# Patient Record
Sex: FEMALE | Race: WHITE | NOT HISPANIC OR LATINO | Employment: FULL TIME | URBAN - NONMETROPOLITAN AREA
[De-identification: names, ages, dates, MRNs, and addresses within clinical notes are randomized per-mention and may not be internally consistent; named-entity substitution may affect disease eponyms.]

---

## 2022-03-15 ENCOUNTER — HOSPITAL ENCOUNTER (EMERGENCY)
Facility: HOSPITAL | Age: 42
Discharge: HOME | End: 2022-03-15
Admitting: EMERGENCY MEDICINE

## 2022-03-15 ENCOUNTER — APPOINTMENT (OUTPATIENT)
Dept: CT IMAGING | Facility: HOSPITAL | Age: 42
End: 2022-03-15

## 2022-03-15 VITALS
HEART RATE: 62 BPM | HEIGHT: 70 IN | SYSTOLIC BLOOD PRESSURE: 142 MMHG | WEIGHT: 184 LBS | RESPIRATION RATE: 16 BRPM | BODY MASS INDEX: 26.34 KG/M2 | OXYGEN SATURATION: 98 % | TEMPERATURE: 98.4 F | DIASTOLIC BLOOD PRESSURE: 71 MMHG

## 2022-03-15 DIAGNOSIS — N39.0 URINARY TRACT INFECTION WITHOUT HEMATURIA, SITE UNSPECIFIED: ICD-10-CM

## 2022-03-15 DIAGNOSIS — N20.0 RENAL CALCULUS, RIGHT: Primary | ICD-10-CM

## 2022-03-15 DIAGNOSIS — K42.9 UMBILICAL HERNIA WITHOUT OBSTRUCTION AND WITHOUT GANGRENE: ICD-10-CM

## 2022-03-15 LAB
ALBUMIN SERPL-MCNC: 4.5 G/DL (ref 3.5–5.2)
ALBUMIN/GLOB SERPL: 1.9 G/DL
ALP SERPL-CCNC: 50 U/L (ref 39–117)
ALT SERPL W P-5'-P-CCNC: 8 U/L (ref 1–33)
ANION GAP SERPL CALCULATED.3IONS-SCNC: 8 MMOL/L (ref 5–15)
AST SERPL-CCNC: 11 U/L (ref 1–32)
BACTERIA UR QL AUTO: ABNORMAL /HPF
BASOPHILS # BLD AUTO: 0.04 10*3/MM3 (ref 0–0.2)
BASOPHILS NFR BLD AUTO: 0.4 % (ref 0–1.5)
BILIRUB SERPL-MCNC: 0.2 MG/DL (ref 0–1.2)
BILIRUB UR QL STRIP: NEGATIVE
BUN SERPL-MCNC: 16 MG/DL (ref 6–20)
BUN/CREAT SERPL: 25.4 (ref 7–25)
CALCIUM SPEC-SCNC: 9.8 MG/DL (ref 8.6–10.5)
CHLORIDE SERPL-SCNC: 105 MMOL/L (ref 98–107)
CLARITY UR: ABNORMAL
CO2 SERPL-SCNC: 27 MMOL/L (ref 22–29)
COLOR UR: YELLOW
CREAT SERPL-MCNC: 0.63 MG/DL (ref 0.57–1)
D-LACTATE SERPL-SCNC: 0.7 MMOL/L (ref 0.5–2)
DEPRECATED RDW RBC AUTO: 45 FL (ref 37–54)
EGFRCR SERPLBLD CKD-EPI 2021: 115.9 ML/MIN/1.73
EOSINOPHIL # BLD AUTO: 0.12 10*3/MM3 (ref 0–0.4)
EOSINOPHIL NFR BLD AUTO: 1.2 % (ref 0.3–6.2)
ERYTHROCYTE [DISTWIDTH] IN BLOOD BY AUTOMATED COUNT: 13.3 % (ref 12.3–15.4)
GLOBULIN UR ELPH-MCNC: 2.4 GM/DL
GLUCOSE SERPL-MCNC: 97 MG/DL (ref 65–99)
GLUCOSE UR STRIP-MCNC: NEGATIVE MG/DL
HCT VFR BLD AUTO: 39.8 % (ref 34–46.6)
HGB BLD-MCNC: 13.1 G/DL (ref 12–15.9)
HGB UR QL STRIP.AUTO: ABNORMAL
HYALINE CASTS UR QL AUTO: ABNORMAL /LPF
IMM GRANULOCYTES # BLD AUTO: 0.04 10*3/MM3 (ref 0–0.05)
IMM GRANULOCYTES NFR BLD AUTO: 0.4 % (ref 0–0.5)
KETONES UR QL STRIP: NEGATIVE
LEUKOCYTE ESTERASE UR QL STRIP.AUTO: ABNORMAL
LIPASE SERPL-CCNC: 37 U/L (ref 13–60)
LYMPHOCYTES # BLD AUTO: 2.45 10*3/MM3 (ref 0.7–3.1)
LYMPHOCYTES NFR BLD AUTO: 24 % (ref 19.6–45.3)
MCH RBC QN AUTO: 30.2 PG (ref 26.6–33)
MCHC RBC AUTO-ENTMCNC: 32.9 G/DL (ref 31.5–35.7)
MCV RBC AUTO: 91.7 FL (ref 79–97)
MONOCYTES # BLD AUTO: 0.55 10*3/MM3 (ref 0.1–0.9)
MONOCYTES NFR BLD AUTO: 5.4 % (ref 5–12)
NEUTROPHILS NFR BLD AUTO: 68.6 % (ref 42.7–76)
NEUTROPHILS NFR BLD AUTO: 7 10*3/MM3 (ref 1.7–7)
NITRITE UR QL STRIP: POSITIVE
NRBC BLD AUTO-RTO: 0 /100 WBC (ref 0–0.2)
PH UR STRIP.AUTO: 6 [PH] (ref 5–8)
PLATELET # BLD AUTO: 251 10*3/MM3 (ref 140–450)
PMV BLD AUTO: 9.8 FL (ref 6–12)
POTASSIUM SERPL-SCNC: 3.8 MMOL/L (ref 3.5–5.2)
PROCALCITONIN SERPL-MCNC: 0.03 NG/ML (ref 0–0.25)
PROT SERPL-MCNC: 6.9 G/DL (ref 6–8.5)
PROT UR QL STRIP: ABNORMAL
RBC # BLD AUTO: 4.34 10*6/MM3 (ref 3.77–5.28)
RBC # UR STRIP: ABNORMAL /HPF
REF LAB TEST METHOD: ABNORMAL
SODIUM SERPL-SCNC: 140 MMOL/L (ref 136–145)
SP GR UR STRIP: 1.01 (ref 1–1.03)
SQUAMOUS #/AREA URNS HPF: ABNORMAL /HPF
UROBILINOGEN UR QL STRIP: ABNORMAL
WBC # UR STRIP: ABNORMAL /HPF
WBC NRBC COR # BLD: 10.2 10*3/MM3 (ref 3.4–10.8)
YEAST URNS QL MICRO: ABNORMAL /HPF

## 2022-03-15 PROCEDURE — 74177 CT ABD & PELVIS W/CONTRAST: CPT

## 2022-03-15 PROCEDURE — 25010000002 CEFTRIAXONE PER 250 MG: Performed by: PHYSICIAN ASSISTANT

## 2022-03-15 PROCEDURE — 25010000002 IOPAMIDOL 61 % SOLUTION: Performed by: PHYSICIAN ASSISTANT

## 2022-03-15 PROCEDURE — 25010000002 ONDANSETRON PER 1 MG: Performed by: PHYSICIAN ASSISTANT

## 2022-03-15 PROCEDURE — 25010000002 MORPHINE PER 10 MG: Performed by: FAMILY MEDICINE

## 2022-03-15 PROCEDURE — 83605 ASSAY OF LACTIC ACID: CPT | Performed by: PHYSICIAN ASSISTANT

## 2022-03-15 PROCEDURE — 87077 CULTURE AEROBIC IDENTIFY: CPT | Performed by: PHYSICIAN ASSISTANT

## 2022-03-15 PROCEDURE — 87186 SC STD MICRODIL/AGAR DIL: CPT | Performed by: PHYSICIAN ASSISTANT

## 2022-03-15 PROCEDURE — 80053 COMPREHEN METABOLIC PANEL: CPT | Performed by: PHYSICIAN ASSISTANT

## 2022-03-15 PROCEDURE — 81001 URINALYSIS AUTO W/SCOPE: CPT | Performed by: PHYSICIAN ASSISTANT

## 2022-03-15 PROCEDURE — 84145 PROCALCITONIN (PCT): CPT | Performed by: PHYSICIAN ASSISTANT

## 2022-03-15 PROCEDURE — 99284 EMERGENCY DEPT VISIT MOD MDM: CPT

## 2022-03-15 PROCEDURE — 85025 COMPLETE CBC W/AUTO DIFF WBC: CPT | Performed by: PHYSICIAN ASSISTANT

## 2022-03-15 PROCEDURE — 83690 ASSAY OF LIPASE: CPT | Performed by: PHYSICIAN ASSISTANT

## 2022-03-15 PROCEDURE — 99283 EMERGENCY DEPT VISIT LOW MDM: CPT

## 2022-03-15 PROCEDURE — 96374 THER/PROPH/DIAG INJ IV PUSH: CPT

## 2022-03-15 PROCEDURE — 96375 TX/PRO/DX INJ NEW DRUG ADDON: CPT

## 2022-03-15 PROCEDURE — 87086 URINE CULTURE/COLONY COUNT: CPT | Performed by: PHYSICIAN ASSISTANT

## 2022-03-15 RX ORDER — HYDROCODONE BITARTRATE AND ACETAMINOPHEN 7.5; 325 MG/1; MG/1
1 TABLET ORAL ONCE
Status: COMPLETED | OUTPATIENT
Start: 2022-03-15 | End: 2022-03-15

## 2022-03-15 RX ORDER — KETOROLAC TROMETHAMINE 10 MG/1
10 TABLET, FILM COATED ORAL EVERY 6 HOURS PRN
Qty: 12 TABLET | Refills: 0 | Status: SHIPPED | OUTPATIENT
Start: 2022-03-15 | End: 2022-06-24

## 2022-03-15 RX ORDER — ONDANSETRON 4 MG/1
4 TABLET, ORALLY DISINTEGRATING ORAL EVERY 6 HOURS PRN
Qty: 12 TABLET | Refills: 0 | Status: SHIPPED | OUTPATIENT
Start: 2022-03-15 | End: 2022-03-17 | Stop reason: SDUPTHER

## 2022-03-15 RX ORDER — CEFDINIR 300 MG/1
300 CAPSULE ORAL 2 TIMES DAILY
Qty: 14 CAPSULE | Refills: 0 | Status: SHIPPED | OUTPATIENT
Start: 2022-03-15 | End: 2022-03-22

## 2022-03-15 RX ORDER — OXYCODONE AND ACETAMINOPHEN 7.5; 325 MG/1; MG/1
1 TABLET ORAL EVERY 6 HOURS PRN
Qty: 6 TABLET | Refills: 0 | Status: SHIPPED | OUTPATIENT
Start: 2022-03-15 | End: 2022-03-17 | Stop reason: SDUPTHER

## 2022-03-15 RX ORDER — ONDANSETRON 2 MG/ML
4 INJECTION INTRAMUSCULAR; INTRAVENOUS ONCE
Status: COMPLETED | OUTPATIENT
Start: 2022-03-15 | End: 2022-03-15

## 2022-03-15 RX ORDER — SODIUM CHLORIDE 0.9 % (FLUSH) 0.9 %
10 SYRINGE (ML) INJECTION AS NEEDED
Status: DISCONTINUED | OUTPATIENT
Start: 2022-03-15 | End: 2022-03-15 | Stop reason: HOSPADM

## 2022-03-15 RX ADMIN — SODIUM CHLORIDE, POTASSIUM CHLORIDE, SODIUM LACTATE AND CALCIUM CHLORIDE 1000 ML: 600; 310; 30; 20 INJECTION, SOLUTION INTRAVENOUS at 17:57

## 2022-03-15 RX ADMIN — WATER 1 G: 1000 INJECTION, SOLUTION INTRAVENOUS at 20:13

## 2022-03-15 RX ADMIN — IOPAMIDOL 100 ML: 612 INJECTION, SOLUTION INTRAVENOUS at 19:21

## 2022-03-15 RX ADMIN — ONDANSETRON HYDROCHLORIDE 4 MG: 2 SOLUTION INTRAMUSCULAR; INTRAVENOUS at 17:57

## 2022-03-15 RX ADMIN — MORPHINE SULFATE 4 MG: 4 INJECTION, SOLUTION INTRAMUSCULAR; INTRAVENOUS at 17:57

## 2022-03-15 RX ADMIN — HYDROCODONE BITARTRATE AND ACETAMINOPHEN 1 TABLET: 7.5; 325 TABLET ORAL at 21:06

## 2022-03-15 NOTE — ED PROVIDER NOTES
"Subjective   History of Present Illness    Patient is a 41-year-old female presenting to ED with abdominal pain.  PMH significant for Graves' disease, status post tubal ligation, status post appendectomy.  Patient states 3 days ago she began developing what she thought was intense menstrual cramps in her right lower quadrant as well as pain in her right flank.  Patient states she developed some mild nausea with these episodes and had a longer than normal menstrual period.  Patient reports that when her symptoms persisted she presented to her primary care provider yesterday where she was advised she had a urinary tract infection.  Patient states she was contacted yesterday in the evening with concerns for \"kidney problems or kidney stone\" at which time they advise she come to the ER.  Patient reports she tried to deal with her pain overnight and presents at this time for further evaluation.  Patient did note that she had a negative pelvic ultrasound done yesterday reporting \"there are no female issues.\"  Patient denies fevers, chills, diaphoresis, any further vaginal bleeding, hematuria, vaginal discharge, any concern for known exposure to STDs.  Patient denies any other known sick contact.  Patient reports that a prescription was called in for Macrobid however she is only had 1 dose.    Records reviewed show patient checked into urgent care and was advised to come to the ER without being seen.    Review of Systems   Constitutional: Negative.  Negative for chills, diaphoresis and fever.   HENT: Negative.    Eyes: Negative.    Respiratory: Negative.    Cardiovascular: Negative.    Gastrointestinal: Positive for abdominal pain (RLQ) and nausea. Negative for constipation, diarrhea and vomiting.   Genitourinary: Positive for flank pain (right). Negative for dysuria and hematuria.   Skin: Negative.    Neurological: Negative.    Psychiatric/Behavioral: Negative.    All other systems reviewed and are negative.      Past " Medical History:   Diagnosis Date   • Graves disease    • SVT (supraventricular tachycardia) (HCC)        No Known Allergies    Past Surgical History:   Procedure Laterality Date   • APPENDECTOMY     • TUBAL ABDOMINAL LIGATION         History reviewed. No pertinent family history.    Social History     Socioeconomic History   • Marital status:    Tobacco Use   • Smoking status: Current Every Day Smoker     Packs/day: 1.00     Types: Cigarettes   • Smokeless tobacco: Never Used   Substance and Sexual Activity   • Alcohol use: Never   • Drug use: Never           Objective   Physical Exam  Vitals and nursing note reviewed.   Constitutional:       General: She is in acute distress (appears uncomfortable due to pain).      Appearance: Normal appearance. She is well-developed, well-groomed and overweight. She is not toxic-appearing or diaphoretic.   HENT:      Head: Normocephalic.      Mouth/Throat:      Mouth: Mucous membranes are moist.      Pharynx: Oropharynx is clear.   Eyes:      Conjunctiva/sclera: Conjunctivae normal.      Pupils: Pupils are equal, round, and reactive to light.   Cardiovascular:      Rate and Rhythm: Normal rate and regular rhythm.   Pulmonary:      Effort: Pulmonary effort is normal.      Breath sounds: Normal breath sounds.   Abdominal:      General: Bowel sounds are normal.      Palpations: Abdomen is soft.      Tenderness: There is abdominal tenderness in the right lower quadrant. There is no right CVA tenderness or left CVA tenderness.   Musculoskeletal:         General: Normal range of motion.      Cervical back: Normal range of motion and neck supple.   Skin:     General: Skin is warm and dry.      Coloration: Skin is not jaundiced or pale.   Neurological:      Mental Status: She is alert and oriented to person, place, and time.      Gait: Gait normal.   Psychiatric:         Attention and Perception: Attention normal.         Mood and Affect: Mood and affect normal.         Speech:  Speech normal.         Behavior: Behavior normal. Behavior is cooperative.         Procedures           ED Course  ED Course as of 03/16/22 0910   Tue Mar 15, 2022   2023 [facility] report #[id number].  No previous prescriptions filled in the past year.   No suspicious activity.  [JS]      ED Course User Index  [JS] Jhony Plaza PA-C                                                 MDM  Number of Diagnoses or Management Options     Amount and/or Complexity of Data Reviewed  Clinical lab tests: reviewed and ordered  Tests in the radiology section of CPT®: reviewed and ordered  Tests in the medicine section of CPT®: ordered and reviewed  Decide to obtain previous medical records or to obtain history from someone other than the patient: yes  Review and summarize past medical records: yes  Discuss the patient with other providers: yes (Dr. Joaquim Blair (attending))      Patient is a 41-year-old female presenting to ED with abdominal pain.  PMH significant for Graves' disease, status post tubal ligation, status post appendectomy.  Lab work unremarkable including normal blood cell count 10.2 and no further findings on CBC.  CMP unremarkable including no electrolyte disturbances or renal dysfunctions.  Lipase WNL.  Lactic acid and procalcitonin WNL.  Urinalysis with evidence of infection with large leukocytes, nitrite positive, TNTC WBC, 2+ bacteria, 0-2 squamous epithelium, 0-2 RBC. CT the abdomen and pelvis showed: 13 mm calculus within right renal pelvis with associated urethral thickening within the right renal pelvis and proximal right ureter, no significant distention upper tracts of right kidney, punctate nonobstructing calculus noted involving the lower pole calyx of left kidney, no additional ureteral stones.  Normal bowel gas pattern.  Fat-containing periumbilical hernia.  Uterus and adnexa unremarkable. Case discussed with Dr. Chavez, urology, request patient go to the clinic at 8 am this Thursday 3/17/22.   Request that patient be started on cefdinir at home empirically until her culture results.  Requests adequate pain control for at home with no further recommendations at this time.  Case was discussed with Dr. Joaquim Blair who is in agreement and will provide a short course of pain medications.  Discussed with patient need for continued outpatient follow-up with urology at 8 AM on Thursday.  Discussed importance of staying well-hydrated, compliance with antibiotics, appropriate use of Toradol versus pain medications and Zofran for nausea control.  Discussed return precautions need for me to return to ED should she develop any new or worsening symptoms.  Patient very patient with no further questions concerns, needs at this time and is stable for discharge.      Final diagnoses:   Umbilical hernia without obstruction and without gangrene   Urinary tract infection without hematuria, site unspecified   Renal calculus, right       ED Disposition  ED Disposition     ED Disposition   Discharge    Condition   Stable    Comment   --             Michele Chavez MD  [address] [id number]  [phone]      Present to the office at 8 AM this upcoming Thursday, 3/17/2022     Emergency Department  [address] [id number] [phone]    As needed    Curtis Rowland MD  [address]  [address]  [facility] [address] [phone]    Schedule an appointment as soon as possible for a visit in 2 days           Medication List      New Prescriptions    cefdinir 300 MG capsule  Commonly known as: OMNICEF  Take 1 capsule by mouth 2 (Two) Times a Day for 7 days.     ketorolac 10 MG tablet  Commonly known as: TORADOL  Take 1 tablet by mouth Every 6 (Six) Hours As Needed for Mild Pain .     ondansetron ODT 4 MG disintegrating tablet  Commonly known as: ZOFRAN-ODT  Place 1 tablet on the tongue Every 6 (Six) Hours As Needed for Nausea.      oxyCODONE-acetaminophen 7.5-325 MG per tablet  Commonly known as: PERCOCET  Take 1 tablet by mouth Every 6 (Six) Hours As Needed for Moderate Pain .           Where to Get Your Medications      These medications were sent to [facility] - [place], [place] - [address] - [phone]  - [phone] FX  [address] [address]    Phone: [phone]   · cefdinir 300 MG capsule  · ketorolac 10 MG tablet  · ondansetron ODT 4 MG disintegrating tablet  · oxyCODONE-acetaminophen 7.5-325 MG per tablet          Jhony Plaza PA-C  03/16/22 0900

## 2022-03-16 NOTE — DISCHARGE INSTRUCTIONS
Please follow-up at the urology office at 8 AM this upcoming Thursday 3/17.  Please make sure that you are staying well-hydrated by increasing water.  Please use the Zofran for nausea control.  Exam please use the Toradol as a first-line for pain control and should you continue to have pain use the pain medication.  Please complete all of your antibiotics in their entirety by using the cefdinir.

## 2022-03-17 ENCOUNTER — OFFICE VISIT (OUTPATIENT)
Dept: UROLOGY | Facility: CLINIC | Age: 42
End: 2022-03-17

## 2022-03-17 VITALS
WEIGHT: 184 LBS | HEIGHT: 70 IN | HEART RATE: 60 BPM | TEMPERATURE: 97.7 F | DIASTOLIC BLOOD PRESSURE: 80 MMHG | SYSTOLIC BLOOD PRESSURE: 115 MMHG | BODY MASS INDEX: 26.34 KG/M2

## 2022-03-17 DIAGNOSIS — N20.0 RENAL CALCULUS, RIGHT: ICD-10-CM

## 2022-03-17 DIAGNOSIS — R10.9 RIGHT FLANK PAIN: ICD-10-CM

## 2022-03-17 DIAGNOSIS — N20.1 RIGHT URETERAL STONE: Primary | ICD-10-CM

## 2022-03-17 LAB
BACTERIA SPEC AEROBE CULT: ABNORMAL
BILIRUB BLD-MCNC: ABNORMAL MG/DL
CLARITY, POC: ABNORMAL
COLOR UR: ABNORMAL
GLUCOSE UR STRIP-MCNC: NEGATIVE MG/DL
KETONES UR QL: NEGATIVE
LEUKOCYTE EST, POC: ABNORMAL
NITRITE UR-MCNC: NEGATIVE MG/ML
PH UR: 5 [PH] (ref 5–8)
PROT UR STRIP-MCNC: ABNORMAL MG/DL
RBC # UR STRIP: ABNORMAL /UL
SP GR UR: 1.03 (ref 1–1.03)
UROBILINOGEN UR QL: NORMAL

## 2022-03-17 PROCEDURE — 99203 OFFICE O/P NEW LOW 30 MIN: CPT | Performed by: PHYSICIAN ASSISTANT

## 2022-03-17 RX ORDER — ONDANSETRON 4 MG/1
4 TABLET, ORALLY DISINTEGRATING ORAL EVERY 6 HOURS PRN
Qty: 12 TABLET | Refills: 0 | Status: SHIPPED | OUTPATIENT
Start: 2022-03-17 | End: 2022-06-24

## 2022-03-17 RX ORDER — OXYCODONE AND ACETAMINOPHEN 7.5; 325 MG/1; MG/1
1 TABLET ORAL EVERY 6 HOURS PRN
Qty: 18 TABLET | Refills: 0 | Status: SHIPPED | OUTPATIENT
Start: 2022-03-17 | End: 2022-03-28

## 2022-03-17 NOTE — H&P (VIEW-ONLY)
Subjective    Ms. Arce is 41 y.o. female    Chief Complaint: Here for Kidney Stones. Imaging completed [facility]     History of Present Illness  Urolithiasis  Patient complains of right flank pain with radiation to the abdomen. Onset of symptoms was abrupt starting 2 days ago with unchanged course since that time. Patient describes the pain as colicky and cramping, continuous and rated as moderate. The patient has had nausea and no nausea. There has been no fever or chills. The patient is not complaining of dysuria, frequency, or urgency.  Previous management of stones includes none.  The CT scan done emergency department yesterday revealed a proximal 11 to 12 mm stone in the right renal pelvis with some associated obstructive changes.  I tiny stone in the left kidney no other obvious stones are seen.  Labs were normal in the ER except for a positive urine culture she is taking as cefdinir.    The following portions of the patient's history were reviewed and updated as appropriate: allergies, current medications, past family history, past medical history, past social history, past surgical history and problem list.    Review of Systems   Constitutional: Negative for chills and fever.   Gastrointestinal: Negative for abdominal pain, anal bleeding and blood in stool.   Genitourinary: Positive for flank pain. Negative for decreased urine volume, difficulty urinating, dyspareunia, dysuria, enuresis, frequency, genital sores, hematuria, menstrual problem, pelvic pain, urgency, vaginal bleeding, vaginal discharge and vaginal pain.         Current Outpatient Medications:   •  cefdinir (OMNICEF) 300 MG capsule, Take 1 capsule by mouth 2 (Two) Times a Day for 7 days., Disp: 14 capsule, Rfl: 0  •  ketorolac (TORADOL) 10 MG tablet, Take 1 tablet by mouth Every 6 (Six) Hours As Needed for Mild Pain ., Disp: 12 tablet, Rfl: 0  •  ondansetron ODT (ZOFRAN-ODT) 4 MG disintegrating tablet, Place 1 tablet on the tongue Every 6  "(Six) Hours As Needed for Nausea., Disp: 12 tablet, Rfl: 0  •  oxyCODONE-acetaminophen (PERCOCET) 7.5-325 MG per tablet, Take 1 tablet by mouth Every 6 (Six) Hours As Needed for Moderate Pain ., Disp: 6 tablet, Rfl: 0    Past Medical History:   Diagnosis Date   • Graves disease    • SVT (supraventricular tachycardia) (HCC)        Past Surgical History:   Procedure Laterality Date   • APPENDECTOMY     • TUBAL ABDOMINAL LIGATION         Social History     Socioeconomic History   • Marital status:    Tobacco Use   • Smoking status: Current Every Day Smoker     Packs/day: 1.00     Types: Cigarettes   • Smokeless tobacco: Never Used   Substance and Sexual Activity   • Alcohol use: Never   • Drug use: Never       No family history on file.    Objective    /80   Pulse 60   Temp 97.7 °F (36.5 °C)   Ht 177.8 cm (70\")   Wt 83.5 kg (184 lb)   LMP  (LMP Unknown)   BMI 26.40 kg/m²     Physical Exam  Vitals reviewed.   Constitutional:       Appearance: Normal appearance.   HENT:      Head: Normocephalic and atraumatic.      Nose: No congestion.   Pulmonary:      Effort: Pulmonary effort is normal.   Abdominal:      General: Abdomen is flat.      Palpations: Abdomen is soft.      Tenderness: There is abdominal tenderness. There is no right CVA tenderness or left CVA tenderness.   Skin:     General: Skin is warm and dry.   Neurological:      General: No focal deficit present.      Mental Status: She is alert and oriented to person, place, and time.   Psychiatric:         Mood and Affect: Mood normal.         Behavior: Behavior normal.         Independent review of a CT scan of abdomen and pelvis without contrast  The CT scan of the abdomen/pelvis done without contrast is available for me to review.  Treatment recommendations require an independent review.  First I scanned the liver, spleen, and bowel pattern.  The retroperitoneum including the major vessels and lymphatic packages are briefly reviewed.  This film " as been reviewed by the radiologist to determine any non urologic abnormalities that are present.  The kidneys are closely inspected for size, symmetry, contour, parenchymal thickness, perinephric reaction, presence of calcifications, and intrarenal dilation of the collecting system.  The ureters are inspected for their course, caliber, and any calcifications.  The bladder is inspected for its thickness, size, and presence of any calcifications.  This scan shows approximate 11 to 12 mm oval-shaped stone in the right renal pelvis.  No other obvious ureteral stones are seen there is a punctate stone in the left kidney.          Assessment and Plan    Diagnoses and all orders for this visit:    1. Right ureteral stone (Primary)  -     POC Urinalysis Dipstick, Multipro  -     Case Request; Standing  -     COVID PRE-OP / PRE-PROCEDURE SCREENING ORDER (NO ISOLATION) - Swab, Nasopharynx; Future  -     Protime-INR; Future  -     Case Request    2. Right flank pain  -     Case Request; Standing  -     COVID PRE-OP / PRE-PROCEDURE SCREENING ORDER (NO ISOLATION) - Swab, Nasopharynx; Future  -     Protime-INR; Future  -     Case Request    Other orders  -     Follow Anesthesia Guidelines / Standing Orders; Future  -     Provide NPO Instructions to Patient; Future    Patient went to the emergency department 03/15/2022 with gradually worsening severe pain in her right back right lower quadrant.  He was also having some nausea.  Has not had any stone episodes previously was not aware of the stone that is passing.  Labs were stable in the emergency department her vitals are stable today she still having fairly significant pain.  CT scan done showed a approximate 11 to 12 mm stone in the right renal pelvis.  No other obvious ureteral stones are seen.  I discussed the case with Dr. Chavez and patient will be scheduled next Wednesday for  ESWL with cystoscopy stent placement due to the large size of the stone and the risk of developing  obstruction from stone fragments.  I did tell her that having blood in the urine both with ESWL and cystoscopy stent is likely or possible also pain from the stent is possible.  After discussion patient wishes to proceed with procedure as above which will be next Wednesday with Dr. Chavez.  Her preliminary urine culture is positive for bacteria she has been started on cefdinir.

## 2022-03-17 NOTE — PROGRESS NOTES
Subjective    Ms. Arce is 41 y.o. female    Chief Complaint: Here for Kidney Stones. Imaging completed [facility]     History of Present Illness  Urolithiasis  Patient complains of right flank pain with radiation to the abdomen. Onset of symptoms was abrupt starting 2 days ago with unchanged course since that time. Patient describes the pain as colicky and cramping, continuous and rated as moderate. The patient has had nausea and no nausea. There has been no fever or chills. The patient is not complaining of dysuria, frequency, or urgency.  Previous management of stones includes none.  The CT scan done emergency department yesterday revealed a proximal 11 to 12 mm stone in the right renal pelvis with some associated obstructive changes.  I tiny stone in the left kidney no other obvious stones are seen.  Labs were normal in the ER except for a positive urine culture she is taking as cefdinir.    The following portions of the patient's history were reviewed and updated as appropriate: allergies, current medications, past family history, past medical history, past social history, past surgical history and problem list.    Review of Systems   Constitutional: Negative for chills and fever.   Gastrointestinal: Negative for abdominal pain, anal bleeding and blood in stool.   Genitourinary: Positive for flank pain. Negative for decreased urine volume, difficulty urinating, dyspareunia, dysuria, enuresis, frequency, genital sores, hematuria, menstrual problem, pelvic pain, urgency, vaginal bleeding, vaginal discharge and vaginal pain.         Current Outpatient Medications:   •  cefdinir (OMNICEF) 300 MG capsule, Take 1 capsule by mouth 2 (Two) Times a Day for 7 days., Disp: 14 capsule, Rfl: 0  •  ketorolac (TORADOL) 10 MG tablet, Take 1 tablet by mouth Every 6 (Six) Hours As Needed for Mild Pain ., Disp: 12 tablet, Rfl: 0  •  ondansetron ODT (ZOFRAN-ODT) 4 MG disintegrating tablet, Place 1 tablet on the tongue Every 6  "(Six) Hours As Needed for Nausea., Disp: 12 tablet, Rfl: 0  •  oxyCODONE-acetaminophen (PERCOCET) 7.5-325 MG per tablet, Take 1 tablet by mouth Every 6 (Six) Hours As Needed for Moderate Pain ., Disp: 6 tablet, Rfl: 0    Past Medical History:   Diagnosis Date   • Graves disease    • SVT (supraventricular tachycardia) (HCC)        Past Surgical History:   Procedure Laterality Date   • APPENDECTOMY     • TUBAL ABDOMINAL LIGATION         Social History     Socioeconomic History   • Marital status:    Tobacco Use   • Smoking status: Current Every Day Smoker     Packs/day: 1.00     Types: Cigarettes   • Smokeless tobacco: Never Used   Substance and Sexual Activity   • Alcohol use: Never   • Drug use: Never       No family history on file.    Objective    /80   Pulse 60   Temp 97.7 °F (36.5 °C)   Ht 177.8 cm (70\")   Wt 83.5 kg (184 lb)   LMP  (LMP Unknown)   BMI 26.40 kg/m²     Physical Exam  Vitals reviewed.   Constitutional:       Appearance: Normal appearance.   HENT:      Head: Normocephalic and atraumatic.      Nose: No congestion.   Pulmonary:      Effort: Pulmonary effort is normal.   Abdominal:      General: Abdomen is flat.      Palpations: Abdomen is soft.      Tenderness: There is abdominal tenderness. There is no right CVA tenderness or left CVA tenderness.   Skin:     General: Skin is warm and dry.   Neurological:      General: No focal deficit present.      Mental Status: She is alert and oriented to person, place, and time.   Psychiatric:         Mood and Affect: Mood normal.         Behavior: Behavior normal.         Independent review of a CT scan of abdomen and pelvis without contrast  The CT scan of the abdomen/pelvis done without contrast is available for me to review.  Treatment recommendations require an independent review.  First I scanned the liver, spleen, and bowel pattern.  The retroperitoneum including the major vessels and lymphatic packages are briefly reviewed.  This film " as been reviewed by the radiologist to determine any non urologic abnormalities that are present.  The kidneys are closely inspected for size, symmetry, contour, parenchymal thickness, perinephric reaction, presence of calcifications, and intrarenal dilation of the collecting system.  The ureters are inspected for their course, caliber, and any calcifications.  The bladder is inspected for its thickness, size, and presence of any calcifications.  This scan shows approximate 11 to 12 mm oval-shaped stone in the right renal pelvis.  No other obvious ureteral stones are seen there is a punctate stone in the left kidney.          Assessment and Plan    Diagnoses and all orders for this visit:    1. Right ureteral stone (Primary)  -     POC Urinalysis Dipstick, Multipro  -     Case Request; Standing  -     COVID PRE-OP / PRE-PROCEDURE SCREENING ORDER (NO ISOLATION) - Swab, Nasopharynx; Future  -     Protime-INR; Future  -     Case Request    2. Right flank pain  -     Case Request; Standing  -     COVID PRE-OP / PRE-PROCEDURE SCREENING ORDER (NO ISOLATION) - Swab, Nasopharynx; Future  -     Protime-INR; Future  -     Case Request    Other orders  -     Follow Anesthesia Guidelines / Standing Orders; Future  -     Provide NPO Instructions to Patient; Future    Patient went to the emergency department 03/15/2022 with gradually worsening severe pain in her right back right lower quadrant.  He was also having some nausea.  Has not had any stone episodes previously was not aware of the stone that is passing.  Labs were stable in the emergency department her vitals are stable today she still having fairly significant pain.  CT scan done showed a approximate 11 to 12 mm stone in the right renal pelvis.  No other obvious ureteral stones are seen.  I discussed the case with Dr. Chavez and patient will be scheduled next Wednesday for  ESWL with cystoscopy stent placement due to the large size of the stone and the risk of developing  obstruction from stone fragments.  I did tell her that having blood in the urine both with ESWL and cystoscopy stent is likely or possible also pain from the stent is possible.  After discussion patient wishes to proceed with procedure as above which will be next Wednesday with Dr. Chavez.  Her preliminary urine culture is positive for bacteria she has been started on cefdinir.

## 2022-03-22 ENCOUNTER — PRE-ADMISSION TESTING (OUTPATIENT)
Dept: PREADMISSION TESTING | Facility: HOSPITAL | Age: 42
End: 2022-03-22

## 2022-03-22 ENCOUNTER — LAB (OUTPATIENT)
Dept: LAB | Facility: HOSPITAL | Age: 42
End: 2022-03-22

## 2022-03-22 VITALS
OXYGEN SATURATION: 100 % | SYSTOLIC BLOOD PRESSURE: 130 MMHG | DIASTOLIC BLOOD PRESSURE: 73 MMHG | RESPIRATION RATE: 18 BRPM | HEART RATE: 64 BPM | BODY MASS INDEX: 27.4 KG/M2 | HEIGHT: 69 IN | WEIGHT: 184.97 LBS

## 2022-03-22 DIAGNOSIS — N20.1 RIGHT URETERAL STONE: ICD-10-CM

## 2022-03-22 DIAGNOSIS — R10.9 RIGHT FLANK PAIN: ICD-10-CM

## 2022-03-22 LAB — SARS-COV-2 RNA PNL SPEC NAA+PROBE: NOT DETECTED

## 2022-03-22 PROCEDURE — 87635 SARS-COV-2 COVID-19 AMP PRB: CPT

## 2022-03-22 PROCEDURE — 85610 PROTHROMBIN TIME: CPT | Performed by: PHYSICIAN ASSISTANT

## 2022-03-22 PROCEDURE — 93005 ELECTROCARDIOGRAM TRACING: CPT

## 2022-03-22 PROCEDURE — C9803 HOPD COVID-19 SPEC COLLECT: HCPCS

## 2022-03-22 PROCEDURE — 93010 ELECTROCARDIOGRAM REPORT: CPT | Performed by: INTERNAL MEDICINE

## 2022-03-22 RX ORDER — ATENOLOL 25 MG/1
12.5 TABLET ORAL DAILY
COMMUNITY
End: 2022-06-24

## 2022-03-22 NOTE — DISCHARGE INSTRUCTIONS
Before you come to the hospital      Do not eat, drink, smoke, or chew gum after midnight the night before surgery. This includes no mints.    Arrival time: AS DIRECTED BY OFFICE     Only one family member or friend will be allowed per patient      YOU MAY TAKE THE FOLLOWING MEDICATION(S) THE MORNING OF SURGERY WITH A SIP OF WATER: ***toradol, percocet           ALL OTHER HOME MEDICATION CHECK WITH YOUR PHYSICIAN                            (especially if you are taking diabetes medicines or blood thinners)     Do not take any Erectile Dysfunction medications (EX: CIALIS, VIAGRA) 24 hours prior to surgery      If you were given and instructed to use a germ- killing soap, use as directed the night before surgery and the morning of surgery before coming to the hospital.                 MANAGING PAIN AFTER SURGERY    We know you are probably wondering what your pain will be like after surgery.  Following surgery it is unrealistic to expect you will not have pain.   Pain is how our bodies let us know that something is wrong or cautions us to be careful.  That said, our goal is to make your pain tolerable.    Methods we may use to treat your pain include (oral or IV medications, PCAs, epidurals, nerve blocks, etc.)   While some procedures require IV pain medications for a short time after surgery, transitioning to pain medications by mouth allows for better management of pain.   Your nurse will encourage you to take oral pain medications whenever possible.  IV medications work almost immediately, but only last a short while.  Taking medications by mouth allows for a more constant level of medication in your blood stream for a longer period of time.      Once your pain is out of control it is harder to get back under control.  It is important you are aware when your next dose of pain medication is due.  If you are admitted, your nurse may write the time of your next dose on the white board in your room to help you  remember.      We are interested in your pain and encourage you to inform us about aggravating factors during your visit.   Many times a simple repositioning every few hours can make a big difference.    If your physician says it is okay, do not let your pain prevent you from getting out of bed. Be sure to call your nurse for assistance prior to getting up so you do not fall.      Before surgery, please decide your tolerable pain goal.  These faces help describe the pain ratings we use on a 0-10 scale.   Be prepared to tell us your goal and whether or not you take pain or anxiety medications at home.

## 2022-03-23 ENCOUNTER — HOSPITAL ENCOUNTER (OUTPATIENT)
Facility: HOSPITAL | Age: 42
Setting detail: HOSPITAL OUTPATIENT SURGERY
Discharge: HOME | End: 2022-03-23
Attending: UROLOGY | Admitting: UROLOGY

## 2022-03-23 ENCOUNTER — APPOINTMENT (OUTPATIENT)
Dept: GENERAL RADIOLOGY | Facility: HOSPITAL | Age: 42
End: 2022-03-23

## 2022-03-23 ENCOUNTER — ANESTHESIA EVENT (OUTPATIENT)
Dept: PERIOP | Facility: HOSPITAL | Age: 42
End: 2022-03-23

## 2022-03-23 ENCOUNTER — ANESTHESIA (OUTPATIENT)
Dept: PERIOP | Facility: HOSPITAL | Age: 42
End: 2022-03-23

## 2022-03-23 VITALS
TEMPERATURE: 97 F | RESPIRATION RATE: 16 BRPM | DIASTOLIC BLOOD PRESSURE: 60 MMHG | OXYGEN SATURATION: 97 % | HEART RATE: 58 BPM | SYSTOLIC BLOOD PRESSURE: 128 MMHG

## 2022-03-23 DIAGNOSIS — R10.9 RIGHT FLANK PAIN: Primary | ICD-10-CM

## 2022-03-23 DIAGNOSIS — N20.0 RIGHT KIDNEY STONE: ICD-10-CM

## 2022-03-23 LAB
B-HCG UR QL: NEGATIVE
QT INTERVAL: 428 MS
QTC INTERVAL: 409 MS

## 2022-03-23 PROCEDURE — 74018 RADEX ABDOMEN 1 VIEW: CPT

## 2022-03-23 PROCEDURE — 25010000002 ONDANSETRON PER 1 MG: Performed by: NURSE ANESTHETIST, CERTIFIED REGISTERED

## 2022-03-23 PROCEDURE — C2617 STENT, NON-COR, TEM W/O DEL: HCPCS | Performed by: UROLOGY

## 2022-03-23 PROCEDURE — C1769 GUIDE WIRE: HCPCS | Performed by: UROLOGY

## 2022-03-23 PROCEDURE — 25010000002 FENTANYL CITRATE (PF) 100 MCG/2ML SOLUTION: Performed by: NURSE ANESTHETIST, CERTIFIED REGISTERED

## 2022-03-23 PROCEDURE — 81025 URINE PREGNANCY TEST: CPT | Performed by: UROLOGY

## 2022-03-23 PROCEDURE — 25010000002 FENTANYL CITRATE (PF) 50 MCG/ML SOLUTION: Performed by: ANESTHESIOLOGY

## 2022-03-23 PROCEDURE — 52332 CYSTOSCOPY AND TREATMENT: CPT | Performed by: UROLOGY

## 2022-03-23 PROCEDURE — 25010000002 IOPAMIDOL 61 % SOLUTION: Performed by: UROLOGY

## 2022-03-23 PROCEDURE — 50590 FRAGMENTING OF KIDNEY STONE: CPT | Performed by: UROLOGY

## 2022-03-23 PROCEDURE — 25010000002 CEFAZOLIN PER 500 MG: Performed by: NURSE ANESTHETIST, CERTIFIED REGISTERED

## 2022-03-23 PROCEDURE — 25010000002 PROPOFOL 10 MG/ML EMULSION: Performed by: NURSE ANESTHETIST, CERTIFIED REGISTERED

## 2022-03-23 PROCEDURE — 25010000002 DROPERIDOL PER 5 MG: Performed by: ANESTHESIOLOGY

## 2022-03-23 DEVICE — URETERAL STENT
Type: IMPLANTABLE DEVICE | Site: URETER | Status: FUNCTIONAL
Brand: PERCUFLEX™ PLUS

## 2022-03-23 RX ORDER — OXYBUTYNIN CHLORIDE 5 MG/1
5 TABLET ORAL EVERY 8 HOURS PRN
Qty: 30 TABLET | Refills: 1 | Status: SHIPPED | OUTPATIENT
Start: 2022-03-23 | End: 2022-06-24

## 2022-03-23 RX ORDER — DROPERIDOL 2.5 MG/ML
0.62 INJECTION, SOLUTION INTRAMUSCULAR; INTRAVENOUS ONCE AS NEEDED
Status: COMPLETED | OUTPATIENT
Start: 2022-03-23 | End: 2022-03-23

## 2022-03-23 RX ORDER — LABETALOL HYDROCHLORIDE 5 MG/ML
5 INJECTION, SOLUTION INTRAVENOUS
Status: DISCONTINUED | OUTPATIENT
Start: 2022-03-23 | End: 2022-03-23 | Stop reason: HOSPADM

## 2022-03-23 RX ORDER — FENTANYL CITRATE 50 UG/ML
INJECTION, SOLUTION INTRAMUSCULAR; INTRAVENOUS AS NEEDED
Status: DISCONTINUED | OUTPATIENT
Start: 2022-03-23 | End: 2022-03-23 | Stop reason: SURG

## 2022-03-23 RX ORDER — ACETAMINOPHEN 500 MG
1000 TABLET ORAL ONCE
Status: COMPLETED | OUTPATIENT
Start: 2022-03-23 | End: 2022-03-23

## 2022-03-23 RX ORDER — ONDANSETRON 4 MG/1
4 TABLET, ORALLY DISINTEGRATING ORAL EVERY 6 HOURS PRN
Qty: 6 TABLET | Refills: 1 | Status: SHIPPED | OUTPATIENT
Start: 2022-03-23 | End: 2022-06-24

## 2022-03-23 RX ORDER — SODIUM CHLORIDE, SODIUM LACTATE, POTASSIUM CHLORIDE, CALCIUM CHLORIDE 600; 310; 30; 20 MG/100ML; MG/100ML; MG/100ML; MG/100ML
100 INJECTION, SOLUTION INTRAVENOUS CONTINUOUS
Status: DISCONTINUED | OUTPATIENT
Start: 2022-03-23 | End: 2022-03-23 | Stop reason: HOSPADM

## 2022-03-23 RX ORDER — DOCUSATE SODIUM 100 MG/1
100 CAPSULE, LIQUID FILLED ORAL 2 TIMES DAILY
Qty: 60 CAPSULE | Refills: 1 | Status: SHIPPED | OUTPATIENT
Start: 2022-03-23 | End: 2022-06-24

## 2022-03-23 RX ORDER — SODIUM CHLORIDE, SODIUM LACTATE, POTASSIUM CHLORIDE, CALCIUM CHLORIDE 600; 310; 30; 20 MG/100ML; MG/100ML; MG/100ML; MG/100ML
1000 INJECTION, SOLUTION INTRAVENOUS CONTINUOUS
Status: DISCONTINUED | OUTPATIENT
Start: 2022-03-23 | End: 2022-03-23 | Stop reason: HOSPADM

## 2022-03-23 RX ORDER — FENTANYL CITRATE 50 UG/ML
25 INJECTION, SOLUTION INTRAMUSCULAR; INTRAVENOUS
Status: DISCONTINUED | OUTPATIENT
Start: 2022-03-23 | End: 2022-03-23 | Stop reason: HOSPADM

## 2022-03-23 RX ORDER — NALOXONE HCL 0.4 MG/ML
0.4 VIAL (ML) INJECTION AS NEEDED
Status: DISCONTINUED | OUTPATIENT
Start: 2022-03-23 | End: 2022-03-23 | Stop reason: HOSPADM

## 2022-03-23 RX ORDER — LIDOCAINE HYDROCHLORIDE 20 MG/ML
INJECTION, SOLUTION EPIDURAL; INFILTRATION; INTRACAUDAL; PERINEURAL AS NEEDED
Status: DISCONTINUED | OUTPATIENT
Start: 2022-03-23 | End: 2022-03-23 | Stop reason: SURG

## 2022-03-23 RX ORDER — SODIUM CHLORIDE 0.9 % (FLUSH) 0.9 %
3 SYRINGE (ML) INJECTION EVERY 12 HOURS SCHEDULED
Status: DISCONTINUED | OUTPATIENT
Start: 2022-03-23 | End: 2022-03-23 | Stop reason: HOSPADM

## 2022-03-23 RX ORDER — OXYCODONE AND ACETAMINOPHEN 7.5; 325 MG/1; MG/1
2 TABLET ORAL EVERY 4 HOURS PRN
Status: DISCONTINUED | OUTPATIENT
Start: 2022-03-23 | End: 2022-03-23 | Stop reason: HOSPADM

## 2022-03-23 RX ORDER — LIDOCAINE HYDROCHLORIDE 10 MG/ML
0.5 INJECTION, SOLUTION EPIDURAL; INFILTRATION; INTRACAUDAL; PERINEURAL ONCE AS NEEDED
Status: DISCONTINUED | OUTPATIENT
Start: 2022-03-23 | End: 2022-03-23 | Stop reason: HOSPADM

## 2022-03-23 RX ORDER — CEFDINIR 300 MG/1
300 CAPSULE ORAL 2 TIMES DAILY
Qty: 14 CAPSULE | Refills: 0 | Status: SHIPPED | OUTPATIENT
Start: 2022-03-23 | End: 2022-03-28 | Stop reason: SDUPTHER

## 2022-03-23 RX ORDER — NEOSTIGMINE METHYLSULFATE 5 MG/5 ML
SYRINGE (ML) INTRAVENOUS AS NEEDED
Status: DISCONTINUED | OUTPATIENT
Start: 2022-03-23 | End: 2022-03-23 | Stop reason: SURG

## 2022-03-23 RX ORDER — ROCURONIUM BROMIDE 10 MG/ML
INJECTION, SOLUTION INTRAVENOUS AS NEEDED
Status: DISCONTINUED | OUTPATIENT
Start: 2022-03-23 | End: 2022-03-23 | Stop reason: SURG

## 2022-03-23 RX ORDER — SODIUM CHLORIDE 0.9 % (FLUSH) 0.9 %
3-10 SYRINGE (ML) INJECTION AS NEEDED
Status: DISCONTINUED | OUTPATIENT
Start: 2022-03-23 | End: 2022-03-23 | Stop reason: HOSPADM

## 2022-03-23 RX ORDER — MAGNESIUM HYDROXIDE 1200 MG/15ML
LIQUID ORAL AS NEEDED
Status: DISCONTINUED | OUTPATIENT
Start: 2022-03-23 | End: 2022-03-23 | Stop reason: HOSPADM

## 2022-03-23 RX ORDER — HYDROCODONE BITARTRATE AND ACETAMINOPHEN 7.5; 325 MG/1; MG/1
1 TABLET ORAL ONCE AS NEEDED
Status: DISCONTINUED | OUTPATIENT
Start: 2022-03-23 | End: 2022-03-23 | Stop reason: HOSPADM

## 2022-03-23 RX ORDER — SODIUM CHLORIDE 0.9 % (FLUSH) 0.9 %
3 SYRINGE (ML) INJECTION AS NEEDED
Status: DISCONTINUED | OUTPATIENT
Start: 2022-03-23 | End: 2022-03-23 | Stop reason: HOSPADM

## 2022-03-23 RX ORDER — LIDOCAINE HYDROCHLORIDE 40 MG/ML
SOLUTION TOPICAL AS NEEDED
Status: DISCONTINUED | OUTPATIENT
Start: 2022-03-23 | End: 2022-03-23 | Stop reason: SURG

## 2022-03-23 RX ORDER — IBUPROFEN 600 MG/1
600 TABLET ORAL ONCE AS NEEDED
Status: DISCONTINUED | OUTPATIENT
Start: 2022-03-23 | End: 2022-03-23 | Stop reason: HOSPADM

## 2022-03-23 RX ORDER — PHENAZOPYRIDINE HYDROCHLORIDE 100 MG/1
100 TABLET, FILM COATED ORAL 3 TIMES DAILY PRN
Qty: 20 TABLET | Refills: 1 | Status: SHIPPED | OUTPATIENT
Start: 2022-03-23 | End: 2022-06-24

## 2022-03-23 RX ORDER — KETOROLAC TROMETHAMINE 10 MG/1
10 TABLET, FILM COATED ORAL EVERY 6 HOURS PRN
Qty: 12 TABLET | Refills: 0 | Status: SHIPPED | OUTPATIENT
Start: 2022-03-23 | End: 2022-06-24

## 2022-03-23 RX ORDER — CEFAZOLIN SODIUM 1 G/3ML
INJECTION, POWDER, FOR SOLUTION INTRAMUSCULAR; INTRAVENOUS AS NEEDED
Status: DISCONTINUED | OUTPATIENT
Start: 2022-03-23 | End: 2022-03-23 | Stop reason: SURG

## 2022-03-23 RX ORDER — FLUMAZENIL 0.1 MG/ML
0.2 INJECTION INTRAVENOUS AS NEEDED
Status: DISCONTINUED | OUTPATIENT
Start: 2022-03-23 | End: 2022-03-23 | Stop reason: HOSPADM

## 2022-03-23 RX ORDER — PROPOFOL 10 MG/ML
VIAL (ML) INTRAVENOUS AS NEEDED
Status: DISCONTINUED | OUTPATIENT
Start: 2022-03-23 | End: 2022-03-23 | Stop reason: SURG

## 2022-03-23 RX ORDER — ONDANSETRON 2 MG/ML
4 INJECTION INTRAMUSCULAR; INTRAVENOUS ONCE AS NEEDED
Status: DISCONTINUED | OUTPATIENT
Start: 2022-03-23 | End: 2022-03-23 | Stop reason: HOSPADM

## 2022-03-23 RX ORDER — HYDROCODONE BITARTRATE AND ACETAMINOPHEN 7.5; 325 MG/1; MG/1
1 TABLET ORAL EVERY 6 HOURS PRN
Qty: 15 TABLET | Refills: 0 | Status: SHIPPED | OUTPATIENT
Start: 2022-03-23 | End: 2022-04-06 | Stop reason: SDUPTHER

## 2022-03-23 RX ORDER — EPHEDRINE SULFATE 50 MG/ML
INJECTION, SOLUTION INTRAVENOUS AS NEEDED
Status: DISCONTINUED | OUTPATIENT
Start: 2022-03-23 | End: 2022-03-23 | Stop reason: SURG

## 2022-03-23 RX ORDER — MIDAZOLAM HYDROCHLORIDE 1 MG/ML
1 INJECTION INTRAMUSCULAR; INTRAVENOUS
Status: DISCONTINUED | OUTPATIENT
Start: 2022-03-23 | End: 2022-03-23 | Stop reason: HOSPADM

## 2022-03-23 RX ORDER — ONDANSETRON 2 MG/ML
INJECTION INTRAMUSCULAR; INTRAVENOUS AS NEEDED
Status: DISCONTINUED | OUTPATIENT
Start: 2022-03-23 | End: 2022-03-23 | Stop reason: SURG

## 2022-03-23 RX ORDER — OXYCODONE AND ACETAMINOPHEN 10; 325 MG/1; MG/1
1 TABLET ORAL ONCE AS NEEDED
Status: DISCONTINUED | OUTPATIENT
Start: 2022-03-23 | End: 2022-03-23 | Stop reason: HOSPADM

## 2022-03-23 RX ADMIN — CEFAZOLIN 2 G: 330 INJECTION, POWDER, FOR SOLUTION INTRAMUSCULAR; INTRAVENOUS at 17:02

## 2022-03-23 RX ADMIN — FENTANYL CITRATE 100 MCG: 50 INJECTION, SOLUTION INTRAMUSCULAR; INTRAVENOUS at 16:48

## 2022-03-23 RX ADMIN — SODIUM CHLORIDE, POTASSIUM CHLORIDE, SODIUM LACTATE AND CALCIUM CHLORIDE 1000 ML: 600; 310; 30; 20 INJECTION, SOLUTION INTRAVENOUS at 12:51

## 2022-03-23 RX ADMIN — PROPOFOL 200 MG: 10 INJECTION, EMULSION INTRAVENOUS at 16:48

## 2022-03-23 RX ADMIN — ROCURONIUM BROMIDE 30 MG: 10 SOLUTION INTRAVENOUS at 16:48

## 2022-03-23 RX ADMIN — LIDOCAINE HYDROCHLORIDE 40 MG: 20 INJECTION, SOLUTION EPIDURAL; INFILTRATION; INTRACAUDAL; PERINEURAL at 16:48

## 2022-03-23 RX ADMIN — Medication 3 MG: at 17:45

## 2022-03-23 RX ADMIN — EPHEDRINE SULFATE 10 MG: 50 INJECTION INTRAVENOUS at 17:24

## 2022-03-23 RX ADMIN — LIDOCAINE HYDROCHLORIDE 1 EACH: 40 SOLUTION TOPICAL at 16:48

## 2022-03-23 RX ADMIN — ONDANSETRON 4 MG: 2 INJECTION INTRAMUSCULAR; INTRAVENOUS at 17:45

## 2022-03-23 RX ADMIN — DROPERIDOL 0.62 MG: 2.5 INJECTION, SOLUTION INTRAMUSCULAR; INTRAVENOUS at 18:05

## 2022-03-23 RX ADMIN — PROPOFOL 50 MG: 10 INJECTION, EMULSION INTRAVENOUS at 17:45

## 2022-03-23 RX ADMIN — ACETAMINOPHEN 1000 MG: 500 TABLET, FILM COATED ORAL at 15:53

## 2022-03-23 RX ADMIN — FENTANYL CITRATE 25 MCG: 50 INJECTION INTRAMUSCULAR; INTRAVENOUS at 18:10

## 2022-03-23 RX ADMIN — OXYCODONE HYDROCHLORIDE AND ACETAMINOPHEN 2 TABLET: 7.5; 325 TABLET ORAL at 18:13

## 2022-03-23 RX ADMIN — FENTANYL CITRATE 25 MCG: 50 INJECTION INTRAMUSCULAR; INTRAVENOUS at 18:05

## 2022-03-23 NOTE — OP NOTE
OP NOTE  Denise Ahn    Date of Procedure: 3/23/2022    Pre-op Diagnosis:   Right ureteral stone [N20.1]  Right flank pain [R10.9]    Post-op Diagnosis:     Post-Op Diagnosis Codes:     * Right ureteral stone [N20.1]     * Right flank pain [R10.9]    Procedure/CPT® Codes:      Procedure(s):   RIGHT EXTRACORPOREAL SHOCKWAVE LITHOTRIPSY, CYSTO RIGHT URETERAL STENT PLACEMENT    Surgeon(s):  Michele Chavez MD    Anesthesia: General    Staff:   Circulator: Nirmala Vuong RN  Scrub Person: Vidhi Schaefer  Vendor Representative: Kaz Simms    Indications:   41-year-old female with a 1.5 cm right renal pelvis stone.   After discussion of options, patient has given informed consent to undergo right ESWL.  All risks, benefits, and alternatives were discussed.  She understands the need for concomitant stent placement due to the very large stone size.    Operative Report: The patient is identified. The KUB is reviewed. After answering any questions, patient was brought to the operating room and placed on the patient table of the [facility].  General endotracheal anesthesia was administered.  Preoperative KUB was reviewed.   An [facility] c-arm fluoroscope was used to identify the stone.    The patient was positioned in the dorsal lithotomy position and her genitalia were prepped and draped in usual sterile fashion.  A final timeout was performed.  I placed a 23 Divehi rigid cystoscope into the bladder and identified the right ureteral orifice.  I placed a 0.035 sensor guidewire up to the right kidney under fluoroscopy.  I then placed a 6 Divehi by 26 cm double-J ureteral stent over the wire in the standard fashion.  After curl was obtained in the right kidney, the wire was completely removed and a curl was obtained the bladder.  The string was removed from the stent prior to placement.  Bladder was drained and attention was then turned to the lithotripsy.  She was returned to the supine position and positioned  under the lithotripter to target the stone using biplanar fluoroscopy.    The shock-head is brought into position.  This stone is brought into the F2 plane for focus.  Treatment was initiated.  We gradually increased the energy level from 1 to 7.  This stone was treated at a rate of 60 for the first 300 shocks followed by rate of 90.  We paused for 2 minutes after 300 shocks to reduce risk of renal damage.  The stone was periodically checked to make sure that we were on it and getting good breakup.  We checked at 700, 1000, 1500, 2000, and 2500 shocks.  The stone did have good breakup.  I felt it was unnecessary to place a ureteral stent.  At the conclusion of 3000 shocks, the patient was awakened from anesthesia and transferred to the recovery room in satisfactory condition.      Estimated Blood Loss: <30 mL    Specimens:                None      Drains: Right 6 Italian by 26 cm double-J ureteral stent without string  Ureteral Drain/Stent Right ureter  (Active)   Site Assessment SOCORRO 03/23/22 1815   Dressing Type Open to air 03/23/22 1815       Complications: none  Plan: Repeat KUB in a few weeks. Home today if no evidence of infection or significant bleeding.     Michele Chavez MD     Date: 3/23/2022  Time: 18:27 CDT

## 2022-03-23 NOTE — ANESTHESIA PREPROCEDURE EVALUATION
Anesthesia Evaluation     history of anesthetic complications (svt during general anesthesia years ago):  NPO Solid Status: > 8 hours  NPO Liquid Status: > 8 hours           Airway   Mallampati: I  TM distance: >3 FB  Neck ROM: full  No difficulty expected  Dental      Pulmonary    (+) a smoker Current,   Cardiovascular   Exercise tolerance: good (4-7 METS)    ECG reviewed    (+) dysrhythmias (SVT),       Neuro/Psych  (-) seizures, TIA, CVA  GI/Hepatic/Renal/Endo    (+)   renal disease stones, thyroid problem (graves disease)   (-) diabetes    Musculoskeletal     Abdominal    Substance History      OB/GYN          Other                        Anesthesia Plan    ASA 2     general     intravenous induction     Anesthetic plan, all risks, benefits, and alternatives have been provided, discussed and informed consent has been obtained with: patient.        CODE STATUS:

## 2022-03-23 NOTE — INTERVAL H&P NOTE
H&P reviewed. The patient was examined and there are no changes to the H&P.  I previously discussed options for stone management with the patient in the office including observation, ureteroscopy laser lithotripsy, and extracorporeal shockwave lithotripsy.  She has been treated with culture specific antibiotics for the last week.  KUB x-ray today shows a 1.5 cm right renal pelvis stone.  Due to the large nature of the stone, patient understands need for concomitant stent placement.  She would like to proceed today with right extracorporeal shock with lithotripsy.  We previously discussed risks of the procedure including but not limited to infection, bleeding, need for additional procedures, inability to break up/pass any/all stone, complications of anesthesia, need for ureteral stent.  She voices understanding and provide informed said to proceed today.

## 2022-03-23 NOTE — ANESTHESIA PROCEDURE NOTES
Airway  Urgency: elective    Date/Time: 3/23/2022 4:49 PM  Airway not difficult    General Information and Staff    Patient location during procedure: OR  CRNA: Valdemar Castellon CRNA    Indications and Patient Condition    Preoxygenated: yes  Mask difficulty assessment: 1 - vent by mask    Final Airway Details  Final airway type: endotracheal airway      Successful airway: ETT  Cuffed: yes   Successful intubation technique: video laryngoscopy  Facilitating devices/methods: intubating stylet  Endotracheal tube insertion site: oral  Blade: De Los Santos  Blade size: 3  ETT size (mm): 7.5  Cormack-Lehane Classification: grade I - full view of glottis  Placement verified by: capnometry and palpation of cuff   Measured from: lips  ETT/EBT  to lips (cm): 22  Number of attempts at approach: 1  Assessment: lips, teeth, and gum same as pre-op and atraumatic intubation

## 2022-03-24 ENCOUNTER — TELEPHONE (OUTPATIENT)
Dept: UROLOGY | Facility: CLINIC | Age: 42
End: 2022-03-24

## 2022-03-24 NOTE — TELEPHONE ENCOUNTER
----- Message from Shanti Doe MA sent at 3/24/2022  9:00 AM CDT -----  Regarding: FW: Needs cystoscopy stent pull appointment on 4/6/2022 with shan CASPER    ----- Message -----  From: Michele Chavez MD  Sent: 3/23/2022   9:14 PM CDT  To: Shanti Doe MA, #  Subject: Needs cystoscopy stent pull appointment on 4#    Please call Mrs. Ahn and schedule her with shan CASPER for cystoscopy stent pull appointment on 4/6 thank you

## 2022-03-24 NOTE — ANESTHESIA POSTPROCEDURE EVALUATION
Patient: Denise Ahn    Procedure Summary     Date: 03/23/22 Room / Location:  [facility] OR 09 /  [facility]    Anesthesia Start: 1646 Anesthesia Stop: 1756    Procedure: EXTRACORPOREAL SHOCKWAVE LITHOTRIPSY  RIGHT CYSTO STENT PLACEMENT (Right ) Diagnosis:       Right ureteral stone      Right flank pain      (Right ureteral stone [N20.1])      (Right flank pain [R10.9])    Surgeons: Michele Chavez MD Provider: Valdemar Castellon CRNA    Anesthesia Type: general ASA Status: 2          Anesthesia Type: general    Vitals  Vitals Value Taken Time   /60 03/23/22 1839   Temp 97 °F (36.1 °C) 03/23/22 1830   Pulse 59 03/23/22 1839   Resp 16 03/23/22 1839   SpO2 97 % 03/23/22 1839           Post Anesthesia Care and Evaluation    Patient location during evaluation: PACU  Patient participation: complete - patient participated  Level of consciousness: awake  Pain management: adequate  Airway patency: patent  Anesthetic complications: No anesthetic complications  PONV Status: none  Cardiovascular status: acceptable  Respiratory status: acceptable  Hydration status: acceptable  No anesthesia care post op

## 2022-03-24 NOTE — TELEPHONE ENCOUNTER
Called patient and left a voicemail with appointment information for her cysto stent removal on 4/6/22, at 10:20am with Dr. Chavez.

## 2022-03-28 ENCOUNTER — HOSPITAL ENCOUNTER (EMERGENCY)
Facility: HOSPITAL | Age: 42
Discharge: HOME | End: 2022-03-28
Admitting: FAMILY MEDICINE

## 2022-03-28 ENCOUNTER — APPOINTMENT (OUTPATIENT)
Dept: CT IMAGING | Facility: HOSPITAL | Age: 42
End: 2022-03-28

## 2022-03-28 ENCOUNTER — TELEPHONE (OUTPATIENT)
Dept: UROLOGY | Facility: CLINIC | Age: 42
End: 2022-03-28

## 2022-03-28 VITALS
HEIGHT: 70 IN | BODY MASS INDEX: 26.34 KG/M2 | DIASTOLIC BLOOD PRESSURE: 74 MMHG | SYSTOLIC BLOOD PRESSURE: 135 MMHG | TEMPERATURE: 97.8 F | RESPIRATION RATE: 18 BRPM | HEART RATE: 62 BPM | OXYGEN SATURATION: 98 % | WEIGHT: 184 LBS

## 2022-03-28 DIAGNOSIS — N20.0 RIGHT KIDNEY STONE: ICD-10-CM

## 2022-03-28 DIAGNOSIS — R10.9 RIGHT FLANK PAIN: ICD-10-CM

## 2022-03-28 DIAGNOSIS — N39.0 URINARY TRACT INFECTION WITH HEMATURIA, SITE UNSPECIFIED: Primary | ICD-10-CM

## 2022-03-28 DIAGNOSIS — R31.9 URINARY TRACT INFECTION WITH HEMATURIA, SITE UNSPECIFIED: Primary | ICD-10-CM

## 2022-03-28 DIAGNOSIS — N20.1 RIGHT URETERAL STONE: ICD-10-CM

## 2022-03-28 LAB
ALBUMIN SERPL-MCNC: 4.3 G/DL (ref 3.5–5.2)
ALBUMIN/GLOB SERPL: 1.7 G/DL
ALP SERPL-CCNC: 46 U/L (ref 39–117)
ALT SERPL W P-5'-P-CCNC: 9 U/L (ref 1–33)
ANION GAP SERPL CALCULATED.3IONS-SCNC: 9 MMOL/L (ref 5–15)
AST SERPL-CCNC: 14 U/L (ref 1–32)
B-HCG UR QL: NEGATIVE
BACTERIA UR QL AUTO: ABNORMAL /HPF
BASOPHILS # BLD AUTO: 0.05 10*3/MM3 (ref 0–0.2)
BASOPHILS NFR BLD AUTO: 0.4 % (ref 0–1.5)
BILIRUB SERPL-MCNC: 0.2 MG/DL (ref 0–1.2)
BILIRUB UR QL STRIP: NEGATIVE
BUN SERPL-MCNC: 16 MG/DL (ref 6–20)
BUN/CREAT SERPL: 18.2 (ref 7–25)
CALCIUM SPEC-SCNC: 9.2 MG/DL (ref 8.6–10.5)
CHLORIDE SERPL-SCNC: 104 MMOL/L (ref 98–107)
CLARITY UR: ABNORMAL
CO2 SERPL-SCNC: 25 MMOL/L (ref 22–29)
COLOR UR: ABNORMAL
CREAT SERPL-MCNC: 0.88 MG/DL (ref 0.57–1)
D-LACTATE SERPL-SCNC: 0.7 MMOL/L (ref 0.5–2)
DEPRECATED RDW RBC AUTO: 46.6 FL (ref 37–54)
EGFRCR SERPLBLD CKD-EPI 2021: 85.9 ML/MIN/1.73
EOSINOPHIL # BLD AUTO: 0.35 10*3/MM3 (ref 0–0.4)
EOSINOPHIL NFR BLD AUTO: 2.8 % (ref 0.3–6.2)
ERYTHROCYTE [DISTWIDTH] IN BLOOD BY AUTOMATED COUNT: 13.6 % (ref 12.3–15.4)
EXPIRATION DATE: NORMAL
GLOBULIN UR ELPH-MCNC: 2.6 GM/DL
GLUCOSE SERPL-MCNC: 102 MG/DL (ref 65–99)
GLUCOSE UR STRIP-MCNC: NEGATIVE MG/DL
HCT VFR BLD AUTO: 39.9 % (ref 34–46.6)
HGB BLD-MCNC: 12.8 G/DL (ref 12–15.9)
HGB UR QL STRIP.AUTO: ABNORMAL
HYALINE CASTS UR QL AUTO: ABNORMAL /LPF
IMM GRANULOCYTES # BLD AUTO: 0.19 10*3/MM3 (ref 0–0.05)
IMM GRANULOCYTES NFR BLD AUTO: 1.5 % (ref 0–0.5)
INR PPP: 0.97 (ref 0.91–1.09)
INTERNAL NEGATIVE CONTROL: NORMAL
INTERNAL POSITIVE CONTROL: NORMAL
KETONES UR QL STRIP: NEGATIVE
LEUKOCYTE ESTERASE UR QL STRIP.AUTO: ABNORMAL
LIPASE SERPL-CCNC: 15 U/L (ref 13–60)
LYMPHOCYTES # BLD AUTO: 2.13 10*3/MM3 (ref 0.7–3.1)
LYMPHOCYTES NFR BLD AUTO: 17.3 % (ref 19.6–45.3)
Lab: NORMAL
MCH RBC QN AUTO: 29.6 PG (ref 26.6–33)
MCHC RBC AUTO-ENTMCNC: 32.1 G/DL (ref 31.5–35.7)
MCV RBC AUTO: 92.1 FL (ref 79–97)
MONOCYTES # BLD AUTO: 0.96 10*3/MM3 (ref 0.1–0.9)
MONOCYTES NFR BLD AUTO: 7.8 % (ref 5–12)
NEUTROPHILS NFR BLD AUTO: 70.2 % (ref 42.7–76)
NEUTROPHILS NFR BLD AUTO: 8.61 10*3/MM3 (ref 1.7–7)
NITRITE UR QL STRIP: POSITIVE
NRBC BLD AUTO-RTO: 0 /100 WBC (ref 0–0.2)
PH UR STRIP.AUTO: 5.5 [PH] (ref 5–8)
PLATELET # BLD AUTO: 197 10*3/MM3 (ref 140–450)
PMV BLD AUTO: 10 FL (ref 6–12)
POTASSIUM SERPL-SCNC: 4.2 MMOL/L (ref 3.5–5.2)
PROCALCITONIN SERPL-MCNC: <0.2 NG/ML (ref 0–0.25)
PROT SERPL-MCNC: 6.9 G/DL (ref 6–8.5)
PROT UR QL STRIP: ABNORMAL
PROTHROMBIN TIME: 12.5 SECONDS (ref 11.9–14.6)
RBC # BLD AUTO: 4.33 10*6/MM3 (ref 3.77–5.28)
RBC # UR STRIP: ABNORMAL /HPF
REF LAB TEST METHOD: ABNORMAL
SODIUM SERPL-SCNC: 138 MMOL/L (ref 136–145)
SP GR UR STRIP: 1.02 (ref 1–1.03)
SQUAMOUS #/AREA URNS HPF: ABNORMAL /HPF
UROBILINOGEN UR QL STRIP: ABNORMAL
WBC # UR STRIP: ABNORMAL /HPF
WBC NRBC COR # BLD: 12.29 10*3/MM3 (ref 3.4–10.8)

## 2022-03-28 PROCEDURE — 85025 COMPLETE CBC W/AUTO DIFF WBC: CPT | Performed by: NURSE PRACTITIONER

## 2022-03-28 PROCEDURE — 80053 COMPREHEN METABOLIC PANEL: CPT | Performed by: NURSE PRACTITIONER

## 2022-03-28 PROCEDURE — 25010000002 KETOROLAC TROMETHAMINE PER 15 MG: Performed by: NURSE PRACTITIONER

## 2022-03-28 PROCEDURE — 81025 URINE PREGNANCY TEST: CPT | Performed by: NURSE PRACTITIONER

## 2022-03-28 PROCEDURE — 83690 ASSAY OF LIPASE: CPT | Performed by: NURSE PRACTITIONER

## 2022-03-28 PROCEDURE — 83605 ASSAY OF LACTIC ACID: CPT | Performed by: NURSE PRACTITIONER

## 2022-03-28 PROCEDURE — 0 HYDROMORPHONE 1 MG/ML SOLUTION: Performed by: NURSE PRACTITIONER

## 2022-03-28 PROCEDURE — 96374 THER/PROPH/DIAG INJ IV PUSH: CPT

## 2022-03-28 PROCEDURE — 25010000002 ONDANSETRON PER 1 MG: Performed by: NURSE PRACTITIONER

## 2022-03-28 PROCEDURE — 85610 PROTHROMBIN TIME: CPT | Performed by: NURSE PRACTITIONER

## 2022-03-28 PROCEDURE — 87040 BLOOD CULTURE FOR BACTERIA: CPT | Performed by: NURSE PRACTITIONER

## 2022-03-28 PROCEDURE — 25010000002 CEFTRIAXONE PER 250 MG: Performed by: NURSE PRACTITIONER

## 2022-03-28 PROCEDURE — 87086 URINE CULTURE/COLONY COUNT: CPT | Performed by: NURSE PRACTITIONER

## 2022-03-28 PROCEDURE — 99283 EMERGENCY DEPT VISIT LOW MDM: CPT

## 2022-03-28 PROCEDURE — 96376 TX/PRO/DX INJ SAME DRUG ADON: CPT

## 2022-03-28 PROCEDURE — 81001 URINALYSIS AUTO W/SCOPE: CPT | Performed by: NURSE PRACTITIONER

## 2022-03-28 PROCEDURE — 96375 TX/PRO/DX INJ NEW DRUG ADDON: CPT

## 2022-03-28 PROCEDURE — 84145 PROCALCITONIN (PCT): CPT | Performed by: NURSE PRACTITIONER

## 2022-03-28 PROCEDURE — 74176 CT ABD & PELVIS W/O CONTRAST: CPT

## 2022-03-28 RX ORDER — HYDROCODONE BITARTRATE AND ACETAMINOPHEN 7.5; 325 MG/1; MG/1
1 TABLET ORAL EVERY 6 HOURS PRN
Qty: 12 TABLET | Refills: 0 | Status: SHIPPED | OUTPATIENT
Start: 2022-03-28 | End: 2022-03-31

## 2022-03-28 RX ORDER — KETOROLAC TROMETHAMINE 10 MG/1
10 TABLET, FILM COATED ORAL EVERY 8 HOURS PRN
Qty: 6 TABLET | Refills: 0 | Status: SHIPPED | OUTPATIENT
Start: 2022-03-28 | End: 2022-03-31

## 2022-03-28 RX ORDER — TAMSULOSIN HYDROCHLORIDE 0.4 MG/1
1 CAPSULE ORAL DAILY
Qty: 30 CAPSULE | Refills: 0 | Status: SHIPPED | OUTPATIENT
Start: 2022-03-28 | End: 2022-06-24

## 2022-03-28 RX ORDER — SODIUM CHLORIDE 0.9 % (FLUSH) 0.9 %
10 SYRINGE (ML) INJECTION AS NEEDED
Status: DISCONTINUED | OUTPATIENT
Start: 2022-03-28 | End: 2022-03-28 | Stop reason: HOSPADM

## 2022-03-28 RX ORDER — ONDANSETRON 2 MG/ML
4 INJECTION INTRAMUSCULAR; INTRAVENOUS ONCE
Status: COMPLETED | OUTPATIENT
Start: 2022-03-28 | End: 2022-03-28

## 2022-03-28 RX ORDER — KETOROLAC TROMETHAMINE 30 MG/ML
30 INJECTION, SOLUTION INTRAMUSCULAR; INTRAVENOUS ONCE
Status: COMPLETED | OUTPATIENT
Start: 2022-03-28 | End: 2022-03-28

## 2022-03-28 RX ORDER — CEFDINIR 300 MG/1
300 CAPSULE ORAL 2 TIMES DAILY
Qty: 10 CAPSULE | Refills: 0 | Status: SHIPPED | OUTPATIENT
Start: 2022-03-28 | End: 2022-04-02

## 2022-03-28 RX ADMIN — HYDROMORPHONE HYDROCHLORIDE 1 MG: 1 INJECTION, SOLUTION INTRAMUSCULAR; INTRAVENOUS; SUBCUTANEOUS at 19:52

## 2022-03-28 RX ADMIN — SODIUM CHLORIDE, POTASSIUM CHLORIDE, SODIUM LACTATE AND CALCIUM CHLORIDE 1000 ML: 600; 310; 30; 20 INJECTION, SOLUTION INTRAVENOUS at 17:02

## 2022-03-28 RX ADMIN — KETOROLAC TROMETHAMINE 30 MG: 30 INJECTION, SOLUTION INTRAMUSCULAR; INTRAVENOUS at 15:47

## 2022-03-28 RX ADMIN — WATER 1 G: 100 INJECTION, SOLUTION INTRAVENOUS at 19:51

## 2022-03-28 RX ADMIN — ONDANSETRON HYDROCHLORIDE 4 MG: 2 SOLUTION INTRAMUSCULAR; INTRAVENOUS at 16:34

## 2022-03-28 RX ADMIN — HYDROMORPHONE HYDROCHLORIDE 1 MG: 1 INJECTION, SOLUTION INTRAMUSCULAR; INTRAVENOUS; SUBCUTANEOUS at 16:34

## 2022-03-28 RX ADMIN — ONDANSETRON 4 MG: 2 INJECTION INTRAMUSCULAR; INTRAVENOUS at 19:51

## 2022-03-28 NOTE — TELEPHONE ENCOUNTER
Provider: DR. CHAN  Caller: CHAPIN RICHARDS  Relationship to Patient: SELF    Phone Number: [phone]    Reason for Call: PATIENT HAD SURGERY 3/23/22 AND WENT BACK TO WORK TODAY. SHE STATES SHE IS IN PAIN, FEELS TERRIBLE AND SHAKING. HUB WARM TRANSFERRED TO CLINICAL LINE & WAS INSTRUCTED TO DIRECT PATIENT TO ER IF SHE IS IN THAT MUCH PAIN & THAT THERE IS A UROLOGIST ON CALL THERE.     PATIENT STATES SHE WILL TELL HER BOSS AND GO TO THE  ER. IT MAY BE A LITTLE WHILE SINCE SHE IS IN [place].

## 2022-03-29 LAB — BACTERIA SPEC AEROBE CULT: NO GROWTH

## 2022-03-30 LAB — BACTERIA SPEC AEROBE CULT: NO GROWTH

## 2022-04-02 LAB
BACTERIA SPEC AEROBE CULT: NORMAL
BACTERIA SPEC AEROBE CULT: NORMAL

## 2022-04-04 ENCOUNTER — TELEPHONE (OUTPATIENT)
Dept: UROLOGY | Facility: CLINIC | Age: 42
End: 2022-04-04

## 2022-04-04 DIAGNOSIS — N20.1 RIGHT URETERAL STONE: Primary | ICD-10-CM

## 2022-04-04 NOTE — TELEPHONE ENCOUNTER
----- Message from Michele Chavez MD sent at 4/4/2022  3:38 PM CDT -----  Regarding: I need this patient to get a KUB prior to her appt this W[facility]  I need to look at her KUB before she is set up for a cysto,,,    Thank you

## 2022-04-05 ENCOUNTER — TELEPHONE (OUTPATIENT)
Dept: UROLOGY | Facility: CLINIC | Age: 42
End: 2022-04-05

## 2022-04-06 ENCOUNTER — PROCEDURE VISIT (OUTPATIENT)
Dept: UROLOGY | Facility: CLINIC | Age: 42
End: 2022-04-06

## 2022-04-06 ENCOUNTER — HOSPITAL ENCOUNTER (OUTPATIENT)
Dept: GENERAL RADIOLOGY | Facility: HOSPITAL | Age: 42
Discharge: HOME | End: 2022-04-06
Admitting: UROLOGY

## 2022-04-06 DIAGNOSIS — N20.0 RIGHT KIDNEY STONE: ICD-10-CM

## 2022-04-06 DIAGNOSIS — Z48.89 POSTOPERATIVE VISIT: Primary | ICD-10-CM

## 2022-04-06 PROCEDURE — 52310 CYSTOSCOPY AND TREATMENT: CPT | Performed by: UROLOGY

## 2022-04-06 PROCEDURE — 74018 RADEX ABDOMEN 1 VIEW: CPT

## 2022-04-06 RX ORDER — HYDROCODONE BITARTRATE AND ACETAMINOPHEN 7.5; 325 MG/1; MG/1
1 TABLET ORAL EVERY 4 HOURS PRN
Qty: 6 TABLET | Refills: 0 | Status: SHIPPED | OUTPATIENT
Start: 2022-04-06 | End: 2022-06-24

## 2022-04-07 NOTE — PROGRESS NOTES
CC: I am here for the doctor to look at my bladder and get this stent out.     Cystoscopy with stent removal    Indications: Status post ESWL    Prep: Hibiclens solution    Instrumentation:Rigid cystoscope with 22 French sheath and 30° lens and Alligator grasping forceps    Procedure: After lidocaine jelly is instilled into the urethra for 10 minutes, the well-lubricated cystoscope was introduced through the urethra into the bladder.  The stent is seen protruding from the right side.  The alligator forceps or used to grasp the stent and pull it out the urethra.  The patient tolerated the procedure well.    Diagnoses and all orders for this visit:    1. Postoperative visit (Primary)  -     US Renal Bilateral; Future    2. Right kidney stone  -     HYDROcodone-acetaminophen (NORCO) 7.5-325 MG per tablet; Take 1 tablet by mouth Every 4 (Four) Hours As Needed for Severe Pain  (postop pain).  Dispense: 6 tablet; Refill: 0                Follow up:    Routine follow up-with me in July with preclinic renal ultrasound.  She was given a few more hydrocodone as needed for stent related discomfort/spasm after removal today.    Michele Chavez MD  4/7/2022  11:49 CDT

## 2022-06-24 ENCOUNTER — OFFICE VISIT (OUTPATIENT)
Dept: FAMILY MEDICINE CLINIC | Facility: CLINIC | Age: 42
End: 2022-06-24

## 2022-06-24 VITALS
TEMPERATURE: 98.2 F | HEIGHT: 70 IN | SYSTOLIC BLOOD PRESSURE: 127 MMHG | OXYGEN SATURATION: 98 % | HEART RATE: 80 BPM | RESPIRATION RATE: 16 BRPM | WEIGHT: 179.6 LBS | DIASTOLIC BLOOD PRESSURE: 72 MMHG | BODY MASS INDEX: 25.71 KG/M2

## 2022-06-24 DIAGNOSIS — I47.1 SVT (SUPRAVENTRICULAR TACHYCARDIA): Primary | ICD-10-CM

## 2022-06-24 DIAGNOSIS — E05.00 GRAVES DISEASE: ICD-10-CM

## 2022-06-24 PROCEDURE — 99203 OFFICE O/P NEW LOW 30 MIN: CPT | Performed by: NURSE PRACTITIONER

## 2022-06-24 RX ORDER — MULTIPLE VITAMINS W/ MINERALS TAB 9MG-400MCG
1 TAB ORAL DAILY
COMMUNITY

## 2022-06-24 RX ORDER — ATENOLOL 25 MG/1
12.5 TABLET ORAL DAILY
Qty: 45 TABLET | Refills: 1 | Status: SHIPPED | OUTPATIENT
Start: 2022-06-24 | End: 2022-08-15 | Stop reason: SDUPTHER

## 2022-06-24 RX ORDER — ACETAMINOPHEN AND CODEINE PHOSPHATE 120; 12 MG/5ML; MG/5ML
1 SOLUTION ORAL DAILY
COMMUNITY

## 2022-06-24 NOTE — PROGRESS NOTES
"Chief Complaint  Graves' Disease (Patient states that she is here today to establish care and to get refills on her atenolol 25 mg.)    Subjective        Denise Ahn presents to [facility]  Patient is here today to establish care in our office.  She has a history of SVT and Graves' disease.  She was following with Dr. Baldwin in [place].  However she has recently moved to [place] and does not want to drive back there      Objective   Vital Signs:  /72 (BP Location: Right arm, Patient Position: Sitting, Cuff Size: Adult)   Pulse 80   Temp 98.2 °F (36.8 °C) (Temporal)   Resp 16   Ht 177.8 cm (70\")   Wt 81.5 kg (179 lb 9.6 oz)   SpO2 98%   BMI 25.77 kg/m²   Estimated body mass index is 25.77 kg/m² as calculated from the following:    Height as of this encounter: 177.8 cm (70\").    Weight as of this encounter: 81.5 kg (179 lb 9.6 oz).        Physical Exam  Vitals and nursing note reviewed.   Constitutional:       Appearance: Normal appearance. She is well-developed.   HENT:      Head: Normocephalic and atraumatic.      Right Ear: Tympanic membrane, ear canal and external ear normal.      Left Ear: Tympanic membrane, ear canal and external ear normal.      Nose: Nose normal. No septal deviation, nasal tenderness or congestion.      Mouth/Throat:      Lips: Pink. No lesions.      Mouth: Mucous membranes are moist. No oral lesions.      Dentition: Normal dentition.      Pharynx: Oropharynx is clear. No pharyngeal swelling, oropharyngeal exudate or posterior oropharyngeal erythema.   Eyes:      General: Lids are normal. Vision grossly intact. No scleral icterus.        Right eye: No discharge.         Left eye: No discharge.      Extraocular Movements: Extraocular movements intact.      Conjunctiva/sclera: Conjunctivae normal.      Right eye: Right conjunctiva is not injected.      Left eye: Left conjunctiva is not injected.      Pupils: Pupils are equal, round, and " reactive to light.   Neck:      Thyroid: No thyroid mass.      Trachea: Trachea normal.   Cardiovascular:      Rate and Rhythm: Normal rate and regular rhythm.      Heart sounds: Normal heart sounds. No murmur heard.    No gallop.   Pulmonary:      Effort: Pulmonary effort is normal.      Breath sounds: Normal breath sounds and air entry. No wheezing, rhonchi or rales.   Musculoskeletal:         General: No tenderness or deformity. Normal range of motion.      Cervical back: Full passive range of motion without pain, normal range of motion and neck supple.      Thoracic back: Normal.      Right lower leg: No edema.      Left lower leg: No edema.   Skin:     General: Skin is warm and dry.      Coloration: Skin is not jaundiced.      Findings: No rash.   Neurological:      Mental Status: She is alert and oriented to person, place, and time.      Cranial Nerves: Cranial nerves are intact.      Sensory: Sensation is intact.      Motor: Motor function is intact.      Coordination: Coordination is intact.      Gait: Gait is intact.      Deep Tendon Reflexes: Reflexes are normal and symmetric.   Psychiatric:         Mood and Affect: Mood and affect normal.         Behavior: Behavior normal.         Judgment: Judgment normal.        Result Review :                Assessment and Plan   Diagnoses and all orders for this visit:    1. SVT (supraventricular tachycardia) (HCC) (Primary)  -     atenolol (Tenormin) 25 MG tablet; Take 0.5 tablets by mouth Daily for 90 days.  Dispense: 45 tablet; Refill: 1    2. Graves disease      Patient is here today to establish care in our office.  She has a history of SVT and Graves' disease.  She was following with Dr. Baldwin in [place].  However she has recently moved to [place] and does not want to drive back there.  She just had lab work within the last couple months to monitor her TSH.  She states that it was minimally elevated, but stable.  I have requested that she bring that lab  work in so we can review it.  The only thing that she needs today is a refill on her atenolol for her SVT.  We will have patient follow-up in 6 months and do a yearly exam at that time.  Patient verbalizes understanding.       Follow Up   Return in about 6 months (around 12/24/2022) for Recheck.  Patient was given instructions and counseling regarding her condition or for health maintenance advice. Please see specific information pulled into the AVS if appropriate.

## 2022-07-14 NOTE — PROGRESS NOTES
Subjective    Ms. Ahn is 42 y.o. female    Chief Complaint: Right Kidney stone    History of Present Illness  42-year-old female follow-up status post right ESWL and stent placement 3/23/2022 for a 1.5 cm right renal pelvis stone.  Bilateral renal ultrasound done 2022 reviewed by me with the patient shows no stone evident, no hydronephrosis.  She has been doing well without flank pain or hematuria.  Her original CT showed this was her only stone.  She did have a punctate Ruel plaque in the left lower pole in March.  UA today is clear.    I independently visualized and reviewed the patient's prior imaging studies today in clinic and discussed the imaging findings with the patient.      The following portions of the patient's history were reviewed and updated as appropriate: allergies, current medications, past family history, past medical history, past social history, past surgical history and problem list.    Review of Systems      Current Outpatient Medications:   •  atenolol (Tenormin) 25 MG tablet, Take 0.5 tablets by mouth Daily for 90 days., Disp: 45 tablet, Rfl: 1  •  Multiple Vitamins-Minerals (b complex-C-E-zinc) tablet, Take 1 tablet by mouth Daily., Disp: , Rfl:   •  multivitamin with minerals tablet tablet, Take 1 tablet by mouth Daily., Disp: , Rfl:   •  norethindrone (Deblitane) 0.35 MG tablet, Take 1 tablet by mouth Daily., Disp: , Rfl:     Past Medical History:   Diagnosis Date   • Graves disease    • Kidney stone    • SVT (supraventricular tachycardia) (HCC)        Past Surgical History:   Procedure Laterality Date   • APPENDECTOMY     •  SECTION     • ENDOSCOPY     • EXTRACORPOREAL SHOCKWAVE LITHOTRIPSY (ESWL), STENT INSERTION/REMOVAL Right 3/23/2022    Procedure: EXTRACORPOREAL SHOCKWAVE LITHOTRIPSY  RIGHT CYSTO STENT PLACEMENT;  Surgeon: Michele Chavez MD;  [facility];  Service: Urology;  Laterality: Right;   • TUBAL ABDOMINAL LIGATION         Social History  "    Socioeconomic History   • Marital status:    Tobacco Use   • Smoking status: Current Every Day Smoker     Packs/day: 1.00     Years: 13.00     Pack years: 13.00     Types: Cigarettes   • Smokeless tobacco: Never Used   Vaping Use   • Vaping Use: Never used   Substance and Sexual Activity   • Alcohol use: Yes     Comment: recreational   • Drug use: Never   • Sexual activity: Defer       History reviewed. No pertinent family history.    Objective    Temp 97.8 °F (36.6 °C)   Ht 177.8 cm (70\")   Wt 82.6 kg (182 lb)   BMI 26.11 kg/m²     Physical Exam        Results for orders placed or performed in visit on 07/20/22   POC Urinalysis Dipstick, Multipro    Specimen: Urine   Result Value Ref Range    Color Yellow Yellow, Straw, Dark Yellow, [place]    Clarity, UA Clear Clear    Glucose, UA Negative Negative mg/dL    Bilirubin Negative Negative    Ketones, UA Negative Negative    Specific Gravity  1.030 1.005 - 1.030    Blood, UA Negative Negative    pH, Urine 5.5 5.0 - 8.0    Protein, POC Negative Negative mg/dL    Urobilinogen, UA Normal Normal    Nitrite, UA Negative Negative    Leukocytes Negative Negative     Assessment and Plan    Diagnoses and all orders for this visit:    1. History of kidney stones (Primary)  -     POC Urinalysis Dipstick, Multipro      Doing well appears stone free by renal ultrasound.  She will follow-up with me on an as-needed basis.    We discussed recommendations for reducing future risk of stone formation and/or stone enlargement including increasing fluid intake with a goal of 6 L daily to achieve a urinary output of 2 to 2.5 L daily, low oxalate diet, benefits of dietary citrate, reducing purine intake, and no added salt.      This document has been signed by VICTORINA Chavez MD on July 20, 2022 08:21 CDT            "

## 2022-07-15 ENCOUNTER — HOSPITAL ENCOUNTER (OUTPATIENT)
Dept: ULTRASOUND IMAGING | Facility: HOSPITAL | Age: 42
Discharge: HOME | End: 2022-07-15
Admitting: UROLOGY

## 2022-07-15 DIAGNOSIS — Z48.89 POSTOPERATIVE VISIT: ICD-10-CM

## 2022-07-15 PROCEDURE — 76775 US EXAM ABDO BACK WALL LIM: CPT

## 2022-07-20 ENCOUNTER — OFFICE VISIT (OUTPATIENT)
Dept: UROLOGY | Facility: CLINIC | Age: 42
End: 2022-07-20

## 2022-07-20 VITALS — HEIGHT: 70 IN | BODY MASS INDEX: 26.05 KG/M2 | WEIGHT: 182 LBS | TEMPERATURE: 97.8 F

## 2022-07-20 DIAGNOSIS — Z87.442 HISTORY OF KIDNEY STONES: Primary | ICD-10-CM

## 2022-07-20 LAB
BILIRUB BLD-MCNC: NEGATIVE MG/DL
CLARITY, POC: CLEAR
COLOR UR: YELLOW
GLUCOSE UR STRIP-MCNC: NEGATIVE MG/DL
KETONES UR QL: NEGATIVE
LEUKOCYTE EST, POC: NEGATIVE
NITRITE UR-MCNC: NEGATIVE MG/ML
PH UR: 5.5 [PH] (ref 5–8)
PROT UR STRIP-MCNC: NEGATIVE MG/DL
RBC # UR STRIP: NEGATIVE /UL
SP GR UR: 1.03 (ref 1–1.03)
UROBILINOGEN UR QL: NORMAL

## 2022-07-20 PROCEDURE — 99213 OFFICE O/P EST LOW 20 MIN: CPT | Performed by: UROLOGY

## 2022-08-15 DIAGNOSIS — I47.1 SVT (SUPRAVENTRICULAR TACHYCARDIA): ICD-10-CM

## 2022-08-15 RX ORDER — ATENOLOL 25 MG/1
25 TABLET ORAL 2 TIMES DAILY
Qty: 180 TABLET | Refills: 1 | Status: SHIPPED | OUTPATIENT
Start: 2022-08-15 | End: 2022-11-13

## 2022-08-19 ENCOUNTER — TELEPHONE (OUTPATIENT)
Dept: FAMILY MEDICINE CLINIC | Facility: CLINIC | Age: 42
End: 2022-08-19

## 2022-08-19 NOTE — TELEPHONE ENCOUNTER
Caller: Jimy Denise    Relationship: Self    Best call back number: [phone]     What medications are you currently taking:   Current Outpatient Medications on File Prior to Visit   Medication Sig Dispense Refill   • atenolol (Tenormin) 25 MG tablet Take 1 tablet by mouth 2 (Two) Times a Day for 90 days. 180 tablet 1   • Multiple Vitamins-Minerals (b complex-C-E-zinc) tablet Take 1 tablet by mouth Daily.     • multivitamin with minerals tablet tablet Take 1 tablet by mouth Daily.     • norethindrone (Deblitane) 0.35 MG tablet Take 1 tablet by mouth Daily.       No current facility-administered medications on file prior to visit.       Which medication are you concerned about: atenolol (Tenormin) 25 MG tablet    What are your concerns: PATIENT SAYS IT SHOWS TO TAKE ONE TABLET ONCE A DAY, INSTEAD OF HALF A PILL TWICE A DAY.

## 2022-08-19 NOTE — TELEPHONE ENCOUNTER
Patient returned my phone call and states her insurance isn't wanting to cover the medication due to her filling the previous prescription. I called [facility] and informed it is a dose change. Pharmacy states he got it to go through now and is filling the script. Called patient and informed of above. CHARLIE.

## 2022-08-23 ENCOUNTER — LAB (OUTPATIENT)
Dept: LAB | Facility: HOSPITAL | Age: 42
End: 2022-08-23

## 2022-08-23 ENCOUNTER — OFFICE VISIT (OUTPATIENT)
Dept: FAMILY MEDICINE CLINIC | Facility: CLINIC | Age: 42
End: 2022-08-23

## 2022-08-23 ENCOUNTER — TELEPHONE (OUTPATIENT)
Dept: FAMILY MEDICINE CLINIC | Facility: CLINIC | Age: 42
End: 2022-08-23

## 2022-08-23 VITALS
SYSTOLIC BLOOD PRESSURE: 115 MMHG | TEMPERATURE: 98.8 F | DIASTOLIC BLOOD PRESSURE: 76 MMHG | OXYGEN SATURATION: 100 % | HEART RATE: 54 BPM

## 2022-08-23 DIAGNOSIS — Z11.52 ENCOUNTER FOR SCREENING FOR COVID-19: ICD-10-CM

## 2022-08-23 DIAGNOSIS — Z11.52 ENCOUNTER FOR SCREENING FOR COVID-19: Primary | ICD-10-CM

## 2022-08-23 LAB — SARS-COV-2 RNA PNL SPEC NAA+PROBE: NOT DETECTED

## 2022-08-23 PROCEDURE — C9803 HOPD COVID-19 SPEC COLLECT: HCPCS

## 2022-08-23 PROCEDURE — 99212 OFFICE O/P EST SF 10 MIN: CPT | Performed by: NURSE PRACTITIONER

## 2022-08-23 PROCEDURE — 87635 SARS-COV-2 COVID-19 AMP PRB: CPT

## 2022-08-23 NOTE — TELEPHONE ENCOUNTER
----- Message from MELLISSA Ching sent at 8/23/2022 12:17 PM CDT -----  Please let pt know results: covid neg

## 2022-08-23 NOTE — PROGRESS NOTES
"Chief Complaint  Fatigue (Just feeling tired request covid due to child sick)    Subjective    {Problem List  Visit Diagnosis   Encounters  Notes  Medications  Labs  Result Review Imaging  Media :23}    Denise Ahn presents to [facility]  Fatigue   Just feeling tired request covid due to child sick currently.           Objective   Vital Signs:  /76   Pulse 54   Temp 98.8 °F (37.1 °C)   SpO2 100%   Estimated body mass index is 26.11 kg/m² as calculated from the following:    Height as of 7/20/22: 177.8 cm (70\").    Weight as of 7/20/22: 82.6 kg (182 lb).      Physical Exam  Constitutional:       Appearance: Normal appearance. She is well-developed.   HENT:      Head: Normocephalic and atraumatic.      Right Ear: Tympanic membrane, ear canal and external ear normal.      Left Ear: Tympanic membrane, ear canal and external ear normal.      Nose: Nose normal. No septal deviation, nasal tenderness or congestion.      Mouth/Throat:      Lips: Pink. No lesions.      Mouth: Mucous membranes are moist. No oral lesions.      Dentition: Normal dentition.      Pharynx: Oropharynx is clear. No pharyngeal swelling, oropharyngeal exudate or posterior oropharyngeal erythema.   Eyes:      General: Lids are normal. Vision grossly intact. No scleral icterus.        Right eye: No discharge.         Left eye: No discharge.      Extraocular Movements: Extraocular movements intact.      Conjunctiva/sclera: Conjunctivae normal.      Right eye: Right conjunctiva is not injected.      Left eye: Left conjunctiva is not injected.      Pupils: Pupils are equal, round, and reactive to light.   Neck:      Thyroid: No thyroid mass.      Trachea: Trachea normal.   Cardiovascular:      Rate and Rhythm: Normal rate and regular rhythm.      Heart sounds: Normal heart sounds. No murmur heard.    No gallop.   Pulmonary:      Effort: Pulmonary effort is normal.      Breath sounds: Normal breath sounds " and air entry. No wheezing, rhonchi or rales.   Abdominal:      General: There is no distension.      Palpations: Abdomen is soft. There is no mass.      Tenderness: There is no abdominal tenderness. There is no right CVA tenderness, left CVA tenderness, guarding or rebound.   Musculoskeletal:         General: No tenderness or deformity. Normal range of motion.      Cervical back: Full passive range of motion without pain, normal range of motion and neck supple.      Thoracic back: Normal.      Right lower leg: No edema.      Left lower leg: No edema.   Skin:     General: Skin is warm and dry.      Coloration: Skin is not jaundiced.      Findings: No rash.   Neurological:      Mental Status: She is alert and oriented to person, place, and time.      Cranial Nerves: Cranial nerves are intact.      Sensory: Sensation is intact.      Motor: Motor function is intact.      Coordination: Coordination is intact.      Gait: Gait is intact.      Deep Tendon Reflexes: Reflexes are normal and symmetric.   Psychiatric:         Mood and Affect: Mood and affect normal.         Judgment: Judgment normal.        Result Review :           COVID-19,[employer] IN-HOUSE,Nasal Swab No Transport Media 3-4 HR TAT - Swab, Nasal Cavity (08/23/2022 11:22)       Assessment and Plan   Diagnoses and all orders for this visit:    1. Encounter for screening for COVID-19 (Primary)  -     COVID-19,[employer] IN-HOUSE,Nasal Swab No Transport Media 3-4 HR TAT - Swab, Nasal Cavity; Future    Plan:   Covid testing, today-negative result.          Follow Up   Return if symptoms worsen or fail to improve.  Patient was given instructions and counseling regarding her condition or for health maintenance advice. Please see specific information pulled into the AVS if appropriate.

## 2023-04-11 ENCOUNTER — HOSPITAL ENCOUNTER (OUTPATIENT)
Dept: GENERAL RADIOLOGY | Facility: HOSPITAL | Age: 43
Discharge: HOME | End: 2023-04-11
Admitting: NURSE PRACTITIONER
Payer: MEDICAID

## 2023-04-11 ENCOUNTER — TELEPHONE (OUTPATIENT)
Dept: FAMILY MEDICINE CLINIC | Facility: CLINIC | Age: 43
End: 2023-04-11
Payer: MEDICAID

## 2023-04-11 ENCOUNTER — OFFICE VISIT (OUTPATIENT)
Dept: FAMILY MEDICINE CLINIC | Facility: CLINIC | Age: 43
End: 2023-04-11
Payer: MEDICAID

## 2023-04-11 VITALS
BODY MASS INDEX: 28.2 KG/M2 | HEIGHT: 70 IN | HEART RATE: 67 BPM | WEIGHT: 197 LBS | DIASTOLIC BLOOD PRESSURE: 86 MMHG | SYSTOLIC BLOOD PRESSURE: 124 MMHG

## 2023-04-11 DIAGNOSIS — M25.512 ACUTE PAIN OF LEFT SHOULDER: ICD-10-CM

## 2023-04-11 DIAGNOSIS — M25.512 ACUTE PAIN OF LEFT SHOULDER: Primary | ICD-10-CM

## 2023-04-11 PROCEDURE — 99213 OFFICE O/P EST LOW 20 MIN: CPT | Performed by: NURSE PRACTITIONER

## 2023-04-11 PROCEDURE — 73030 X-RAY EXAM OF SHOULDER: CPT

## 2023-04-11 RX ORDER — KETOROLAC TROMETHAMINE 30 MG/ML
60 INJECTION, SOLUTION INTRAMUSCULAR; INTRAVENOUS ONCE
Status: COMPLETED | OUTPATIENT
Start: 2023-04-11 | End: 2023-04-11

## 2023-04-11 RX ORDER — ATENOLOL 25 MG/1
TABLET ORAL
COMMUNITY
Start: 2023-02-16

## 2023-04-11 RX ORDER — TIZANIDINE 4 MG/1
4 TABLET ORAL 3 TIMES DAILY PRN
Qty: 30 TABLET | Refills: 0 | Status: SHIPPED | OUTPATIENT
Start: 2023-04-11

## 2023-04-11 RX ADMIN — KETOROLAC TROMETHAMINE 60 MG: 30 INJECTION, SOLUTION INTRAMUSCULAR; INTRAVENOUS at 14:07

## 2023-04-11 NOTE — PROGRESS NOTES
"Chief Complaint  Shoulder Pain and Arm Pain    Subjective    History of Present Illness      Patient presents to [facility] for   History of Present Illness  Patient is here for left shoulder and arm pain. States the pain is from shoulder down her arm and hand tingles.  States she has been rearranging her son's bedroom picking up heavy things about a week ago. Pain started off and on since then. Last Saturday, laid in bed all day not able to be mobile with that arm.  Patient can lift arm but not past her shoulder level. Pain level is at 7 after taking tylenol and advil. States without medication, pain is at 12.   Arm Pain          Review of Systems    I have reviewed and agree with the HPI information as above.  Kamryn Clemente, APRN     Objective   Vital Signs:   /86   Pulse 67   Ht 177.8 cm (70\")   Wt 89.4 kg (197 lb)   BMI 28.27 kg/m²     BMI is >= 25 and <30. (Overweight) The following options were offered after discussion;: weight loss educational material (shared in after visit summary), exercise counseling/recommendations and nutrition counseling/recommendations      Physical Exam  Vitals and nursing note reviewed.   Constitutional:       Appearance: Normal appearance. She is well-developed.   HENT:      Head: Normocephalic and atraumatic.      Right Ear: Tympanic membrane, ear canal and external ear normal.      Left Ear: Tympanic membrane, ear canal and external ear normal.      Nose: Nose normal. No septal deviation, nasal tenderness or congestion.      Mouth/Throat:      Lips: Pink. No lesions.      Mouth: Mucous membranes are moist. No oral lesions.      Dentition: Normal dentition.      Pharynx: Oropharynx is clear. No pharyngeal swelling, oropharyngeal exudate or posterior oropharyngeal erythema.   Eyes:      General: Lids are normal. Vision grossly intact. No scleral icterus.        Right eye: No discharge.         Left eye: No discharge.      Extraocular " Movements: Extraocular movements intact.      Conjunctiva/sclera: Conjunctivae normal.      Right eye: Right conjunctiva is not injected.      Left eye: Left conjunctiva is not injected.      Pupils: Pupils are equal, round, and reactive to light.   Neck:      Thyroid: No thyroid mass.      Trachea: Trachea normal.   Cardiovascular:      Rate and Rhythm: Normal rate and regular rhythm.      Heart sounds: Normal heart sounds. No murmur heard.    No gallop.   Pulmonary:      Effort: Pulmonary effort is normal.      Breath sounds: Normal breath sounds and air entry. No wheezing, rhonchi or rales.   Musculoskeletal:         General: No deformity.      Left shoulder: Tenderness and bony tenderness present. Decreased range of motion.      Cervical back: Full passive range of motion without pain, normal range of motion and neck supple.      Thoracic back: Normal.      Right lower leg: No edema.      Left lower leg: No edema.   Skin:     General: Skin is warm and dry.      Coloration: Skin is not jaundiced.      Findings: No rash.   Neurological:      Mental Status: She is alert and oriented to person, place, and time.      Sensory: Sensation is intact.      Motor: Motor function is intact.      Coordination: Coordination is intact.      Gait: Gait is intact.      Deep Tendon Reflexes: Reflexes are normal and symmetric.   Psychiatric:         Mood and Affect: Mood and affect normal.         Behavior: Behavior normal.         Judgment: Judgment normal.          BARBARA-7:      PHQ-2 Depression Screening  Little interest or pleasure in doing things? 0-->not at all   Feeling down, depressed, or hopeless? 0-->not at all   PHQ-2 Total Score 0     PHQ-9 Depression Screening  Little interest or pleasure in doing things? 0-->not at all   Feeling down, depressed, or hopeless? 0-->not at all   Trouble falling or staying asleep, or sleeping too much? 0-->not at all   Feeling tired or having little energy? 0-->not at all   Poor appetite or  overeating? 0-->not at all   Feeling bad about yourself - or that you are a failure or have let yourself or your family down? 0-->not at all   Trouble concentrating on things, such as reading the newspaper or watching television? 0-->not at all   Moving or speaking so slowly that other people could have noticed? Or the opposite - being so fidgety or restless that you have been moving around a lot more than usual? 0-->not at all   Thoughts that you would be better off dead, or of hurting yourself in some way? 0-->not at all   PHQ-9 Total Score 0   If you checked off any problems, how difficult have these problems made it for you to do your work, take care of things at home, or get along with other people? not difficult at all      Result Review  Data Reviewed:                   Assessment and Plan      Diagnoses and all orders for this visit:    1. Acute pain of left shoulder (Primary)  -     XR Shoulder 2+ View Left; Future  -     ketorolac (TORADOL) injection 60 mg  -     tiZANidine (ZANAFLEX) 4 MG tablet; Take 1 tablet by mouth 3 (Three) Times a Day As Needed for Muscle Spasms.  Dispense: 30 tablet; Refill: 0      Presents today with left shoulder pain.  She states that during spring break she was doing a lot of spring cleaning, yard work, and rearranging furniture.  She states that her left shoulder up to her neck radiating down to her left elbow started to hurt.  She states that this hurts more than a typical muscle strain or muscle spasm.  She states that it was hurting so bad that this weekend she just had to lay in bed and do nothing.  There is tingling and numbness going down the left arm to her elbow.  She does have muscle tenderness on palpation.  She does have full range of motion she is just stiff and has pain.  I suspect that it is just severe muscle strain.  However we will get a shoulder x-ray at this time.  If pain continues after a week and the x-ray is unremarkable we will consider an MRI.  We will  treat patient with a Toradol shot and Zanaflex as needed for muscle spasm.  I offered a steroid however the patient has a history of SVT and she states that steroids cause her SVT to occur.      Follow Up   Return if symptoms worsen or fail to improve.  Patient was given instructions and counseling regarding her condition or for health maintenance advice. Please see specific information pulled into the AVS if appropriate.

## 2023-05-19 ENCOUNTER — OFFICE VISIT (OUTPATIENT)
Dept: FAMILY MEDICINE CLINIC | Facility: CLINIC | Age: 43
End: 2023-05-19
Payer: MEDICAID

## 2023-05-19 ENCOUNTER — LAB (OUTPATIENT)
Dept: LAB | Facility: HOSPITAL | Age: 43
End: 2023-05-19
Payer: MEDICAID

## 2023-05-19 VITALS
WEIGHT: 199 LBS | DIASTOLIC BLOOD PRESSURE: 91 MMHG | BODY MASS INDEX: 28.49 KG/M2 | SYSTOLIC BLOOD PRESSURE: 146 MMHG | HEART RATE: 79 BPM | HEIGHT: 70 IN | OXYGEN SATURATION: 99 %

## 2023-05-19 DIAGNOSIS — E55.9 VITAMIN D DEFICIENCY: ICD-10-CM

## 2023-05-19 DIAGNOSIS — L30.9 ECZEMA, UNSPECIFIED TYPE: ICD-10-CM

## 2023-05-19 DIAGNOSIS — Z11.59 ENCOUNTER FOR HEPATITIS C SCREENING TEST FOR LOW RISK PATIENT: ICD-10-CM

## 2023-05-19 DIAGNOSIS — Z00.00 WELLNESS EXAMINATION: ICD-10-CM

## 2023-05-19 DIAGNOSIS — R23.2 HOT FLASHES: ICD-10-CM

## 2023-05-19 DIAGNOSIS — Z12.31 ENCOUNTER FOR SCREENING MAMMOGRAM FOR MALIGNANT NEOPLASM OF BREAST: ICD-10-CM

## 2023-05-19 DIAGNOSIS — R63.5 WEIGHT GAIN: ICD-10-CM

## 2023-05-19 DIAGNOSIS — Z00.00 WELLNESS EXAMINATION: Primary | ICD-10-CM

## 2023-05-19 DIAGNOSIS — K21.9 GASTROESOPHAGEAL REFLUX DISEASE, UNSPECIFIED WHETHER ESOPHAGITIS PRESENT: ICD-10-CM

## 2023-05-19 LAB
ALBUMIN SERPL-MCNC: 4.4 G/DL (ref 3.5–5)
ALBUMIN/GLOB SERPL: 1.4 G/DL (ref 1.1–2.5)
ALP SERPL-CCNC: 58 U/L (ref 24–120)
ALT SERPL W P-5'-P-CCNC: 25 U/L (ref 0–35)
ANION GAP SERPL CALCULATED.3IONS-SCNC: 7 MMOL/L (ref 4–13)
AST SERPL-CCNC: 26 U/L (ref 7–45)
AUTO MIXED CELLS #: 0.6 10*3/MM3 (ref 0.1–2.6)
AUTO MIXED CELLS %: 6.6 % (ref 0.1–24)
BACTERIA UR QL AUTO: ABNORMAL /HPF
BILIRUB SERPL-MCNC: 0.3 MG/DL (ref 0.1–1)
BILIRUB UR QL STRIP: NEGATIVE
BUN SERPL-MCNC: 12 MG/DL (ref 5–21)
BUN/CREAT SERPL: 15
CALCIUM SPEC-SCNC: 8.9 MG/DL (ref 8.4–10.4)
CHLORIDE SERPL-SCNC: 105 MMOL/L (ref 98–110)
CHOLEST SERPL-MCNC: 193 MG/DL (ref 130–200)
CLARITY UR: CLEAR
CO2 SERPL-SCNC: 29 MMOL/L (ref 24–31)
COLOR UR: YELLOW
CREAT SERPL-MCNC: 0.8 MG/DL (ref 0.5–1.4)
EGFRCR SERPLBLD CKD-EPI 2021: 95.7 ML/MIN/1.73
ERYTHROCYTE [DISTWIDTH] IN BLOOD BY AUTOMATED COUNT: 12.9 % (ref 12.3–15.4)
GLOBULIN UR ELPH-MCNC: 3.2 GM/DL
GLUCOSE SERPL-MCNC: 112 MG/DL (ref 70–100)
GLUCOSE UR STRIP-MCNC: NEGATIVE MG/DL
HCT VFR BLD AUTO: 41.5 % (ref 34–46.6)
HDLC SERPL-MCNC: 48 MG/DL
HGB BLD-MCNC: 14 G/DL (ref 12–15.9)
HGB UR QL STRIP.AUTO: NEGATIVE
HYALINE CASTS UR QL AUTO: ABNORMAL /LPF
KETONES UR QL STRIP: NEGATIVE
LDLC SERPL CALC-MCNC: 45 MG/DL (ref 0–99)
LDLC/HDLC SERPL: 0.08 {RATIO}
LEUKOCYTE ESTERASE UR QL STRIP.AUTO: ABNORMAL
LYMPHOCYTES # BLD AUTO: 2.7 10*3/MM3 (ref 0.7–3.1)
LYMPHOCYTES NFR BLD AUTO: 28.8 % (ref 19.6–45.3)
MCH RBC QN AUTO: 31.5 PG (ref 26.6–33)
MCHC RBC AUTO-ENTMCNC: 33.7 G/DL (ref 31.5–35.7)
MCV RBC AUTO: 93.3 FL (ref 79–97)
NEUTROPHILS NFR BLD AUTO: 6.2 10*3/MM3 (ref 1.7–7)
NEUTROPHILS NFR BLD AUTO: 64.6 % (ref 42.7–76)
NITRITE UR QL STRIP: NEGATIVE
PH UR STRIP.AUTO: 6.5 [PH] (ref 5–8)
PLATELET # BLD AUTO: 194 10*3/MM3 (ref 140–450)
PMV BLD AUTO: 9 FL (ref 6–12)
POTASSIUM SERPL-SCNC: 4.2 MMOL/L (ref 3.5–5.3)
PROT SERPL-MCNC: 7.6 G/DL (ref 6.3–8.7)
PROT UR QL STRIP: NEGATIVE
RBC # BLD AUTO: 4.45 10*6/MM3 (ref 3.77–5.28)
RBC # UR STRIP: ABNORMAL /HPF
REF LAB TEST METHOD: ABNORMAL
SODIUM SERPL-SCNC: 141 MMOL/L (ref 135–145)
SP GR UR STRIP: <=1.005 (ref 1–1.03)
SQUAMOUS #/AREA URNS HPF: ABNORMAL /HPF
TRIGL SERPL-MCNC: 705 MG/DL (ref 0–149)
UROBILINOGEN UR QL STRIP: ABNORMAL
VLDLC SERPL-MCNC: 100 MG/DL (ref 5–40)
WBC # UR STRIP: ABNORMAL /HPF
WBC NRBC COR # BLD: 9.5 10*3/MM3 (ref 3.4–10.8)

## 2023-05-19 PROCEDURE — 84443 ASSAY THYROID STIM HORMONE: CPT

## 2023-05-19 PROCEDURE — 87088 URINE BACTERIA CULTURE: CPT

## 2023-05-19 PROCEDURE — 84403 ASSAY OF TOTAL TESTOSTERONE: CPT

## 2023-05-19 PROCEDURE — 85025 COMPLETE CBC W/AUTO DIFF WBC: CPT

## 2023-05-19 PROCEDURE — 86803 HEPATITIS C AB TEST: CPT

## 2023-05-19 PROCEDURE — 1160F RVW MEDS BY RX/DR IN RCRD: CPT | Performed by: NURSE PRACTITIONER

## 2023-05-19 PROCEDURE — 87186 SC STD MICRODIL/AGAR DIL: CPT

## 2023-05-19 PROCEDURE — 82670 ASSAY OF TOTAL ESTRADIOL: CPT

## 2023-05-19 PROCEDURE — 87086 URINE CULTURE/COLONY COUNT: CPT

## 2023-05-19 PROCEDURE — 1159F MED LIST DOCD IN RCRD: CPT | Performed by: NURSE PRACTITIONER

## 2023-05-19 PROCEDURE — 81001 URINALYSIS AUTO W/SCOPE: CPT

## 2023-05-19 PROCEDURE — 80061 LIPID PANEL: CPT

## 2023-05-19 PROCEDURE — 36415 COLL VENOUS BLD VENIPUNCTURE: CPT

## 2023-05-19 PROCEDURE — 84144 ASSAY OF PROGESTERONE: CPT

## 2023-05-19 PROCEDURE — 83002 ASSAY OF GONADOTROPIN (LH): CPT

## 2023-05-19 PROCEDURE — 99396 PREV VISIT EST AGE 40-64: CPT | Performed by: NURSE PRACTITIONER

## 2023-05-19 PROCEDURE — 80053 COMPREHEN METABOLIC PANEL: CPT

## 2023-05-19 PROCEDURE — 82306 VITAMIN D 25 HYDROXY: CPT

## 2023-05-19 PROCEDURE — 83001 ASSAY OF GONADOTROPIN (FSH): CPT

## 2023-05-19 PROCEDURE — 82626 DEHYDROEPIANDROSTERONE: CPT

## 2023-05-19 RX ORDER — PANTOPRAZOLE SODIUM 40 MG/1
40 TABLET, DELAYED RELEASE ORAL DAILY
Qty: 30 TABLET | Refills: 1 | Status: SHIPPED | OUTPATIENT
Start: 2023-05-19

## 2023-05-19 NOTE — PROGRESS NOTES
"Chief Complaint  Annual Exam and Weight Gain    Subjective    History of Present Illness      Patient presents to [facility] for   History of Present Illness  Pt is here today for Annual Exam and to discuss weight gain that has been occurring over the last year. Pt has increased exercise and began dieting (including cutting bread and pasta pout of diet). Eczema has also worsened.  Pt believes it may be related to her TSH levels.     Review of Systems    I have reviewed and agree with the HPI information as above.  Kamryn Clemente, APRN     Objective   Vital Signs:   /91   Pulse 79   Ht 177.8 cm (70\")   Wt 90.3 kg (199 lb)   SpO2 99%   BMI 28.55 kg/m²     BMI is >= 25 and <30. (Overweight) The following options were offered after discussion;: weight loss educational material (shared in after visit summary), exercise counseling/recommendations, and nutrition counseling/recommendations      Physical Exam  Vitals and nursing note reviewed.   Constitutional:       Appearance: Normal appearance. She is well-developed.   HENT:      Head: Normocephalic and atraumatic.      Right Ear: Tympanic membrane, ear canal and external ear normal.      Left Ear: Tympanic membrane, ear canal and external ear normal.      Nose: Nose normal. No septal deviation, nasal tenderness or congestion.      Mouth/Throat:      Lips: Pink. No lesions.      Mouth: Mucous membranes are moist. No oral lesions.      Dentition: Normal dentition.      Pharynx: Oropharynx is clear. No pharyngeal swelling, oropharyngeal exudate or posterior oropharyngeal erythema.   Eyes:      General: Lids are normal. Vision grossly intact. No scleral icterus.        Right eye: No discharge.         Left eye: No discharge.      Extraocular Movements: Extraocular movements intact.      Conjunctiva/sclera: Conjunctivae normal.      Right eye: Right conjunctiva is not injected.      Left eye: Left conjunctiva is not injected.    "   Pupils: Pupils are equal, round, and reactive to light.   Neck:      Thyroid: No thyroid mass.      Trachea: Trachea normal.   Cardiovascular:      Rate and Rhythm: Normal rate and regular rhythm.      Heart sounds: Normal heart sounds. No murmur heard.    No gallop.   Pulmonary:      Effort: Pulmonary effort is normal.      Breath sounds: Normal breath sounds and air entry. No wheezing, rhonchi or rales.   Musculoskeletal:         General: No tenderness or deformity. Normal range of motion.      Cervical back: Full passive range of motion without pain, normal range of motion and neck supple.      Thoracic back: Normal.      Right lower leg: No edema.      Left lower leg: No edema.   Skin:     General: Skin is warm and dry.      Coloration: Skin is not jaundiced.      Findings: No rash.   Neurological:      Mental Status: She is alert and oriented to person, place, and time.      Sensory: Sensation is intact.      Motor: Motor function is intact.      Coordination: Coordination is intact.      Gait: Gait is intact.      Deep Tendon Reflexes: Reflexes are normal and symmetric.   Psychiatric:         Mood and Affect: Mood and affect normal.         Behavior: Behavior normal.         Judgment: Judgment normal.        BARBARA-7: Over the last two weeks, how often have you been bothered by the following problems?  Feeling nervous, anxious or on edge: Nearly every day  Not being able to stop or control worrying: Nearly every day  Worrying too much about different things: Nearly every day  Trouble Relaxing: Nearly every day  Being so restless that it is hard to sit still: Nearly every day  Becoming easily annoyed or irritable: Not at all  Feeling afraid as if something awful might happen: Nearly every day  BARBARA 7 Total Score: 18  If you checked any problems, how difficult have these problems made it for you to do your work, take care of things at home, or get along with other people: Very difficult    PHQ-2 Depression  Screening  Little interest or pleasure in doing things? 0-->not at all   Feeling down, depressed, or hopeless? 1-->several days   PHQ-2 Total Score 1     PHQ-9 Depression Screening  Little interest or pleasure in doing things? 0-->not at all   Feeling down, depressed, or hopeless? 1-->several days   Trouble falling or staying asleep, or sleeping too much?     Feeling tired or having little energy?     Poor appetite or overeating?     Feeling bad about yourself - or that you are a failure or have let yourself or your family down?     Trouble concentrating on things, such as reading the newspaper or watching television?     Moving or speaking so slowly that other people could have noticed? Or the opposite - being so fidgety or restless that you have been moving around a lot more than usual?     Thoughts that you would be better off dead, or of hurting yourself in some way?     PHQ-9 Total Score 1   If you checked off any problems, how difficult have these problems made it for you to do your work, take care of things at home, or get along with other people?        Result Review  Data Reviewed:                   Assessment and Plan      Diagnoses and all orders for this visit:    1. Wellness examination (Primary)  -     CBC Auto Differential; Future  -     Comprehensive Metabolic Panel; Future  -     Lipid Panel; Future  -     Vitamin D,25-Hydroxy; Future  -     Urinalysis With Culture If Indicated - Urine, Clean Catch; Future  -     TSH; Future  -     Hepatitis C antibody; Future    2. Encounter for hepatitis C screening test for low risk patient  -     Hepatitis C antibody; Future    3. Vitamin D deficiency  -     Vitamin D,25-Hydroxy; Future    4. Hot flashes  -     DHEA; Future  -     Estradiol; Future  -     FSH & LH; Future  -     Progesterone; Future  -     Testosterone; Future    5. Weight gain    6. Gastroesophageal reflux disease, unspecified whether esophagitis present  -     pantoprazole (Protonix) 40 MG EC  tablet; Take 1 tablet by mouth Daily.  Dispense: 30 tablet; Refill: 1    7. Encounter for screening mammogram for malignant neoplasm of breast  -     Mammo Screening Bilateral With CAD; Future    8. Eczema, unspecified type  -     triamcinolone (KENALOG) 0.1 % ointment; Apply 1 application topically to the appropriate area as directed 2 (Two) Times a Day.  Dispense: 80 g; Refill: 1    Today for a wellness exam, labs, and screenings.  She is also having hot flashes and weight gain.  She states that she has gained 20 pounds in less than a year.  She is also having increased heartburn.  She has been taking over-the-counter Tums and it does help relieve the pain.  Plan   1. will order labs today including hormone labs  2.  Discussed different options for weight loss.  Due to her history of SVT, stimulants are out of the question.  Patient is wanting to get lab work back before starting any other medication  3.  Patient on tonics 40 for 6 to 8 weeks.  4.  Patient is having an outbreak of eczema on her hands and feet.  Will provide patient with Kenalog cream  5.  We will order a mammogram screening at this time  6.  Patient states that she is up-to-date on her Pap smear she states that she had one last year        Follow Up   Return in about 1 year (around 5/19/2024) for Annual physical.  Patient was given instructions and counseling regarding her condition or for health maintenance advice. Please see specific information pulled into the AVS if appropriate.

## 2023-05-20 LAB
25(OH)D3 SERPL-MCNC: 32.8 NG/ML (ref 30–100)
ESTRADIOL SERPL HS-MCNC: 133 PG/ML
FSH SERPL-ACNC: 3.73 MIU/ML
HCV AB SER DONR QL: NORMAL
LH SERPL-ACNC: 7.3 MIU/ML
PROGEST SERPL-MCNC: 6.59 NG/ML
TESTOST SERPL-MCNC: 27.7 NG/DL (ref 8.4–48.1)
TSH SERPL DL<=0.05 MIU/L-ACNC: 2.09 UIU/ML (ref 0.27–4.2)

## 2023-05-21 LAB — BACTERIA SPEC AEROBE CULT: ABNORMAL

## 2023-05-22 ENCOUNTER — TELEPHONE (OUTPATIENT)
Dept: FAMILY MEDICINE CLINIC | Facility: CLINIC | Age: 43
End: 2023-05-22
Payer: MEDICAID

## 2023-05-22 DIAGNOSIS — E78.1 HYPERTRIGLYCERIDEMIA: ICD-10-CM

## 2023-05-22 DIAGNOSIS — N30.00 ACUTE CYSTITIS WITHOUT HEMATURIA: Primary | ICD-10-CM

## 2023-05-22 RX ORDER — NITROFURANTOIN 25; 75 MG/1; MG/1
100 CAPSULE ORAL 2 TIMES DAILY
Qty: 14 CAPSULE | Refills: 0 | Status: SHIPPED | OUTPATIENT
Start: 2023-05-22

## 2023-05-22 RX ORDER — FENOFIBRATE 145 MG/1
145 TABLET, COATED ORAL DAILY
Qty: 90 TABLET | Refills: 1 | Status: SHIPPED | OUTPATIENT
Start: 2023-05-22

## 2023-05-22 NOTE — TELEPHONE ENCOUNTER
----- Message from MELLISSA Dietrich sent at 5/22/2023  8:51 AM CDT -----  Echoli noted in urine. Will send in an abx  Total cholesterol is fine, but trigs are very very high. Will start on a fenofibrate. I would also recommended starting a daily baby ASA  CBC good  Cmp good  TSH normal  Vitamin d is low normal- take 5000 units OTC daily  Fax hormones to [facility] for suggestions     Recheck labs in 6 months

## 2023-05-22 NOTE — TELEPHONE ENCOUNTER
Called patient and informed of Echoli noted in urine. Will send in an abx  Total cholesterol is fine, but trigs are very very high. Will start on a fenofibrate. I would also recommended starting a daily baby ASA  CBC good  Cmp good  TSH normal  Vitamin d is low normal- take 5000 units OTC daily  Fax hormones to [facility] for suggestions      Recheck labs in 6 months. VU. Labs faxed.

## 2023-05-22 NOTE — PROGRESS NOTES
Echoli noted in urine. Will send in an abx  Total cholesterol is fine, but trigs are very very high. Will start on a fenofibrate. I would also recommended starting a daily baby ASA  CBC good  Cmp good  TSH normal  Vitamin d is low normal- take 5000 units OTC daily  Fax hormones to [facility] for suggestions     Recheck labs in 6 months

## 2023-05-25 LAB — DHEA SERPL-MCNC: 386 NG/DL (ref 31–701)

## 2023-06-09 ENCOUNTER — HOSPITAL ENCOUNTER (OUTPATIENT)
Dept: MAMMOGRAPHY | Facility: HOSPITAL | Age: 43
Discharge: HOME | End: 2023-06-09
Admitting: NURSE PRACTITIONER
Payer: MEDICAID

## 2023-06-09 DIAGNOSIS — Z12.31 ENCOUNTER FOR SCREENING MAMMOGRAM FOR MALIGNANT NEOPLASM OF BREAST: ICD-10-CM

## 2023-06-09 PROCEDURE — 77063 BREAST TOMOSYNTHESIS BI: CPT

## 2023-06-09 PROCEDURE — 77067 SCR MAMMO BI INCL CAD: CPT

## 2023-06-12 ENCOUNTER — TELEPHONE (OUTPATIENT)
Dept: FAMILY MEDICINE CLINIC | Facility: CLINIC | Age: 43
End: 2023-06-12
Payer: MEDICAID

## 2023-06-12 NOTE — TELEPHONE ENCOUNTER
Called patient and informed mammogram results of cystic breast noted, but not worrisome for malignancy. Continue yearly screens. CHARLIE.

## 2023-06-12 NOTE — TELEPHONE ENCOUNTER
----- Message from MELLISSA Dietrich sent at 6/12/2023  3:46 PM CDT -----  Cystic breast noted, but not worrisome for malignancy  Continue yearly screens

## 2023-09-20 RX ORDER — ATENOLOL 25 MG/1
TABLET ORAL
Qty: 180 TABLET | Refills: 3 | OUTPATIENT
Start: 2023-09-20

## 2023-10-10 ENCOUNTER — LAB (OUTPATIENT)
Dept: LAB | Facility: HOSPITAL | Age: 43
End: 2023-10-10
Payer: MEDICAID

## 2023-10-10 ENCOUNTER — TELEPHONE (OUTPATIENT)
Dept: FAMILY MEDICINE CLINIC | Facility: CLINIC | Age: 43
End: 2023-10-10

## 2023-10-10 ENCOUNTER — OFFICE VISIT (OUTPATIENT)
Dept: FAMILY MEDICINE CLINIC | Facility: CLINIC | Age: 43
End: 2023-10-10
Payer: MEDICAID

## 2023-10-10 VITALS
HEART RATE: 58 BPM | OXYGEN SATURATION: 99 % | TEMPERATURE: 98.6 F | RESPIRATION RATE: 20 BRPM | SYSTOLIC BLOOD PRESSURE: 125 MMHG | BODY MASS INDEX: 30.49 KG/M2 | WEIGHT: 213 LBS | DIASTOLIC BLOOD PRESSURE: 83 MMHG | HEIGHT: 70 IN

## 2023-10-10 DIAGNOSIS — E78.1 HYPERTRIGLYCERIDEMIA: ICD-10-CM

## 2023-10-10 DIAGNOSIS — E05.00 GRAVES DISEASE: ICD-10-CM

## 2023-10-10 DIAGNOSIS — I47.10 SVT (SUPRAVENTRICULAR TACHYCARDIA): Primary | ICD-10-CM

## 2023-10-10 DIAGNOSIS — R60.0 LOCALIZED EDEMA: Primary | ICD-10-CM

## 2023-10-10 DIAGNOSIS — E66.9 CLASS 1 OBESITY WITH BODY MASS INDEX (BMI) OF 30.0 TO 30.9 IN ADULT, UNSPECIFIED OBESITY TYPE, UNSPECIFIED WHETHER SERIOUS COMORBIDITY PRESENT: ICD-10-CM

## 2023-10-10 DIAGNOSIS — I47.10 SVT (SUPRAVENTRICULAR TACHYCARDIA): ICD-10-CM

## 2023-10-10 DIAGNOSIS — R63.5 WEIGHT GAIN: Primary | ICD-10-CM

## 2023-10-10 DIAGNOSIS — R60.0 LOCALIZED EDEMA: ICD-10-CM

## 2023-10-10 DIAGNOSIS — R53.83 OTHER FATIGUE: ICD-10-CM

## 2023-10-10 DIAGNOSIS — N92.6 IRREGULAR PERIODS: ICD-10-CM

## 2023-10-10 DIAGNOSIS — R63.5 WEIGHT GAIN: ICD-10-CM

## 2023-10-10 LAB
ALBUMIN SERPL-MCNC: 4.4 G/DL (ref 3.5–5)
ALBUMIN/GLOB SERPL: 1.3 G/DL (ref 1.1–2.5)
ALP SERPL-CCNC: 51 U/L (ref 24–120)
ALT SERPL W P-5'-P-CCNC: 41 U/L (ref 0–35)
ANION GAP SERPL CALCULATED.3IONS-SCNC: 7 MMOL/L (ref 4–13)
AST SERPL-CCNC: 40 U/L (ref 7–45)
AUTO MIXED CELLS #: 0.6 10*3/MM3 (ref 0.1–2.6)
AUTO MIXED CELLS %: 6.9 % (ref 0.1–24)
BILIRUB SERPL-MCNC: 0.5 MG/DL (ref 0.1–1)
BUN SERPL-MCNC: 12 MG/DL (ref 5–21)
BUN/CREAT SERPL: 17.1
CALCIUM SPEC-SCNC: 9.1 MG/DL (ref 8.4–10.4)
CHLORIDE SERPL-SCNC: 104 MMOL/L (ref 98–110)
CO2 SERPL-SCNC: 28 MMOL/L (ref 24–31)
CREAT SERPL-MCNC: 0.7 MG/DL (ref 0.5–1.4)
EGFRCR SERPLBLD CKD-EPI 2021: 111.6 ML/MIN/1.73
ERYTHROCYTE [DISTWIDTH] IN BLOOD BY AUTOMATED COUNT: 14.1 % (ref 12.3–15.4)
GLOBULIN UR ELPH-MCNC: 3.3 GM/DL
GLUCOSE SERPL-MCNC: 101 MG/DL (ref 70–100)
HCT VFR BLD AUTO: 41.4 % (ref 34–46.6)
HGB BLD-MCNC: 13.9 G/DL (ref 12–15.9)
LYMPHOCYTES # BLD AUTO: 2.2 10*3/MM3 (ref 0.7–3.1)
LYMPHOCYTES NFR BLD AUTO: 24.1 % (ref 19.6–45.3)
MCH RBC QN AUTO: 32 PG (ref 26.6–33)
MCHC RBC AUTO-ENTMCNC: 33.6 G/DL (ref 31.5–35.7)
MCV RBC AUTO: 95.4 FL (ref 79–97)
NEUTROPHILS NFR BLD AUTO: 6.5 10*3/MM3 (ref 1.7–7)
NEUTROPHILS NFR BLD AUTO: 69 % (ref 42.7–76)
PLATELET # BLD AUTO: 203 10*3/MM3 (ref 140–450)
PMV BLD AUTO: 8.9 FL (ref 6–12)
POTASSIUM SERPL-SCNC: 3.9 MMOL/L (ref 3.5–5.3)
PROT SERPL-MCNC: 7.7 G/DL (ref 6.3–8.7)
RBC # BLD AUTO: 4.34 10*6/MM3 (ref 3.77–5.28)
SODIUM SERPL-SCNC: 139 MMOL/L (ref 135–145)
TRIGL SERPL-MCNC: 293 MG/DL (ref 0–149)
WBC NRBC COR # BLD: 9.3 10*3/MM3 (ref 3.4–10.8)

## 2023-10-10 PROCEDURE — 80053 COMPREHEN METABOLIC PANEL: CPT

## 2023-10-10 PROCEDURE — 84481 FREE ASSAY (FT-3): CPT

## 2023-10-10 PROCEDURE — 84478 ASSAY OF TRIGLYCERIDES: CPT

## 2023-10-10 PROCEDURE — 36415 COLL VENOUS BLD VENIPUNCTURE: CPT

## 2023-10-10 PROCEDURE — 1159F MED LIST DOCD IN RCRD: CPT | Performed by: NURSE PRACTITIONER

## 2023-10-10 PROCEDURE — 84443 ASSAY THYROID STIM HORMONE: CPT

## 2023-10-10 PROCEDURE — 84439 ASSAY OF FREE THYROXINE: CPT

## 2023-10-10 PROCEDURE — 82607 VITAMIN B-12: CPT

## 2023-10-10 PROCEDURE — 99214 OFFICE O/P EST MOD 30 MIN: CPT | Performed by: NURSE PRACTITIONER

## 2023-10-10 PROCEDURE — 85025 COMPLETE CBC W/AUTO DIFF WBC: CPT

## 2023-10-10 PROCEDURE — 1160F RVW MEDS BY RX/DR IN RCRD: CPT | Performed by: NURSE PRACTITIONER

## 2023-10-10 RX ORDER — HYDROCHLOROTHIAZIDE 12.5 MG/1
12.5 CAPSULE, GELATIN COATED ORAL EVERY MORNING
Qty: 30 CAPSULE | Refills: 1 | Status: SHIPPED | OUTPATIENT
Start: 2023-10-10

## 2023-10-10 NOTE — PROGRESS NOTES
"Chief Complaint  weight gain, Joint Swelling, and Palpitations    Subjective    History of Present Illness      Patient presents to [facility] for   History of Present Illness  Pt is here today to talk about weight gain, ankle swelling and pt heart palpations worsening over the past year. Weight gain of approx 15 pounds, intermittent ankle swelling, fatigue. She has graves disease and wonders if this is causing all her problems.        Review of Systems    I have reviewed and agree with the HPI and ROS information as above.  MELLISSA Ching     Objective   Vital Signs:   /83   Pulse 58   Temp 98.6 øF (37 øC)   Resp 20   Ht 177.8 cm (70\")   Wt 96.6 kg (213 lb)   SpO2 99%   BMI 30.56 kg/mý            Physical Exam  Constitutional:       Appearance: She is well-developed. She is obese.   HENT:      Head: Normocephalic and atraumatic.      Right Ear: Tympanic membrane, ear canal and external ear normal.      Left Ear: Tympanic membrane, ear canal and external ear normal.      Nose: Nose normal. No septal deviation, nasal tenderness or congestion.      Mouth/Throat:      Lips: Pink. No lesions.      Mouth: Mucous membranes are moist. No oral lesions.      Dentition: Normal dentition.      Pharynx: Oropharynx is clear. No pharyngeal swelling, oropharyngeal exudate or posterior oropharyngeal erythema.   Eyes:      General: Lids are normal. Vision grossly intact. No scleral icterus.        Right eye: No discharge.         Left eye: No discharge.      Extraocular Movements: Extraocular movements intact.      Conjunctiva/sclera: Conjunctivae normal.      Right eye: Right conjunctiva is not injected.      Left eye: Left conjunctiva is not injected.      Pupils: Pupils are equal, round, and reactive to light.   Neck:      Thyroid: No thyroid mass.      Trachea: Trachea normal.   Cardiovascular:      Rate and Rhythm: Regular rhythm. Bradycardia present.      Heart sounds: Normal " heart sounds. No murmur heard.     No gallop.   Pulmonary:      Effort: Pulmonary effort is normal.      Breath sounds: Normal breath sounds and air entry. No wheezing, rhonchi or rales.   Abdominal:      General: There is no distension.      Palpations: Abdomen is soft. There is no mass.      Tenderness: There is no abdominal tenderness. There is no right CVA tenderness, left CVA tenderness, guarding or rebound.   Musculoskeletal:         General: No tenderness or deformity. Normal range of motion.      Cervical back: Full passive range of motion without pain, normal range of motion and neck supple.      Thoracic back: Normal.      Right lower leg: No edema.      Left lower leg: No edema.   Skin:     General: Skin is warm and dry.      Coloration: Skin is not jaundiced.      Findings: No rash.   Neurological:      Mental Status: She is alert and oriented to person, place, and time.      Sensory: Sensation is intact.      Motor: Motor function is intact.      Coordination: Coordination is intact.      Gait: Gait is intact.      Deep Tendon Reflexes: Reflexes are normal and symmetric.   Psychiatric:         Mood and Affect: Mood and affect normal.         Judgment: Judgment normal.          BARBARA-7: Over the last two weeks, how often have you been bothered by the following problems?  Feeling nervous, anxious or on edge: Several days  Not being able to stop or control worrying: Not at all  Worrying too much about different things: Not at all  Trouble Relaxing: Not at all  Being so restless that it is hard to sit still: Not at all  Becoming easily annoyed or irritable: Not at all  Feeling afraid as if something awful might happen: Several days  BARBARA 7 Total Score: 2  If you checked any problems, how difficult have these problems made it for you to do your work, take care of things at home, or get along with other people: Somewhat difficult    PHQ-2 Depression Screening  Little interest or pleasure in doing things? 0-->not  at all   Feeling down, depressed, or hopeless? 0-->not at all   PHQ-2 Total Score 0     PHQ-9 Depression Screening  Little interest or pleasure in doing things? 0-->not at all   Feeling down, depressed, or hopeless? 0-->not at all   Trouble falling or staying asleep, or sleeping too much?     Feeling tired or having little energy?     Poor appetite or overeating?     Feeling bad about yourself - or that you are a failure or have let yourself or your family down?     Trouble concentrating on things, such as reading the newspaper or watching television?     Moving or speaking so slowly that other people could have noticed? Or the opposite - being so fidgety or restless that you have been moving around a lot more than usual?     Thoughts that you would be better off dead, or of hurting yourself in some way?     PHQ-9 Total Score 0   If you checked off any problems, how difficult have these problems made it for you to do your work, take care of things at home, or get along with other people?        Result Review  Data Reviewed:                   Assessment and Plan      Diagnoses and all orders for this visit:    1. Weight gain (Primary)  -      Thyroid  -     T3, free; Future  -     T4, free; Future  -     TSH; Future  -     CBC w AUTO Differential; Future  -     Comprehensive metabolic panel; Future  -     Vitamin B12; Future  -     metFORMIN (Glucophage) 500 MG tablet; 1 tab PO with evening meal x 2 weeks then increase to 1 tab PO BID with meals  Dispense: 60 tablet; Refill: 2    2. Localized edema  -     hydroCHLOROthiazide (MICROZIDE) 12.5 MG capsule; Take 1 capsule by mouth Every Morning.  Dispense: 30 capsule; Refill: 1    3. Irregular periods  -     metFORMIN (Glucophage) 500 MG tablet; 1 tab PO with evening meal x 2 weeks then increase to 1 tab PO BID with meals  Dispense: 60 tablet; Refill: 2    4. Other fatigue  -     CBC w AUTO Differential; Future  -     Comprehensive metabolic panel; Future  -      "Vitamin B12; Future    5. SVT (supraventricular tachycardia)    6. Graves disease  -     US Thyroid  -     T3, free; Future  -     T4, free; Future  -     TSH; Future    7. Class 1 obesity with body mass index (BMI) of 30.0 to 30.9 in adult, unspecified obesity type, unspecified whether serious comorbidity present    8. Hypertriglyceridemia  -     Triglycerides; Future    Patient presents today with a complicated set of concerns that has been occurring over the past year.  She tells me that she has gained weight over the past year approximately 14 to 15 pounds despite her efforts and very little intake daily, she eats a protein bar usually for breakfast and lunch and then a small dinner and has cut out sodas completely over the past year.  She will have intermittent lower extremity swelling and also hand swelling.  Her periods have been irregular.  She feels fatigued and \"blah \".  She has a history of Graves' disease with likely secondary SVT issues that is treated with atenolol only at this time, she is tolerating half-strength atenolol and has been on this for 6 years.  She is requesting some blood work today and to discuss how to lose weight.  Plan:   Labs today. Call with results.   Start hctz qam for fluid retention.   May consider consult with GYN for irregular cycles, weight gain, fatigue if our efforts are unsuccessful in controlling/helping symptoms.   Continue 1/2 dose atenolol daily and she does monitor home heart rates. Mild bradycardia today.   Thyroid labs today and US.   BMI-we will start metformin. She is not a stimulant candidate, has no appetite concerns to use other suppressants for.   Repeat trig levels and continue fenofibrate for now.         Follow Up   Return in about 1 month (around 11/10/2023).  Patient was given instructions and counseling regarding her condition or for health maintenance advice. Please see specific information pulled into the AVS if appropriate.       "

## 2023-10-10 NOTE — TELEPHONE ENCOUNTER
Caller: Jimy Denise    Relationship: Self    Best call back number: [phone]     Requested Prescriptions:   Requested Prescriptions     Pending Prescriptions Disp Refills    atenolol (TENORMIN) 25 MG tablet          Pharmacy where request should be sent: [facility] [address] [place] [address] [place] - [phone] PH - [phone] FX     Last office visit with prescribing clinician: 10/10/2023   Last telemedicine visit with prescribing clinician: Visit date not found   Next office visit with prescribing clinician: 11/10/2023     Additional details provided by patient: COMPLETELY OUT    Does the patient have less than a 3 day supply:  [x] Yes  [] No        Pérez Bogres III, RegSched Rep   10/10/23 14:40 CDT

## 2023-10-11 LAB
T3FREE SERPL-MCNC: 3.04 PG/ML (ref 2–4.4)
T4 FREE SERPL-MCNC: 0.98 NG/DL (ref 0.93–1.7)
TSH SERPL DL<=0.05 MIU/L-ACNC: 3.05 UIU/ML (ref 0.27–4.2)
VIT B12 BLD-MCNC: 393 PG/ML (ref 211–946)

## 2023-10-11 RX ORDER — ATENOLOL 25 MG/1
25 TABLET ORAL 2 TIMES DAILY
Qty: 60 TABLET | Refills: 1 | Status: SHIPPED | OUTPATIENT
Start: 2023-10-11

## 2023-10-11 NOTE — TELEPHONE ENCOUNTER
Office notes from address that pt is still taking the atenolol. Pt contacted office and spoke to this nurse. Verified pt's name and , informed that we would get it sent in today. Pt vu of above and thanked staff.

## 2023-10-12 ENCOUNTER — TELEPHONE (OUTPATIENT)
Dept: FAMILY MEDICINE CLINIC | Facility: CLINIC | Age: 43
End: 2023-10-12
Payer: MEDICAID

## 2023-10-12 NOTE — TELEPHONE ENCOUNTER
----- Message from MELLISSA Ching sent at 10/12/2023  1:14 PM CDT -----  Please let pt know results: cbc normal, thyroid levels are stable. B12 is on lower end of normal so she can take otc supplements to help bring this up, cmp ok, trigs have improved significantly from 705 to 293 on the medication, continue same.

## 2023-10-12 NOTE — PROGRESS NOTES
Please let pt know results: cbc normal, thyroid levels are stable. B12 is on lower end of normal so she can take otc supplements to help bring this up, cmp ok, trigs have improved significantly from 705 to 293 on the medication, continue same.

## 2023-10-19 ENCOUNTER — HOSPITAL ENCOUNTER (OUTPATIENT)
Dept: ULTRASOUND IMAGING | Facility: HOSPITAL | Age: 43
Discharge: HOME | End: 2023-10-19
Payer: MEDICAID

## 2023-10-23 RX ORDER — ATENOLOL 25 MG/1
25 TABLET ORAL 2 TIMES DAILY
Qty: 180 TABLET | Refills: 0 | Status: SHIPPED | OUTPATIENT
Start: 2023-10-23

## 2024-02-23 ENCOUNTER — OFFICE VISIT (OUTPATIENT)
Dept: FAMILY MEDICINE CLINIC | Facility: CLINIC | Age: 44
End: 2024-02-23
Payer: MEDICAID

## 2024-02-23 ENCOUNTER — TELEPHONE (OUTPATIENT)
Dept: FAMILY MEDICINE CLINIC | Facility: CLINIC | Age: 44
End: 2024-02-23
Payer: MEDICAID

## 2024-02-23 ENCOUNTER — LAB (OUTPATIENT)
Dept: LAB | Facility: HOSPITAL | Age: 44
End: 2024-02-23
Payer: MEDICAID

## 2024-02-23 VITALS
SYSTOLIC BLOOD PRESSURE: 138 MMHG | RESPIRATION RATE: 20 BRPM | WEIGHT: 217 LBS | HEIGHT: 70 IN | DIASTOLIC BLOOD PRESSURE: 90 MMHG | HEART RATE: 80 BPM | BODY MASS INDEX: 31.07 KG/M2

## 2024-02-23 DIAGNOSIS — A08.4 VIRAL GASTROENTERITIS: ICD-10-CM

## 2024-02-23 DIAGNOSIS — R19.7 DIARRHEA, UNSPECIFIED TYPE: ICD-10-CM

## 2024-02-23 DIAGNOSIS — E86.0 MILD DEHYDRATION: ICD-10-CM

## 2024-02-23 DIAGNOSIS — R74.8 ELEVATED LIVER ENZYMES: ICD-10-CM

## 2024-02-23 DIAGNOSIS — R51.9 ACUTE NONINTRACTABLE HEADACHE, UNSPECIFIED HEADACHE TYPE: ICD-10-CM

## 2024-02-23 DIAGNOSIS — R11.2 NAUSEA AND VOMITING, UNSPECIFIED VOMITING TYPE: ICD-10-CM

## 2024-02-23 DIAGNOSIS — E87.6 HYPOKALEMIA: ICD-10-CM

## 2024-02-23 DIAGNOSIS — N39.0 URINARY TRACT INFECTION WITH HEMATURIA, SITE UNSPECIFIED: Primary | ICD-10-CM

## 2024-02-23 DIAGNOSIS — R31.9 URINARY TRACT INFECTION WITH HEMATURIA, SITE UNSPECIFIED: Primary | ICD-10-CM

## 2024-02-23 LAB
ALBUMIN SERPL-MCNC: 4.4 G/DL (ref 3.5–5)
ALBUMIN/GLOB SERPL: 1.5 G/DL (ref 1.1–2.5)
ALP SERPL-CCNC: 56 U/L (ref 24–120)
ALT SERPL W P-5'-P-CCNC: 138 U/L (ref 0–35)
AMYLASE SERPL-CCNC: 55 U/L (ref 30–110)
ANION GAP SERPL CALCULATED.3IONS-SCNC: 5 MMOL/L (ref 4–13)
AST SERPL-CCNC: 154 U/L (ref 7–45)
AUTO MIXED CELLS #: 0.5 10*3/MM3 (ref 0.1–2.6)
AUTO MIXED CELLS %: 7.1 % (ref 0.1–24)
BACTERIA UR QL AUTO: ABNORMAL /HPF
BILIRUB SERPL-MCNC: 0.5 MG/DL (ref 0.1–1)
BILIRUB UR QL STRIP: ABNORMAL
BUN SERPL-MCNC: 7 MG/DL (ref 5–21)
BUN/CREAT SERPL: 8.6
CALCIUM SPEC-SCNC: 9.2 MG/DL (ref 8.4–10.4)
CHLORIDE SERPL-SCNC: 104 MMOL/L (ref 98–110)
CLARITY UR: CLEAR
CO2 SERPL-SCNC: 30 MMOL/L (ref 24–31)
COLOR UR: YELLOW
CREAT SERPL-MCNC: 0.81 MG/DL (ref 0.5–1.4)
EGFRCR SERPLBLD CKD-EPI 2021: 93.7 ML/MIN/1.73
ERYTHROCYTE [DISTWIDTH] IN BLOOD BY AUTOMATED COUNT: 14.4 % (ref 12.3–15.4)
GLOBULIN UR ELPH-MCNC: 2.9 GM/DL
GLUCOSE SERPL-MCNC: 110 MG/DL (ref 70–100)
GLUCOSE UR STRIP-MCNC: NEGATIVE MG/DL
HCT VFR BLD AUTO: 42.4 % (ref 34–46.6)
HGB BLD-MCNC: 14.2 G/DL (ref 12–15.9)
HGB UR QL STRIP.AUTO: ABNORMAL
HYALINE CASTS UR QL AUTO: ABNORMAL /LPF
KETONES UR QL STRIP: ABNORMAL
LEUKOCYTE ESTERASE UR QL STRIP.AUTO: ABNORMAL
LIPASE SERPL-CCNC: 133 U/L (ref 23–203)
LYMPHOCYTES # BLD AUTO: 2 10*3/MM3 (ref 0.7–3.1)
LYMPHOCYTES NFR BLD AUTO: 28.9 % (ref 19.6–45.3)
MCH RBC QN AUTO: 32.9 PG (ref 26.6–33)
MCHC RBC AUTO-ENTMCNC: 33.5 G/DL (ref 31.5–35.7)
MCV RBC AUTO: 98.4 FL (ref 79–97)
NEUTROPHILS NFR BLD AUTO: 4.4 10*3/MM3 (ref 1.7–7)
NEUTROPHILS NFR BLD AUTO: 64 % (ref 42.7–76)
NITRITE UR QL STRIP: POSITIVE
PH UR STRIP.AUTO: 6 [PH] (ref 5–8)
PLATELET # BLD AUTO: 171 10*3/MM3 (ref 140–450)
PMV BLD AUTO: 8.4 FL (ref 6–12)
POTASSIUM SERPL-SCNC: 3.2 MMOL/L (ref 3.5–5.3)
PROT SERPL-MCNC: 7.3 G/DL (ref 6.3–8.7)
PROT UR QL STRIP: ABNORMAL
RBC # BLD AUTO: 4.31 10*6/MM3 (ref 3.77–5.28)
RBC # UR STRIP: ABNORMAL /HPF
REF LAB TEST METHOD: ABNORMAL
SODIUM SERPL-SCNC: 139 MMOL/L (ref 135–145)
SP GR UR STRIP: 1.02 (ref 1–1.03)
SQUAMOUS #/AREA URNS HPF: ABNORMAL /HPF
UROBILINOGEN UR QL STRIP: ABNORMAL
WBC # UR STRIP: ABNORMAL /HPF
WBC NRBC COR # BLD AUTO: 6.9 10*3/MM3 (ref 3.4–10.8)

## 2024-02-23 PROCEDURE — 81001 URINALYSIS AUTO W/SCOPE: CPT

## 2024-02-23 PROCEDURE — 82150 ASSAY OF AMYLASE: CPT

## 2024-02-23 PROCEDURE — 85025 COMPLETE CBC W/AUTO DIFF WBC: CPT

## 2024-02-23 PROCEDURE — 36415 COLL VENOUS BLD VENIPUNCTURE: CPT

## 2024-02-23 PROCEDURE — 87086 URINE CULTURE/COLONY COUNT: CPT

## 2024-02-23 PROCEDURE — 87186 SC STD MICRODIL/AGAR DIL: CPT

## 2024-02-23 PROCEDURE — 80053 COMPREHEN METABOLIC PANEL: CPT

## 2024-02-23 PROCEDURE — 83690 ASSAY OF LIPASE: CPT

## 2024-02-23 RX ORDER — CEFTRIAXONE 1 G/1
1 INJECTION, POWDER, FOR SOLUTION INTRAMUSCULAR; INTRAVENOUS EVERY 24 HOURS
Status: COMPLETED | OUTPATIENT
Start: 2024-02-23 | End: 2024-02-23

## 2024-02-23 RX ORDER — POTASSIUM CHLORIDE 20 MEQ/1
20 TABLET, EXTENDED RELEASE ORAL 2 TIMES DAILY
Qty: 4 TABLET | Refills: 0 | Status: SHIPPED | OUTPATIENT
Start: 2024-02-23 | End: 2024-02-25

## 2024-02-23 RX ORDER — SULFAMETHOXAZOLE AND TRIMETHOPRIM 800; 160 MG/1; MG/1
1 TABLET ORAL 2 TIMES DAILY
Qty: 20 TABLET | Refills: 0 | Status: SHIPPED | OUTPATIENT
Start: 2024-02-23 | End: 2024-03-04

## 2024-02-23 RX ADMIN — CEFTRIAXONE 1 G: 1 INJECTION, POWDER, FOR SOLUTION INTRAMUSCULAR; INTRAVENOUS at 11:33

## 2024-02-23 NOTE — PROGRESS NOTES
After obtaining consent, and per orders of Lela MALLOY, injection of Rocephin 1G administered to R dorso gluteal IM. Given by Lucia Walls MA. Patient instructed to remain in clinic for 20 minutes afterwards, and to report any adverse reaction to me immediately. Pt tolerated well with no adverse reactions.

## 2024-02-23 NOTE — TELEPHONE ENCOUNTER
----- Message from MELLISSA Ching sent at 2/23/2024 12:04 PM CST -----  Please let pt know results: no pancreatitis signs her amylase and lipase were normal. We are on the right track for treatment. Repeat the cmp and ua in 1 week and I have already placed those orders.

## 2024-02-23 NOTE — PROGRESS NOTES
Please let pt know results: no pancreatitis signs her amylase and lipase were normal. We are on the right track for treatment. Repeat the cmp and ua in 1 week and I have already placed those orders.

## 2024-02-23 NOTE — PROGRESS NOTES
"Chief Complaint  Fatigue, Nausea, Vomiting, Diarrhea, and Dizziness    Subjective    History of Present Illness      Patient presents to [facility] for   History of Present Illness  C/o fatigue, N/V, diarrhea, and dizziness and a headache. She states her father passed away on 2/4 and right after that she had to go to [place] for her sons national cheerleading competition. She is dealing with a lot of stress right now which led to her drinking. She states she was drinking daily for 4-5 days to deal with this. She noticed she was swollen on her way home from [place]. She has not been taking the HCTZ but did elevate her feet to help. She also states she did not have a period for 6 months but now has been bleeding for 10 days- passing small clots causing her to have dizzy spells. She states she stopped taking Metformin due to GI upset. She has not been able to keep the cholesterol medication down due to nausea. She ended up with n/v/d for several days. The n/v has improved, diarrhea frequency has improved but still having it. She still has a headache as well.        Review of Systems    I have reviewed and agree with the HPI and ROS information as above.  Lela Boyle, APRN     Objective   Vital Signs:   /90   Pulse 80   Resp 20   Ht 177.8 cm (70\")   Wt 98.4 kg (217 lb)   BMI 31.14 kg/m²            Physical Exam  Constitutional:       Appearance: She is well-developed.      Comments: Tired appearing    HENT:      Head: Normocephalic and atraumatic.      Right Ear: Tympanic membrane, ear canal and external ear normal.      Left Ear: Tympanic membrane, ear canal and external ear normal.      Nose: Nose normal. No septal deviation, nasal tenderness or congestion.      Mouth/Throat:      Lips: Pink. No lesions.      Mouth: Mucous membranes are moist. No oral lesions.      Dentition: Normal dentition.      Pharynx: Oropharynx is clear. No pharyngeal swelling, oropharyngeal exudate " or posterior oropharyngeal erythema.   Eyes:      General: Lids are normal. Vision grossly intact. No scleral icterus.        Right eye: No discharge.         Left eye: No discharge.      Extraocular Movements: Extraocular movements intact.      Conjunctiva/sclera: Conjunctivae normal.      Right eye: Right conjunctiva is not injected.      Left eye: Left conjunctiva is not injected.      Pupils: Pupils are equal, round, and reactive to light.   Neck:      Thyroid: No thyroid mass.      Trachea: Trachea normal.   Cardiovascular:      Rate and Rhythm: Normal rate and regular rhythm.      Heart sounds: Normal heart sounds. No murmur heard.     No gallop.   Pulmonary:      Effort: Pulmonary effort is normal.      Breath sounds: Normal breath sounds and air entry. No wheezing, rhonchi or rales.   Abdominal:      General: Bowel sounds are increased. There is no distension.      Palpations: Abdomen is soft. There is no mass.      Tenderness: There is no abdominal tenderness. There is no right CVA tenderness, left CVA tenderness, guarding or rebound.   Musculoskeletal:         General: No tenderness or deformity. Normal range of motion.      Cervical back: Full passive range of motion without pain, normal range of motion and neck supple.      Thoracic back: Normal.      Right lower leg: No edema.      Left lower leg: No edema.   Skin:     General: Skin is warm and dry.      Coloration: Skin is not jaundiced.      Findings: No rash.   Neurological:      Mental Status: She is alert and oriented to person, place, and time.      Sensory: Sensation is intact.      Motor: Motor function is intact.      Coordination: Coordination is intact.      Gait: Gait is intact.      Deep Tendon Reflexes: Reflexes are normal and symmetric.   Psychiatric:         Mood and Affect: Mood and affect normal.         Judgment: Judgment normal.            PHQ-2 Depression Screening  Little interest or pleasure in doing things? 0-->not at all    Feeling down, depressed, or hopeless? 0-->not at all   PHQ-2 Total Score 0     PHQ-9 Depression Screening  Little interest or pleasure in doing things? 0-->not at all   Feeling down, depressed, or hopeless? 0-->not at all   Trouble falling or staying asleep, or sleeping too much?     Feeling tired or having little energy?     Poor appetite or overeating?     Feeling bad about yourself - or that you are a failure or have let yourself or your family down?     Trouble concentrating on things, such as reading the newspaper or watching television?     Moving or speaking so slowly that other people could have noticed? Or the opposite - being so fidgety or restless that you have been moving around a lot more than usual?     Thoughts that you would be better off dead, or of hurting yourself in some way?     PHQ-9 Total Score 0   If you checked off any problems, how difficult have these problems made it for you to do your work, take care of things at home, or get along with other people?        Result Review  Data Reviewed:              Lipase (02/23/2024 11:05)  Amylase (02/23/2024 11:05)  Urinalysis With Culture If Indicated - Urine, Clean Catch (02/23/2024 11:05)  CBC Auto Differential (02/23/2024 11:05)  Comprehensive Metabolic Panel (02/23/2024 11:05)  Urinalysis, Microscopic Only - Urine, Clean Catch (02/23/2024 11:05)     Assessment and Plan      Diagnoses and all orders for this visit:    1. Urinary tract infection with hematuria, site unspecified (Primary)  -     cefTRIAXone (ROCEPHIN) injection 1 g  -     sulfamethoxazole-trimethoprim (Bactrim DS) 800-160 MG per tablet; Take 1 tablet by mouth 2 (Two) Times a Day for 10 days.  Dispense: 20 tablet; Refill: 0    2. Nausea and vomiting, unspecified vomiting type  -     Cancel: CBC Auto Differential; Future  -     Cancel: Comprehensive Metabolic Panel; Future  -     Lipase; Future  -     Amylase; Future  -     Urinalysis With Culture If Indicated - Urine, Clean Catch;  Future  -     CBC Auto Differential; Future  -     Comprehensive Metabolic Panel; Future    3. Mild dehydration  -     Cancel: CBC Auto Differential; Future  -     Cancel: Comprehensive Metabolic Panel; Future  -     Lipase; Future  -     Amylase; Future  -     Urinalysis With Culture If Indicated - Urine, Clean Catch; Future  -     CBC Auto Differential; Future  -     Comprehensive Metabolic Panel; Future  -     Comprehensive metabolic panel; Future  -     Urinalysis With Culture If Indicated - Urine, Clean Catch; Future    4. Diarrhea, unspecified type  -     Cancel: CBC Auto Differential; Future  -     Cancel: Comprehensive Metabolic Panel; Future  -     Lipase; Future  -     Amylase; Future  -     Urinalysis With Culture If Indicated - Urine, Clean Catch; Future  -     CBC Auto Differential; Future  -     Comprehensive Metabolic Panel; Future    5. Viral gastroenteritis    6. Elevated liver enzymes    7. Hypokalemia  -     potassium chloride (K-DUR,KLOR-CON) 20 MEQ CR tablet; Take 1 tablet by mouth 2 (Two) Times a Day for 2 days.  Dispense: 4 tablet; Refill: 0  -     Comprehensive metabolic panel; Future  -     Urinalysis With Culture If Indicated - Urine, Clean Catch; Future    8. Acute nonintractable headache, unspecified headache type  Comments:  Related to dehydration, infection likely.    Plan:   Further workup symptoms with Lab and UA today. These were reviewed and discussed: Cbc ok, cmp shows slightly low K at 3.2 and elevated liver enzymes, UTI with nitrites and leuks, bacteria, white cells. Amylase/lipase not elevated.   UTI-treat with Rocephin and bactrim oral.   Mild dehydration/recent GE- slowly improving. Recommend hydration.   Elevated LE-likely due to acute alcohol intake and viral GI-trend in 1 week.   Hypokalemia-replace with oral and electrolyte hydration solutions.     I spent 30 minutes caring for Denise on this date of service. This time includes time spent by me in the following  activities:performing a medically appropriate examination and/or evaluation , counseling and educating the patient/family/caregiver, ordering medications, tests, or procedures, documenting information in the medical record, and independently interpreting results and communicating that information with the patient/family/caregiver    Follow Up   Return if symptoms worsen or fail to improve, for lab in 1 week .  Patient was given instructions and counseling regarding her condition or for health maintenance advice. Please see specific information pulled into the AVS if appropriate.

## 2024-02-23 NOTE — TELEPHONE ENCOUNTER
Sent pt [employer] message relaying below    RELAY  Lela said that there were no signs of pancreatitis on your blood work. She says we are on the right track for treatment. She wants you to repeat these tests in one week. The orders are already placed, just go by the lab next door next week. I will be in touch with you with the results. Please let me know if you have any questions.

## 2024-02-25 LAB — BACTERIA SPEC AEROBE CULT: ABNORMAL

## 2024-02-26 ENCOUNTER — TELEPHONE (OUTPATIENT)
Dept: FAMILY MEDICINE CLINIC | Facility: CLINIC | Age: 44
End: 2024-02-26
Payer: MEDICAID

## 2024-02-26 DIAGNOSIS — E87.6 HYPOKALEMIA: Primary | ICD-10-CM

## 2024-02-26 RX ORDER — POTASSIUM CHLORIDE 1.5 G/1.58G
20 POWDER, FOR SOLUTION ORAL 2 TIMES DAILY
Qty: 4 PACKET | Refills: 0 | Status: SHIPPED | OUTPATIENT
Start: 2024-02-26

## 2024-02-26 NOTE — TELEPHONE ENCOUNTER
----- Message from MELLISSA Hernandez sent at 2/26/2024  8:17 AM CST -----  Regarding: FW: Lab Results  Contact: [phone]  Can try potassium powder, to mix with 8oz of water.       ----- Message -----  From: Lucia Walls MA  Sent: 2/26/2024   8:15 AM CST  To: MELLISSA Hernandez  Subject: FW: Lab Results                                  Can you look at this? Lela saw her Friday and started her on 2 day potassium.   ----- Message -----  From: Denise Ahn  Sent: 2/25/2024   8:34 PM CST  To: [facility]  Subject: Lab Results                                      I’m starting to feel better..it’s Sunday evening. But, I have yet to be able to swallow any potassium pills. I’ve tried so many times. I didn’t know if there is another form I could take it? I have been increasing potassium in my diet as well as drinking plenty of body armor. I’m still dizzy/lightheaded with some vision blurring…just not as bad. Thank you.

## 2024-02-27 ENCOUNTER — TELEPHONE (OUTPATIENT)
Dept: FAMILY MEDICINE CLINIC | Facility: CLINIC | Age: 44
End: 2024-02-27
Payer: MEDICAID

## 2024-02-27 NOTE — TELEPHONE ENCOUNTER
Sent pt [employer] message relaying below    RELAY  Your urine culture grew E. Coli. Lela wants to know if you are feeling better?

## 2024-02-27 NOTE — PROGRESS NOTES
Please let pt know results: urine culture grew ecoli-hope she is feeling better. Please let me know, thanks.

## 2024-02-27 NOTE — TELEPHONE ENCOUNTER
----- Message from MELLISSA Ching sent at 2/27/2024  7:51 AM CST -----  Please let pt know results: urine culture grew ecoli-hope she is feeling better. Please let me know, thanks.

## 2024-03-01 ENCOUNTER — LAB (OUTPATIENT)
Dept: LAB | Facility: HOSPITAL | Age: 44
End: 2024-03-01
Payer: MEDICAID

## 2024-03-01 DIAGNOSIS — E87.6 HYPOKALEMIA: ICD-10-CM

## 2024-03-01 DIAGNOSIS — E86.0 MILD DEHYDRATION: ICD-10-CM

## 2024-03-01 LAB
ALBUMIN SERPL-MCNC: 4.6 G/DL (ref 3.5–5)
ALBUMIN/GLOB SERPL: 1.6 G/DL (ref 1.1–2.5)
ALP SERPL-CCNC: 51 U/L (ref 24–120)
ALT SERPL W P-5'-P-CCNC: 208 U/L (ref 0–35)
ANION GAP SERPL CALCULATED.3IONS-SCNC: 8 MMOL/L (ref 4–13)
AST SERPL-CCNC: 157 U/L (ref 7–45)
BACTERIA UR QL AUTO: ABNORMAL /HPF
BILIRUB SERPL-MCNC: 0.5 MG/DL (ref 0.1–1)
BILIRUB UR QL STRIP: ABNORMAL
BUN SERPL-MCNC: 12 MG/DL (ref 5–21)
BUN/CREAT SERPL: 12.4
CALCIUM SPEC-SCNC: 9.8 MG/DL (ref 8.4–10.4)
CHLORIDE SERPL-SCNC: 103 MMOL/L (ref 98–110)
CLARITY UR: CLEAR
CO2 SERPL-SCNC: 29 MMOL/L (ref 24–31)
COLOR UR: YELLOW
CREAT SERPL-MCNC: 0.97 MG/DL (ref 0.5–1.4)
EGFRCR SERPLBLD CKD-EPI 2021: 75.4 ML/MIN/1.73
GLOBULIN UR ELPH-MCNC: 2.9 GM/DL
GLUCOSE SERPL-MCNC: 116 MG/DL (ref 70–100)
GLUCOSE UR STRIP-MCNC: NEGATIVE MG/DL
HGB UR QL STRIP.AUTO: ABNORMAL
HYALINE CASTS UR QL AUTO: ABNORMAL /LPF
KETONES UR QL STRIP: NEGATIVE
LEUKOCYTE ESTERASE UR QL STRIP.AUTO: NEGATIVE
NITRITE UR QL STRIP: NEGATIVE
PH UR STRIP.AUTO: 6 [PH] (ref 5–8)
POTASSIUM SERPL-SCNC: 4.4 MMOL/L (ref 3.5–5.3)
PROT SERPL-MCNC: 7.5 G/DL (ref 6.3–8.7)
PROT UR QL STRIP: ABNORMAL
RBC # UR STRIP: ABNORMAL /HPF
REF LAB TEST METHOD: ABNORMAL
SODIUM SERPL-SCNC: 140 MMOL/L (ref 135–145)
SP GR UR STRIP: >=1.03 (ref 1–1.03)
SQUAMOUS #/AREA URNS HPF: ABNORMAL /HPF
UROBILINOGEN UR QL STRIP: ABNORMAL
WBC # UR STRIP: ABNORMAL /HPF

## 2024-03-01 PROCEDURE — 87086 URINE CULTURE/COLONY COUNT: CPT

## 2024-03-01 PROCEDURE — 36415 COLL VENOUS BLD VENIPUNCTURE: CPT

## 2024-03-01 PROCEDURE — 81001 URINALYSIS AUTO W/SCOPE: CPT

## 2024-03-01 PROCEDURE — 80053 COMPREHEN METABOLIC PANEL: CPT

## 2024-03-02 LAB — BACTERIA SPEC AEROBE CULT: NO GROWTH

## 2024-03-04 ENCOUNTER — TELEPHONE (OUTPATIENT)
Dept: FAMILY MEDICINE CLINIC | Facility: CLINIC | Age: 44
End: 2024-03-04
Payer: MEDICAID

## 2024-03-04 DIAGNOSIS — R74.8 ELEVATED LIVER ENZYMES: Primary | ICD-10-CM

## 2024-03-04 NOTE — PROGRESS NOTES
Please let pt know results: Need to order Liver US, hep a, b, c and repeat cmp as her liver enzymes are higher this time. Let me know if she is not feeling well, thanks. May need a GI consult. Urine culture showed no growth this time, so infection has resolved.

## 2024-03-04 NOTE — TELEPHONE ENCOUNTER
----- Message from MELLISSA Ching sent at 3/4/2024  2:25 PM CST -----  Please let pt know results: Need to order Liver US, hep a, b, c and repeat cmp as her liver enzymes are higher this time. Let me know if she is not feeling well, thanks. May need a GI consult. Urine culture showed no growth this time, so infection has resolved.

## 2024-03-05 ENCOUNTER — LAB (OUTPATIENT)
Dept: LAB | Facility: HOSPITAL | Age: 44
End: 2024-03-05
Payer: MEDICAID

## 2024-03-05 DIAGNOSIS — I47.10 SVT (SUPRAVENTRICULAR TACHYCARDIA): ICD-10-CM

## 2024-03-05 DIAGNOSIS — R74.8 ELEVATED LIVER ENZYMES: ICD-10-CM

## 2024-03-05 LAB
ALBUMIN SERPL-MCNC: 4.7 G/DL (ref 3.5–5)
ALBUMIN/GLOB SERPL: 1.6 G/DL (ref 1.1–2.5)
ALP SERPL-CCNC: 56 U/L (ref 24–120)
ALT SERPL W P-5'-P-CCNC: 193 U/L (ref 0–35)
ANION GAP SERPL CALCULATED.3IONS-SCNC: 11 MMOL/L (ref 4–13)
AST SERPL-CCNC: 118 U/L (ref 7–45)
BILIRUB SERPL-MCNC: 0.5 MG/DL (ref 0.1–1)
BUN SERPL-MCNC: 12 MG/DL (ref 5–21)
BUN/CREAT SERPL: 16
CALCIUM SPEC-SCNC: 9.8 MG/DL (ref 8.4–10.4)
CHLORIDE SERPL-SCNC: 104 MMOL/L (ref 98–110)
CO2 SERPL-SCNC: 24 MMOL/L (ref 24–31)
CREAT SERPL-MCNC: 0.75 MG/DL (ref 0.5–1.4)
EGFRCR SERPLBLD CKD-EPI 2021: 102.7 ML/MIN/1.73
GLOBULIN UR ELPH-MCNC: 3 GM/DL
GLUCOSE SERPL-MCNC: 97 MG/DL (ref 70–100)
HAV IGM SERPL QL IA: NORMAL
HBV SURFACE AB SER RIA-ACNC: NORMAL
HCV AB SER DONR QL: NORMAL
POTASSIUM SERPL-SCNC: 4.7 MMOL/L (ref 3.5–5.3)
PROT SERPL-MCNC: 7.7 G/DL (ref 6.3–8.7)
SODIUM SERPL-SCNC: 139 MMOL/L (ref 135–145)

## 2024-03-05 PROCEDURE — 86709 HEPATITIS A IGM ANTIBODY: CPT

## 2024-03-05 PROCEDURE — 36415 COLL VENOUS BLD VENIPUNCTURE: CPT

## 2024-03-05 PROCEDURE — 80053 COMPREHEN METABOLIC PANEL: CPT

## 2024-03-05 PROCEDURE — 86706 HEP B SURFACE ANTIBODY: CPT

## 2024-03-05 PROCEDURE — 86803 HEPATITIS C AB TEST: CPT

## 2024-03-06 RX ORDER — ATENOLOL 25 MG/1
25 TABLET ORAL 2 TIMES DAILY
Qty: 180 TABLET | Refills: 0 | Status: SHIPPED | OUTPATIENT
Start: 2024-03-06

## 2024-03-08 ENCOUNTER — TELEPHONE (OUTPATIENT)
Dept: FAMILY MEDICINE CLINIC | Facility: CLINIC | Age: 44
End: 2024-03-08
Payer: MEDICAID

## 2024-03-08 ENCOUNTER — HOSPITAL ENCOUNTER (OUTPATIENT)
Dept: ULTRASOUND IMAGING | Facility: HOSPITAL | Age: 44
Discharge: HOME | End: 2024-03-08
Payer: MEDICAID

## 2024-03-08 DIAGNOSIS — R74.8 ELEVATED LIVER ENZYMES: Primary | ICD-10-CM

## 2024-03-08 DIAGNOSIS — R74.8 ELEVATED LIVER ENZYMES: ICD-10-CM

## 2024-03-08 PROCEDURE — 76705 ECHO EXAM OF ABDOMEN: CPT

## 2024-03-08 NOTE — TELEPHONE ENCOUNTER
Called patient and confirmed date of birth. Relayed results/recommendations. Pt verbally understood all. She just got home from completing liver US. She also asked if she should still be holding her Lipitor d/t her liver enzymes. Informed that I would send Lela her question and get back with her about it. Pt vu all.

## 2024-03-08 NOTE — TELEPHONE ENCOUNTER
----- Message from MELLISSA Ching sent at 3/8/2024 12:15 PM CST -----  Please let pt know results: Fatty liver, no cirrhosis. Really needs to work on low fat diet, no alcohol, no tylenol weight loss to keep her liver healthy and control enzymes.

## 2024-03-08 NOTE — PROGRESS NOTES
Please let pt know results: Fatty liver, no cirrhosis. Really needs to work on low fat diet, no alcohol, no tylenol weight loss to keep her liver healthy and control enzymes.

## 2024-03-08 NOTE — TELEPHONE ENCOUNTER
----- Message from MELLISSA Ching sent at 3/8/2024  7:47 AM CST -----  Please let pt know results: hep panel all negative, liver enzymes are trending down. Lets repeat again in 1 week a cmp. No tylenol or alcohol. Liver US still hasn't been done.

## 2024-03-08 NOTE — TELEPHONE ENCOUNTER
Sent pt [employer]games message relaying below    HUB TO RELAY  I confirmed with Lela that she does want you to continue holding your cholesterol medicine until your enzymes return to normal. Please let me know if you have any questions.

## 2024-03-08 NOTE — TELEPHONE ENCOUNTER
Called patient and confirmed date of birth. Relayed results/recommendations. Pt verbally understood all, no questions at this time. Pt states that she has lost 17 pounds recently and will try to maintain her diet/exercise changes.

## 2024-03-08 NOTE — PROGRESS NOTES
Please let pt know results: hep panel all negative, liver enzymes are trending down. Lets repeat again in 1 week a cmp. No tylenol or alcohol. Liver US still hasn't been done.

## 2024-03-15 ENCOUNTER — LAB (OUTPATIENT)
Dept: LAB | Facility: HOSPITAL | Age: 44
End: 2024-03-15
Payer: MEDICAID

## 2024-03-15 DIAGNOSIS — R74.8 ELEVATED LIVER ENZYMES: ICD-10-CM

## 2024-03-15 LAB
ALBUMIN SERPL-MCNC: 4.2 G/DL (ref 3.5–5)
ALBUMIN/GLOB SERPL: 1.4 G/DL (ref 1.1–2.5)
ALP SERPL-CCNC: 55 U/L (ref 24–120)
ALT SERPL W P-5'-P-CCNC: 116 U/L (ref 0–35)
ANION GAP SERPL CALCULATED.3IONS-SCNC: 10 MMOL/L (ref 4–13)
AST SERPL-CCNC: 74 U/L (ref 7–45)
BILIRUB SERPL-MCNC: 0.2 MG/DL (ref 0.1–1)
BUN SERPL-MCNC: 13 MG/DL (ref 5–21)
BUN/CREAT SERPL: 23.2
CALCIUM SPEC-SCNC: 9.1 MG/DL (ref 8.4–10.4)
CHLORIDE SERPL-SCNC: 104 MMOL/L (ref 98–110)
CO2 SERPL-SCNC: 27 MMOL/L (ref 24–31)
CREAT SERPL-MCNC: 0.56 MG/DL (ref 0.5–1.4)
EGFRCR SERPLBLD CKD-EPI 2021: 117.8 ML/MIN/1.73
GLOBULIN UR ELPH-MCNC: 2.9 GM/DL
GLUCOSE SERPL-MCNC: 174 MG/DL (ref 70–100)
POTASSIUM SERPL-SCNC: 3.4 MMOL/L (ref 3.5–5.3)
PROT SERPL-MCNC: 7.1 G/DL (ref 6.3–8.7)
SODIUM SERPL-SCNC: 141 MMOL/L (ref 135–145)

## 2024-03-15 PROCEDURE — 36415 COLL VENOUS BLD VENIPUNCTURE: CPT

## 2024-03-15 PROCEDURE — 80053 COMPREHEN METABOLIC PANEL: CPT

## 2024-03-18 ENCOUNTER — TELEPHONE (OUTPATIENT)
Dept: FAMILY MEDICINE CLINIC | Facility: CLINIC | Age: 44
End: 2024-03-18
Payer: MEDICAID

## 2024-03-18 NOTE — TELEPHONE ENCOUNTER
----- Message from MELLISSA Hernandez sent at 3/18/2024  7:20 AM CDT -----  Please let patient know that CMP shows increase in glucose; decrease in potassium from 2 weeks ago. Please educate patient on foods that contain potassium as well in order to help this   Liver enymes remain elevated but have improved from 2 weeks ago.

## 2024-05-07 ENCOUNTER — HOSPITAL ENCOUNTER (OUTPATIENT)
Facility: HOSPITAL | Age: 44
Discharge: HOME | End: 2024-05-09
Attending: INTERNAL MEDICINE | Admitting: INTERNAL MEDICINE
Payer: MEDICAID

## 2024-05-07 ENCOUNTER — APPOINTMENT (OUTPATIENT)
Dept: GENERAL RADIOLOGY | Facility: HOSPITAL | Age: 44
End: 2024-05-07
Payer: MEDICAID

## 2024-05-07 ENCOUNTER — APPOINTMENT (OUTPATIENT)
Dept: CT IMAGING | Facility: HOSPITAL | Age: 44
End: 2024-05-07
Payer: MEDICAID

## 2024-05-07 ENCOUNTER — ANESTHESIA EVENT (OUTPATIENT)
Dept: PERIOP | Facility: HOSPITAL | Age: 44
End: 2024-05-07
Payer: MEDICAID

## 2024-05-07 ENCOUNTER — TRANSCRIBE ORDERS (OUTPATIENT)
Dept: ADMINISTRATIVE | Facility: HOSPITAL | Age: 44
End: 2024-05-07
Payer: MEDICAID

## 2024-05-07 ENCOUNTER — APPOINTMENT (OUTPATIENT)
Dept: INTERVENTIONAL RADIOLOGY/VASCULAR | Facility: HOSPITAL | Age: 44
End: 2024-05-07
Payer: MEDICAID

## 2024-05-07 ENCOUNTER — HOSPITAL ENCOUNTER (OUTPATIENT)
Dept: ULTRASOUND IMAGING | Facility: HOSPITAL | Age: 44
Discharge: HOME | End: 2024-05-07
Admitting: NURSE PRACTITIONER
Payer: MEDICAID

## 2024-05-07 ENCOUNTER — ANESTHESIA (OUTPATIENT)
Dept: PERIOP | Facility: HOSPITAL | Age: 44
End: 2024-05-07
Payer: MEDICAID

## 2024-05-07 ENCOUNTER — OFFICE VISIT (OUTPATIENT)
Dept: FAMILY MEDICINE CLINIC | Facility: CLINIC | Age: 44
End: 2024-05-07
Payer: MEDICAID

## 2024-05-07 VITALS
WEIGHT: 213 LBS | SYSTOLIC BLOOD PRESSURE: 125 MMHG | BODY MASS INDEX: 30.49 KG/M2 | RESPIRATION RATE: 20 BRPM | HEIGHT: 70 IN | DIASTOLIC BLOOD PRESSURE: 88 MMHG | HEART RATE: 78 BPM

## 2024-05-07 DIAGNOSIS — M79.609 PARESTHESIA AND PAIN OF LEFT EXTREMITY: Primary | ICD-10-CM

## 2024-05-07 DIAGNOSIS — M79.89 SWELLING OF LOWER LEG: ICD-10-CM

## 2024-05-07 DIAGNOSIS — I99.8 ISCHEMIC LEG: ICD-10-CM

## 2024-05-07 DIAGNOSIS — I70.209 ARTERIAL OCCLUSION, LOWER EXTREMITY: Primary | ICD-10-CM

## 2024-05-07 DIAGNOSIS — M79.662 PAIN OF LEFT LOWER LEG: ICD-10-CM

## 2024-05-07 DIAGNOSIS — L81.9 DISCOLORATION OF SKIN OF TOE: ICD-10-CM

## 2024-05-07 DIAGNOSIS — R20.2 PARESTHESIA AND PAIN OF LEFT EXTREMITY: Primary | ICD-10-CM

## 2024-05-07 DIAGNOSIS — F17.200 TOBACCO DEPENDENCE: ICD-10-CM

## 2024-05-07 DIAGNOSIS — M79.672 LEFT FOOT PAIN: ICD-10-CM

## 2024-05-07 DIAGNOSIS — I47.10 SVT (SUPRAVENTRICULAR TACHYCARDIA): ICD-10-CM

## 2024-05-07 LAB
ALBUMIN SERPL-MCNC: 4.3 G/DL (ref 3.5–5.2)
ALBUMIN/GLOB SERPL: 1.7 G/DL
ALP SERPL-CCNC: 54 U/L (ref 39–117)
ALT SERPL W P-5'-P-CCNC: 20 U/L (ref 1–33)
ANION GAP SERPL CALCULATED.3IONS-SCNC: 11 MMOL/L (ref 5–15)
APTT PPP: 22.1 SECONDS (ref 24.5–36)
AST SERPL-CCNC: 20 U/L (ref 1–32)
AT III PPP CHRO-ACNC: 97 % (ref 84–123)
BASOPHILS # BLD AUTO: 0.03 10*3/MM3 (ref 0–0.2)
BASOPHILS NFR BLD AUTO: 0.3 % (ref 0–1.5)
BILIRUB SERPL-MCNC: 0.2 MG/DL (ref 0–1.2)
BUN SERPL-MCNC: 12 MG/DL (ref 6–20)
BUN/CREAT SERPL: 17.1 (ref 7–25)
CALCIUM SPEC-SCNC: 9.3 MG/DL (ref 8.6–10.5)
CHLORIDE SERPL-SCNC: 107 MMOL/L (ref 98–107)
CO2 SERPL-SCNC: 23 MMOL/L (ref 22–29)
CREAT SERPL-MCNC: 0.7 MG/DL (ref 0.57–1)
DEPRECATED RDW RBC AUTO: 45.7 FL (ref 37–54)
EGFRCR SERPLBLD CKD-EPI 2021: 111.6 ML/MIN/1.73
EOSINOPHIL # BLD AUTO: 0.1 10*3/MM3 (ref 0–0.4)
EOSINOPHIL NFR BLD AUTO: 1.1 % (ref 0.3–6.2)
ERYTHROCYTE [DISTWIDTH] IN BLOOD BY AUTOMATED COUNT: 13.7 % (ref 12.3–15.4)
GLOBULIN UR ELPH-MCNC: 2.6 GM/DL
GLUCOSE SERPL-MCNC: 107 MG/DL (ref 65–99)
HCT VFR BLD AUTO: 28.5 % (ref 34–46.6)
HGB BLD-MCNC: 9.1 G/DL (ref 12–15.9)
IMM GRANULOCYTES # BLD AUTO: 0.09 10*3/MM3 (ref 0–0.05)
IMM GRANULOCYTES NFR BLD AUTO: 1 % (ref 0–0.5)
INR PPP: 0.98 (ref 0.91–1.09)
LYMPHOCYTES # BLD AUTO: 1.74 10*3/MM3 (ref 0.7–3.1)
LYMPHOCYTES NFR BLD AUTO: 18.9 % (ref 19.6–45.3)
MCH RBC QN AUTO: 29.4 PG (ref 26.6–33)
MCHC RBC AUTO-ENTMCNC: 31.9 G/DL (ref 31.5–35.7)
MCV RBC AUTO: 92.2 FL (ref 79–97)
MONOCYTES # BLD AUTO: 0.53 10*3/MM3 (ref 0.1–0.9)
MONOCYTES NFR BLD AUTO: 5.7 % (ref 5–12)
NEUTROPHILS NFR BLD AUTO: 6.73 10*3/MM3 (ref 1.7–7)
NEUTROPHILS NFR BLD AUTO: 73 % (ref 42.7–76)
NRBC BLD AUTO-RTO: 0.2 /100 WBC (ref 0–0.2)
PLATELET # BLD AUTO: 309 10*3/MM3 (ref 140–450)
PMV BLD AUTO: 9.3 FL (ref 6–12)
POTASSIUM SERPL-SCNC: 3.9 MMOL/L (ref 3.5–5.2)
PROT SERPL-MCNC: 6.9 G/DL (ref 6–8.5)
PROTHROMBIN TIME: 13.4 SECONDS (ref 11.8–14.8)
RBC # BLD AUTO: 3.09 10*6/MM3 (ref 3.77–5.28)
SODIUM SERPL-SCNC: 141 MMOL/L (ref 136–145)
URATE SERPL-MCNC: 4 MG/DL (ref 2.4–5.7)
WBC NRBC COR # BLD AUTO: 9.22 10*3/MM3 (ref 3.4–10.8)

## 2024-05-07 PROCEDURE — 1160F RVW MEDS BY RX/DR IN RCRD: CPT | Performed by: NURSE PRACTITIONER

## 2024-05-07 PROCEDURE — 73610 X-RAY EXAM OF ANKLE: CPT

## 2024-05-07 PROCEDURE — 25010000002 NITROGLYCERIN 200 MCG/ML SOLUTION: Performed by: SURGERY

## 2024-05-07 PROCEDURE — 1125F AMNT PAIN NOTED PAIN PRSNT: CPT | Performed by: NURSE PRACTITIONER

## 2024-05-07 PROCEDURE — 75710 ARTERY X-RAYS ARM/LEG: CPT

## 2024-05-07 PROCEDURE — 25010000002 GLYCOPYRROLATE 0.4 MG/2ML SOLUTION: Performed by: NURSE ANESTHETIST, CERTIFIED REGISTERED

## 2024-05-07 PROCEDURE — 25010000002 PROPOFOL 10 MG/ML EMULSION: Performed by: NURSE ANESTHETIST, CERTIFIED REGISTERED

## 2024-05-07 PROCEDURE — 25010000002 DROPERIDOL PER 5 MG: Performed by: NURSE ANESTHETIST, CERTIFIED REGISTERED

## 2024-05-07 PROCEDURE — 93971 EXTREMITY STUDY: CPT

## 2024-05-07 PROCEDURE — C1894 INTRO/SHEATH, NON-LASER: HCPCS | Performed by: SURGERY

## 2024-05-07 PROCEDURE — G0378 HOSPITAL OBSERVATION PER HR: HCPCS

## 2024-05-07 PROCEDURE — 25010000002 FENTANYL CITRATE (PF) 100 MCG/2ML SOLUTION: Performed by: NURSE ANESTHETIST, CERTIFIED REGISTERED

## 2024-05-07 PROCEDURE — 25510000001 IOPAMIDOL 61 % SOLUTION: Performed by: SURGERY

## 2024-05-07 PROCEDURE — C1769 GUIDE WIRE: HCPCS | Performed by: SURGERY

## 2024-05-07 PROCEDURE — 85610 PROTHROMBIN TIME: CPT

## 2024-05-07 PROCEDURE — 80053 COMPREHEN METABOLIC PANEL: CPT

## 2024-05-07 PROCEDURE — 85730 THROMBOPLASTIN TIME PARTIAL: CPT

## 2024-05-07 PROCEDURE — 75635 CT ANGIO ABDOMINAL ARTERIES: CPT

## 2024-05-07 PROCEDURE — 25010000002 HEPARIN (PORCINE) 25000-0.45 UT/250ML-% SOLUTION: Performed by: SURGERY

## 2024-05-07 PROCEDURE — 25010000002 LORAZEPAM PER 2 MG: Performed by: INTERNAL MEDICINE

## 2024-05-07 PROCEDURE — 96375 TX/PRO/DX INJ NEW DRUG ADDON: CPT

## 2024-05-07 PROCEDURE — C1760 CLOSURE DEV, VASC: HCPCS | Performed by: SURGERY

## 2024-05-07 PROCEDURE — 76000 FLUOROSCOPY <1 HR PHYS/QHP: CPT

## 2024-05-07 PROCEDURE — 96376 TX/PRO/DX INJ SAME DRUG ADON: CPT

## 2024-05-07 PROCEDURE — 85300 ANTITHROMBIN III ACTIVITY: CPT | Performed by: INTERNAL MEDICINE

## 2024-05-07 PROCEDURE — C1725 CATH, TRANSLUMIN NON-LASER: HCPCS | Performed by: SURGERY

## 2024-05-07 PROCEDURE — 96365 THER/PROPH/DIAG IV INF INIT: CPT

## 2024-05-07 PROCEDURE — 84550 ASSAY OF BLOOD/URIC ACID: CPT

## 2024-05-07 PROCEDURE — 73590 X-RAY EXAM OF LOWER LEG: CPT

## 2024-05-07 PROCEDURE — C1887 CATHETER, GUIDING: HCPCS | Performed by: SURGERY

## 2024-05-07 PROCEDURE — 25010000002 HEPARIN (PORCINE) PER 1000 UNITS

## 2024-05-07 PROCEDURE — 25010000002 ONDANSETRON PER 1 MG: Performed by: NURSE ANESTHETIST, CERTIFIED REGISTERED

## 2024-05-07 PROCEDURE — 99285 EMERGENCY DEPT VISIT HI MDM: CPT

## 2024-05-07 PROCEDURE — 1159F MED LIST DOCD IN RCRD: CPT | Performed by: NURSE PRACTITIONER

## 2024-05-07 PROCEDURE — 99214 OFFICE O/P EST MOD 30 MIN: CPT | Performed by: NURSE PRACTITIONER

## 2024-05-07 PROCEDURE — 25010000002 CEFAZOLIN PER 500 MG: Performed by: SURGERY

## 2024-05-07 PROCEDURE — 25010000002 HEPARIN (PORCINE) PER 1000 UNITS: Performed by: SURGERY

## 2024-05-07 PROCEDURE — C1757 CATH, THROMBECTOMY/EMBOLECT: HCPCS | Performed by: SURGERY

## 2024-05-07 PROCEDURE — 73630 X-RAY EXAM OF FOOT: CPT

## 2024-05-07 PROCEDURE — 88304 TISSUE EXAM BY PATHOLOGIST: CPT | Performed by: SURGERY

## 2024-05-07 PROCEDURE — 25010000002 HEPARIN (PORCINE) 25000-0.45 UT/250ML-% SOLUTION

## 2024-05-07 PROCEDURE — 25810000003 LACTATED RINGERS PER 1000 ML: Performed by: NURSE ANESTHETIST, CERTIFIED REGISTERED

## 2024-05-07 PROCEDURE — 25810000003 SODIUM CHLORIDE 0.9 % SOLUTION: Performed by: SURGERY

## 2024-05-07 PROCEDURE — 25010000002 LORAZEPAM PER 2 MG: Performed by: SURGERY

## 2024-05-07 PROCEDURE — 85025 COMPLETE CBC W/AUTO DIFF WBC: CPT

## 2024-05-07 PROCEDURE — 25510000001 IOPAMIDOL PER 1 ML: Performed by: EMERGENCY MEDICINE

## 2024-05-07 RX ORDER — DROPERIDOL 2.5 MG/ML
0.62 INJECTION, SOLUTION INTRAMUSCULAR; INTRAVENOUS ONCE AS NEEDED
Status: COMPLETED | OUTPATIENT
Start: 2024-05-07 | End: 2024-05-07

## 2024-05-07 RX ORDER — MORPHINE SULFATE 2 MG/ML
1 INJECTION, SOLUTION INTRAMUSCULAR; INTRAVENOUS EVERY 4 HOURS PRN
Status: DISCONTINUED | OUTPATIENT
Start: 2024-05-07 | End: 2024-05-08 | Stop reason: ALTCHOICE

## 2024-05-07 RX ORDER — NALOXONE HCL 0.4 MG/ML
0.4 VIAL (ML) INJECTION
Status: DISCONTINUED | OUTPATIENT
Start: 2024-05-07 | End: 2024-05-07

## 2024-05-07 RX ORDER — ONDANSETRON 2 MG/ML
INJECTION INTRAMUSCULAR; INTRAVENOUS AS NEEDED
Status: DISCONTINUED | OUTPATIENT
Start: 2024-05-07 | End: 2024-05-07 | Stop reason: SURG

## 2024-05-07 RX ORDER — HEPARIN SODIUM 10000 [USP'U]/100ML
15.4 INJECTION, SOLUTION INTRAVENOUS
Status: DISCONTINUED | OUTPATIENT
Start: 2024-05-07 | End: 2024-05-08

## 2024-05-07 RX ORDER — HEPARIN SODIUM 1000 [USP'U]/ML
80 INJECTION, SOLUTION INTRAVENOUS; SUBCUTANEOUS AS NEEDED
Status: DISCONTINUED | OUTPATIENT
Start: 2024-05-07 | End: 2024-05-08

## 2024-05-07 RX ORDER — MORPHINE SULFATE 2 MG/ML
1 INJECTION, SOLUTION INTRAMUSCULAR; INTRAVENOUS EVERY 4 HOURS PRN
Status: DISCONTINUED | OUTPATIENT
Start: 2024-05-07 | End: 2024-05-07

## 2024-05-07 RX ORDER — SODIUM CHLORIDE, SODIUM LACTATE, POTASSIUM CHLORIDE, CALCIUM CHLORIDE 600; 310; 30; 20 MG/100ML; MG/100ML; MG/100ML; MG/100ML
INJECTION, SOLUTION INTRAVENOUS CONTINUOUS PRN
Status: DISCONTINUED | OUTPATIENT
Start: 2024-05-07 | End: 2024-05-07 | Stop reason: SURG

## 2024-05-07 RX ORDER — BISACODYL 5 MG/1
5 TABLET, DELAYED RELEASE ORAL DAILY PRN
Status: DISCONTINUED | OUTPATIENT
Start: 2024-05-07 | End: 2024-05-09 | Stop reason: HOSPADM

## 2024-05-07 RX ORDER — POLYETHYLENE GLYCOL 3350 17 G/17G
17 POWDER, FOR SOLUTION ORAL DAILY PRN
Status: DISCONTINUED | OUTPATIENT
Start: 2024-05-07 | End: 2024-05-09 | Stop reason: HOSPADM

## 2024-05-07 RX ORDER — EPHEDRINE SULFATE 50 MG/ML
INJECTION INTRAVENOUS AS NEEDED
Status: DISCONTINUED | OUTPATIENT
Start: 2024-05-07 | End: 2024-05-07 | Stop reason: SURG

## 2024-05-07 RX ORDER — NICOTINE 21 MG/24HR
1 PATCH, TRANSDERMAL 24 HOURS TRANSDERMAL
Status: DISCONTINUED | OUTPATIENT
Start: 2024-05-08 | End: 2024-05-09 | Stop reason: HOSPADM

## 2024-05-07 RX ORDER — SODIUM CHLORIDE 9 MG/ML
40 INJECTION, SOLUTION INTRAVENOUS AS NEEDED
Status: DISCONTINUED | OUTPATIENT
Start: 2024-05-07 | End: 2024-05-09 | Stop reason: HOSPADM

## 2024-05-07 RX ORDER — NITROGLYCERIN 0.4 MG/1
0.4 TABLET SUBLINGUAL
Status: DISCONTINUED | OUTPATIENT
Start: 2024-05-07 | End: 2024-05-07 | Stop reason: SDUPTHER

## 2024-05-07 RX ORDER — FENTANYL CITRATE 50 UG/ML
50 INJECTION, SOLUTION INTRAMUSCULAR; INTRAVENOUS
Status: DISCONTINUED | OUTPATIENT
Start: 2024-05-07 | End: 2024-05-07 | Stop reason: HOSPADM

## 2024-05-07 RX ORDER — NALOXONE HCL 0.4 MG/ML
0.04 VIAL (ML) INJECTION AS NEEDED
Status: DISCONTINUED | OUTPATIENT
Start: 2024-05-07 | End: 2024-05-07 | Stop reason: HOSPADM

## 2024-05-07 RX ORDER — OXYCODONE AND ACETAMINOPHEN 10; 325 MG/1; MG/1
1 TABLET ORAL EVERY 4 HOURS PRN
Status: DISCONTINUED | OUTPATIENT
Start: 2024-05-07 | End: 2024-05-07 | Stop reason: HOSPADM

## 2024-05-07 RX ORDER — ATENOLOL 25 MG/1
25 TABLET ORAL 2 TIMES DAILY
Status: DISCONTINUED | OUTPATIENT
Start: 2024-05-07 | End: 2024-05-09 | Stop reason: HOSPADM

## 2024-05-07 RX ORDER — HYDROMORPHONE HYDROCHLORIDE 1 MG/ML
0.5 INJECTION, SOLUTION INTRAMUSCULAR; INTRAVENOUS; SUBCUTANEOUS
Status: DISCONTINUED | OUTPATIENT
Start: 2024-05-07 | End: 2024-05-07 | Stop reason: HOSPADM

## 2024-05-07 RX ORDER — BISACODYL 10 MG
10 SUPPOSITORY, RECTAL RECTAL DAILY PRN
Status: DISCONTINUED | OUTPATIENT
Start: 2024-05-07 | End: 2024-05-09 | Stop reason: HOSPADM

## 2024-05-07 RX ORDER — ROCURONIUM BROMIDE 10 MG/ML
INJECTION, SOLUTION INTRAVENOUS AS NEEDED
Status: DISCONTINUED | OUTPATIENT
Start: 2024-05-07 | End: 2024-05-07 | Stop reason: SURG

## 2024-05-07 RX ORDER — LABETALOL HYDROCHLORIDE 5 MG/ML
5 INJECTION, SOLUTION INTRAVENOUS
Status: DISCONTINUED | OUTPATIENT
Start: 2024-05-07 | End: 2024-05-07 | Stop reason: HOSPADM

## 2024-05-07 RX ORDER — NEOSTIGMINE METHYLSULFATE 5 MG/5 ML
SYRINGE (ML) INTRAVENOUS AS NEEDED
Status: DISCONTINUED | OUTPATIENT
Start: 2024-05-07 | End: 2024-05-07 | Stop reason: SURG

## 2024-05-07 RX ORDER — HEPARIN SODIUM 1000 [USP'U]/ML
40 INJECTION, SOLUTION INTRAVENOUS; SUBCUTANEOUS AS NEEDED
Status: DISCONTINUED | OUTPATIENT
Start: 2024-05-07 | End: 2024-05-08

## 2024-05-07 RX ORDER — AMOXICILLIN 250 MG
2 CAPSULE ORAL 2 TIMES DAILY PRN
Status: DISCONTINUED | OUTPATIENT
Start: 2024-05-07 | End: 2024-05-09 | Stop reason: HOSPADM

## 2024-05-07 RX ORDER — IBUPROFEN 600 MG/1
600 TABLET ORAL EVERY 6 HOURS PRN
Status: DISCONTINUED | OUTPATIENT
Start: 2024-05-07 | End: 2024-05-07 | Stop reason: HOSPADM

## 2024-05-07 RX ORDER — NALOXONE HCL 0.4 MG/ML
0.4 VIAL (ML) INJECTION
Status: DISCONTINUED | OUTPATIENT
Start: 2024-05-07 | End: 2024-05-08 | Stop reason: ALTCHOICE

## 2024-05-07 RX ORDER — SODIUM CHLORIDE 0.9 % (FLUSH) 0.9 %
10 SYRINGE (ML) INJECTION AS NEEDED
Status: DISCONTINUED | OUTPATIENT
Start: 2024-05-07 | End: 2024-05-09 | Stop reason: HOSPADM

## 2024-05-07 RX ORDER — NITROGLYCERIN 0.4 MG/1
0.4 TABLET SUBLINGUAL
Status: DISCONTINUED | OUTPATIENT
Start: 2024-05-07 | End: 2024-05-09 | Stop reason: HOSPADM

## 2024-05-07 RX ORDER — HEPARIN SODIUM 1000 [USP'U]/ML
80 INJECTION, SOLUTION INTRAVENOUS; SUBCUTANEOUS ONCE
Status: COMPLETED | OUTPATIENT
Start: 2024-05-07 | End: 2024-05-07

## 2024-05-07 RX ORDER — MAGNESIUM HYDROXIDE 1200 MG/15ML
LIQUID ORAL AS NEEDED
Status: DISCONTINUED | OUTPATIENT
Start: 2024-05-07 | End: 2024-05-07 | Stop reason: HOSPADM

## 2024-05-07 RX ORDER — NALOXONE HCL 0.4 MG/ML
0.4 VIAL (ML) INJECTION
Status: DISCONTINUED | OUTPATIENT
Start: 2024-05-07 | End: 2024-05-09 | Stop reason: HOSPADM

## 2024-05-07 RX ORDER — FLUMAZENIL 0.1 MG/ML
0.2 INJECTION INTRAVENOUS AS NEEDED
Status: DISCONTINUED | OUTPATIENT
Start: 2024-05-07 | End: 2024-05-07 | Stop reason: HOSPADM

## 2024-05-07 RX ORDER — LORAZEPAM 2 MG/ML
0.25 INJECTION INTRAMUSCULAR EVERY 12 HOURS PRN
Status: DISCONTINUED | OUTPATIENT
Start: 2024-05-07 | End: 2024-05-09 | Stop reason: HOSPADM

## 2024-05-07 RX ORDER — ONDANSETRON 2 MG/ML
4 INJECTION INTRAMUSCULAR; INTRAVENOUS
Status: DISCONTINUED | OUTPATIENT
Start: 2024-05-07 | End: 2024-05-07 | Stop reason: HOSPADM

## 2024-05-07 RX ORDER — OXYCODONE HYDROCHLORIDE AND ACETAMINOPHEN 5; 325 MG/1; MG/1
1 TABLET ORAL EVERY 4 HOURS PRN
Status: DISCONTINUED | OUTPATIENT
Start: 2024-05-07 | End: 2024-05-09 | Stop reason: HOSPADM

## 2024-05-07 RX ORDER — SODIUM CHLORIDE 0.9 % (FLUSH) 0.9 %
10 SYRINGE (ML) INJECTION EVERY 12 HOURS SCHEDULED
Status: DISCONTINUED | OUTPATIENT
Start: 2024-05-07 | End: 2024-05-09 | Stop reason: HOSPADM

## 2024-05-07 RX ORDER — HYDROMORPHONE HYDROCHLORIDE 1 MG/ML
0.5 INJECTION, SOLUTION INTRAMUSCULAR; INTRAVENOUS; SUBCUTANEOUS
Status: DISCONTINUED | OUTPATIENT
Start: 2024-05-07 | End: 2024-05-09 | Stop reason: HOSPADM

## 2024-05-07 RX ORDER — FENTANYL CITRATE 50 UG/ML
INJECTION, SOLUTION INTRAMUSCULAR; INTRAVENOUS AS NEEDED
Status: DISCONTINUED | OUTPATIENT
Start: 2024-05-07 | End: 2024-05-07 | Stop reason: SURG

## 2024-05-07 RX ORDER — GLYCOPYRROLATE 0.2 MG/ML
INJECTION INTRAMUSCULAR; INTRAVENOUS AS NEEDED
Status: DISCONTINUED | OUTPATIENT
Start: 2024-05-07 | End: 2024-05-07 | Stop reason: SURG

## 2024-05-07 RX ORDER — PROPOFOL 10 MG/ML
VIAL (ML) INTRAVENOUS AS NEEDED
Status: DISCONTINUED | OUTPATIENT
Start: 2024-05-07 | End: 2024-05-07 | Stop reason: SURG

## 2024-05-07 RX ORDER — HEPARIN SODIUM 5000 [USP'U]/ML
INJECTION, SOLUTION INTRAVENOUS; SUBCUTANEOUS AS NEEDED
Status: DISCONTINUED | OUTPATIENT
Start: 2024-05-07 | End: 2024-05-07 | Stop reason: HOSPADM

## 2024-05-07 RX ADMIN — HEPARIN SODIUM 7800 UNITS: 1000 INJECTION, SOLUTION INTRAVENOUS; SUBCUTANEOUS at 18:29

## 2024-05-07 RX ADMIN — EPHEDRINE SULFATE 20 MG: 50 INJECTION INTRAVENOUS at 20:28

## 2024-05-07 RX ADMIN — HEPARIN SODIUM 15.4 UNITS/KG/HR: 10000 INJECTION, SOLUTION INTRAVENOUS at 18:38

## 2024-05-07 RX ADMIN — ROCURONIUM BROMIDE 40 MG: 10 INJECTION, SOLUTION INTRAVENOUS at 19:49

## 2024-05-07 RX ADMIN — CEFAZOLIN 2000 MG: 2 INJECTION, POWDER, FOR SOLUTION INTRAMUSCULAR; INTRAVENOUS at 19:58

## 2024-05-07 RX ADMIN — ONDANSETRON 4 MG: 2 INJECTION INTRAMUSCULAR; INTRAVENOUS at 20:59

## 2024-05-07 RX ADMIN — PROPOFOL 200 MG: 10 INJECTION, EMULSION INTRAVENOUS at 19:49

## 2024-05-07 RX ADMIN — SODIUM CHLORIDE, POTASSIUM CHLORIDE, SODIUM LACTATE AND CALCIUM CHLORIDE: 600; 310; 30; 20 INJECTION, SOLUTION INTRAVENOUS at 19:47

## 2024-05-07 RX ADMIN — GLYCOPYRROLATE 0.4 MG: 0.2 INJECTION INTRAMUSCULAR; INTRAVENOUS at 20:59

## 2024-05-07 RX ADMIN — FENTANYL CITRATE 100 MCG: 50 INJECTION, SOLUTION INTRAMUSCULAR; INTRAVENOUS at 19:49

## 2024-05-07 RX ADMIN — IOPAMIDOL 100 ML: 755 INJECTION, SOLUTION INTRAVENOUS at 16:01

## 2024-05-07 RX ADMIN — OXYCODONE HYDROCHLORIDE AND ACETAMINOPHEN 1 TABLET: 10; 325 TABLET ORAL at 21:38

## 2024-05-07 RX ADMIN — LORAZEPAM 0.25 MG: 2 INJECTION, SOLUTION INTRAMUSCULAR; INTRAVENOUS at 18:14

## 2024-05-07 RX ADMIN — LIDOCAINE HYDROCHLORIDE 60 MG: 20 INJECTION, SOLUTION INTRAVENOUS at 19:49

## 2024-05-07 RX ADMIN — Medication 3 MG: at 20:59

## 2024-05-07 RX ADMIN — DROPERIDOL 0.62 MG: 2.5 INJECTION, SOLUTION INTRAMUSCULAR; INTRAVENOUS at 21:39

## 2024-05-07 NOTE — ED PROVIDER NOTES
"Subjective   History of Present Illness  Patient is a 43-year-old female that presents to the emergency department sent from primary care provider's office for a sudden onset of severe pain and paresthesias to the left foot radiating up into left upper thigh that woke her from her sleep this morning.  Patient states the discomfort was similar to \"a severe case of her foot being asleep\".  She describes the discomfort as \"pins-and-needles beginning in the left great toe, left foot, and radiating up into the left thigh.  Patient reports she tried Tylenol and Motrin administration with no relief in symptoms.  She denies any recent fall or injury.  She states that her entire left lower extremity feels very \"heavy \"upon standing.  She is able to ambulate but reports increased discomfort with ambulation.  Patient reports she follow-up with her primary care provider today regarding this and had a vascular study performed to the left lower extremity that revealed no evidence of DVT.  Patient reports due to \" left foot being cold and continued pain patient was instructed to report to the ED for further evaluation and a CT angio of the left lower extremity.  Patient reports she is\" a daily tobacco user but denies any hormone use.  Denies any recent travel or anticoagulation use.  Denies any fevers, body aches, chills, cough, or congestion.  Denies any chest pain, shortness of breath, abdominal pain, nausea, vomiting, constipation, or diarrhea.      Review of Systems   Musculoskeletal:  Positive for arthralgias, gait problem and myalgias.   Neurological:  Positive for numbness.   All other systems reviewed and are negative.      Past Medical History:   Diagnosis Date    Graves disease     Kidney stone     SVT (supraventricular tachycardia)        No Known Allergies    Past Surgical History:   Procedure Laterality Date    APPENDECTOMY       SECTION      ENDOSCOPY      EXTRACORPOREAL SHOCKWAVE LITHOTRIPSY (ESWL), STENT " INSERTION/REMOVAL Right 3/23/2022    Procedure: EXTRACORPOREAL SHOCKWAVE LITHOTRIPSY  RIGHT CYSTO STENT PLACEMENT;  Surgeon: Michele Chavez MD;  Location: [facility] OR;  Service: Urology;  Laterality: Right;    TUBAL ABDOMINAL LIGATION         Family History   Problem Relation Age of Onset    Breast cancer Paternal Aunt        Social History     Socioeconomic History    Marital status:    Tobacco Use    Smoking status: Some Days     Current packs/day: 1.00     Average packs/day: 1 pack/day for 13.0 years (13.0 ttl pk-yrs)     Types: Cigarettes    Smokeless tobacco: Never   Vaping Use    Vaping status: Never Used   Substance and Sexual Activity    Alcohol use: Yes     Comment: recreational    Drug use: Never    Sexual activity: Defer           Objective   Physical Exam  Vitals and nursing note reviewed.   Constitutional:       Appearance: Normal appearance.      Comments: Nontoxic appearing. In no acute distress.    HENT:      Head: Normocephalic and atraumatic.      Right Ear: External ear normal.      Left Ear: External ear normal.      Nose: Nose normal.      Mouth/Throat:      Mouth: Mucous membranes are moist.      Pharynx: Oropharynx is clear.   Eyes:      Extraocular Movements: Extraocular movements intact.      Conjunctiva/sclera: Conjunctivae normal.      Pupils: Pupils are equal, round, and reactive to light.   Cardiovascular:      Rate and Rhythm: Normal rate and regular rhythm.      Pulses: Normal pulses.      Heart sounds: Normal heart sounds.   Pulmonary:      Effort: Pulmonary effort is normal. No respiratory distress.      Breath sounds: Normal breath sounds. No wheezing.   Chest:      Chest wall: No tenderness.   Abdominal:      General: Abdomen is flat. Bowel sounds are normal. There is no distension.      Palpations: Abdomen is soft.      Tenderness: There is no abdominal tenderness. There is no right CVA tenderness, left CVA tenderness, guarding or rebound.   Musculoskeletal:          General: Tenderness present. Normal range of motion.      Cervical back: Normal range of motion and neck supple.      Right lower leg: Normal. No edema.      Left lower leg: Tenderness present. No edema.      Right ankle: Normal.      Right Achilles Tendon: Normal.      Left ankle: Tenderness present.      Right foot: Normal.      Left foot: Normal capillary refill. Tenderness present. Normal pulse.      Comments: Left lower extremity is cooler to touch.  Dorsalis pedis pulses are palpable and were dopplered.  Both are strong and equal bilaterally.  No obvious swelling or deformity noted upon exam.  Patient does have some slight discoloration noted to the left great toe and the base of all 5 digits on the left foot.  She is able to ambulate without difficulty but does report increased discomfort with ambulation.   Skin:     General: Skin is warm and dry.      Capillary Refill: Capillary refill takes less than 2 seconds.   Neurological:      General: No focal deficit present.      Mental Status: She is alert and oriented to person, place, and time. Mental status is at baseline.   Psychiatric:         Mood and Affect: Mood normal.         Behavior: Behavior normal.         Thought Content: Thought content normal.         Judgment: Judgment normal.       Labs Reviewed   COMPREHENSIVE METABOLIC PANEL - Abnormal; Notable for the following components:       Result Value    Glucose 107 (*)     All other components within normal limits    Narrative:     GFR Normal >60  Chronic Kidney Disease <60  Kidney Failure <15     APTT - Abnormal; Notable for the following components:    PTT 22.1 (*)     All other components within normal limits   CBC WITH AUTO DIFFERENTIAL - Abnormal; Notable for the following components:    RBC 3.09 (*)     Hemoglobin 9.1 (*)     Hematocrit 28.5 (*)     Lymphocyte % 18.9 (*)     Immature Grans % 1.0 (*)     Immature Grans, Absolute 0.09 (*)     All other components within normal limits   PROTIME-INR  - Normal   URIC ACID - Normal   ANTITHROMBIN III - Normal   REMI   LUPUS ANTICOAGULANT   FACTOR 5 LEIDEN   ANTICARDIOLIPIN AB, IGG/M, QN   PROTEIN C ANTIGEN, TOTAL   PROTEIN S ANTIGEN, TOTAL   FACTOR II, DNA ANALYSIS   CBC AND DIFFERENTIAL    Narrative:     The following orders were created for panel order CBC & Differential.  Procedure                               Abnormality         Status                     ---------                               -----------         ------                     CBC Auto Differential[[id number]]        Abnormal            Final result                 Please view results for these tests on the individual orders.      XR Tibia Fibula 2 View Left   Final Result       1.  No acute osseous abnormality in the LEFT lower leg.       This report was signed and finalized on 5/7/2024 3:59 PM by Dr. Gerson Birch MD.          XR Ankle 3+ View Left   Final Result       1.  No acute osseous abnormality in the LEFT ankle.       This report was signed and finalized on 5/7/2024 4:01 PM by Dr. Gerson Birch MD.          XR Foot 3+ View Left   Final Result       1.  Incidental note of fragmented os peroneus. No definite acute osseous   abnormality in the LEFT foot.       This report was signed and finalized on 5/7/2024 4:00 PM by Dr. Gerson Birch MD.          CT Angio Abdominal Aorta Bilateral Iliofem Runoff   Final Result   1. There appears to be intraluminal thrombus with abrupt vessel cut off   in the left below-the-knee popliteal artery segment with occlusion of   the proximal portions of the anterior tibial, posterior tibial and   peroneal arteries. All 3 of these vessels eventually reconstitute and   are faintly visible in the distal calf and at the level of the ankle.       Findings and recommendations were discussed with PRIYANK WESLEY JR on   5/7/2024 4:35 PM.           This report was signed and finalized on 5/7/2024 4:41 PM by Dr Abraham Riddle.               Procedures      "      ED Course  ED Course as of 05/07/24 1748   Tue May 07, 2024   1645 Vascular surgeon paged at this time regarding CT imaging results.  [KF]   1702 Spoke with Dr. Turcios, vascular surgeon on-call.  He states that patient will need to remain n.p.o., admit to the hospitalist and administer heparin bolus and initiate heparin drip as patient will possibly need thrombectomy. [KF]   1719 Spoke with hospitalist on call, Dr. Pereira regarding need for admission who is in agreement with admitting patient.  [KF]      ED Course User Index  [KF] Aliza Iniguez, APRN                                             Medical Decision Making  Denise Ahn is a 43 y.o. female who presents to the ED sent from primary care provider's office for a sudden onset of severe pain and paresthesias to the left foot radiating up into left upper thigh that woke her from her sleep this morning.  Patient states the discomfort was similar to \"a severe case of her foot being asleep\".  She describes the discomfort as \"pins-and-needles beginning in the left great toe, left foot, and radiating up into the left thigh.  Patient reports she tried Tylenol and Motrin administration with no relief in symptoms.  She denies any recent fall or injury.  She states that her entire left lower extremity feels very \"heavy \"upon standing.  She is able to ambulate but reports increased discomfort with ambulation.  Patient reports she follow-up with her primary care provider today regarding this and had a vascular study performed to the left lower extremity that revealed no evidence of DVT.  Patient reports due to \" left foot being cold and continued pain patient was instructed to report to the ED for further evaluation and a CT angio of the left lower extremity.  Patient reports she is\" a daily tobacco user but denies any hormone use.  Denies any recent travel or anticoagulation use.  Denies any fevers, body aches, chills, cough, or congestion.  Denies any " chest pain, shortness of breath, abdominal pain, nausea, vomiting, constipation, or diarrhea.    Patient was non-toxic appearing on arrival. No acute distress was noted.  Vital signs stable.    Past medical history, surgical history, and medication regimen reviewed.     Previous notes, labs, imaging, and more reviewed.    Patient's presentation raises suspicion for differentials including, but not limited to, gout, neuropathy, DVT, arterial occlusion.    Please refer to above section of note for lab and imaging results that were reviewed and interpreted by radiology as well as attending physician.     Patient was administered a heparin bolus and initiated heparin drip while in the ED.    Spoke with vascular surgeon on-call, Dr. Turcios who recommends heparin bolus and infusion, n.p.o. and admit to the hospitalist.  States that patient will probably need for thrombectomy and he will evaluate.    Spoke with hospitalist on-call, Dr. Pereira regarding admission to the hospital in which she has accepted patient for admission.    Given findings described above, patient's presentation is likely consistent with intraluminal thrombus with occlusion of proximal portions of anterior tibial, posterior tibial, and peroneal arteries. I have a low suspicion for fracture or dislocation at this point in their ED course.      I reassessed the patient and discussed the findings of the work up so far with patient. I said that the next step in treatment would be admission to the hospital for further workup and care. I also said that there is always some diagnostic uncertainty in the ER, that symptoms may change, and new things may be found after being admitted. The hospitalist service assumed primary care of the patient and the patient was admitted to their service in stable condition.    Dragon disclaimer:  Parts of this note may be an electronic transcription/translation of spoken language to printed text using the Dragon  dictation system.       Amount and/or Complexity of Data Reviewed  Labs: ordered.  Radiology: ordered.    Risk  Prescription drug management.  Decision regarding hospitalization.        Final diagnoses:   Arterial occlusion, lower extremity   Left foot pain       ED Disposition  ED Disposition       ED Disposition   Decision to Admit    Condition   --    Comment   Level of Care: Med/Surg [1]   Diagnosis: Arterial occlusion, lower extremity [5400554]   Admitting Physician: NADIA MELENDEZ [[id number]]   Attending Physician: NADIA MELENDEZ [[id number]]                 No follow-up provider specified.       Medication List      No changes were made to your prescriptions during this visit.            Aliza Iniguez, APRN  05/07/24 5224

## 2024-05-07 NOTE — H&P
[facility] Medicine Services  HISTORY AND PHYSICAL    Date of Admission: 5/7/2024  Primary Care Physician: Lela Boyle APRN    Subjective   Primary Historian: Patient    Chief Complaint: Acute paresthesias, pins-and-needles and pain left lower extremity  History of Present Illness  It is a 44-year-old woman who carries history of supraventricular tachycardia.  She said that she has not had any issues with this for a while  She normally has her heart rate runs anywhere from 50-55 and around 90, she has near syncopal episode.  She takes atenolol.  From reviewing the current past medical history it also mention history of Graves' disease.  She did not readily share this information with me.  She came to the emergency room from urgent care because of concern for peripheral circulation issue.  She woke up around 2 AM with tingling, pins-and-needles and pain on her left lower extremity.  He underwent venous duplex ultrasound that ruled out presence of DVT.  Further diagnostic studies with CT angiogram of abdominal aorta bilateral femoral iliofemoral runoff showed intraluminal thrombus with a drop vessel in the left below the knee popliteal artery segment with occlusion of the proximal portions of the anterior tibial, posterior tibial and peroneal arteries.  All 3 of these vessels eventually reconstitutes and are faintly visible in the distal calf and the level of the ankle.    Dr. Turcios was consulted by CLARIBEL Abrams.   \He recommends to place patient n.p.o. and placed on heparin drip    Patient states she just finished her menstrual period 3 days ago.  She her tubes have been tied for 4 years now    She has heavy menstrual period she is followed by gynecologist in outpatient at [facility]  She has known fibroid    She told me she has SVT and this has not bothered her for a while.  There is no family history of blood clot or hyper coagulability  She is not on  any oral contraceptive medication  She has no recent long distance travel    Review of Systems   Positive intentional weight loss   No reported bleeding history other than menorrhagia  No hoarseness of voice  No shortness of breath or difficulty breathing  No cough no or chills  Nausea or vomiting or abdominal pain  No reported lumps/masses  Otherwise complete ROS reviewed and negative except as mentioned in the HPI.    Past Medical History:   Past Medical History:   Diagnosis Date    Graves disease     Kidney stone     SVT (supraventricular tachycardia)      Past Surgical History:  Past Surgical History:   Procedure Laterality Date    APPENDECTOMY       SECTION      ENDOSCOPY      EXTRACORPOREAL SHOCKWAVE LITHOTRIPSY (ESWL), STENT INSERTION/REMOVAL Right 3/23/2022    Procedure: EXTRACORPOREAL SHOCKWAVE LITHOTRIPSY  RIGHT CYSTO STENT PLACEMENT;  Surgeon: Michele Chavez MD;  Location: [facility];  Service: Urology;  Laterality: Right;    TUBAL ABDOMINAL LIGATION       Social History:  reports that she has been smoking cigarettes. She has a 13 pack-year smoking history. She has never used smokeless tobacco. She reports current alcohol use. She reports that she does not use drugs.    Family History: family history includes Breast cancer in her paternal aunt.     Other past away last year while father passed away after 6 months.  If I remember it correctly mother passed away with stroke but father had heart disease.    Allergies:  No Known Allergies    Medications:  Prior to Admission medications    Medication Sig Start Date End Date Taking? Authorizing Provider   atenolol (TENORMIN) 25 MG tablet Take 1 tablet by mouth 2 (Two) Times a Day. 3/6/24   Lela Boyle APRN   Multiple Vitamins-Minerals (b complex-C-E-zinc) tablet Take 1 tablet by mouth Daily.    ProviderAlexia MD   multivitamin with minerals tablet tablet Take 1 tablet by mouth Daily.    Alexia Warren MD   potassium chloride  "(KLOR-CON) 20 MEQ packet Take 20 mEq by mouth 2 (Two) Times a Day. 2/26/24   Dolores Shipman APRN     I have utilized all available immediate resources to obtain, update, or review the patient's current medications (including all prescriptions, over-the-counter products, herbals, cannabis/cannabidiol products, and vitamin/mineral/dietary (nutritional) supplements).    Objective     Vital Signs: /89   Pulse 83   Temp 98.5 °F (36.9 °C)   Resp 19   Ht 177.8 cm (70\")   Wt 97.5 kg (215 lb)   SpO2 99%   BMI 30.85 kg/m²   Physical Exam   GEN: Awake, alert, interactive, in NAD  HEENT: Atraumatic, PERRLA, EOMI, Anicteric, Trachea midline  Lungs: CTAB, no wheezing/rales/rhonchi  Heart: RRR, +S1/s2, no rub  ABD: soft, nt/nd, +BS, no guarding/rebound  Extremities: atraumatic, no cyanosis, no edema could not palpate her dorsalis pedis pulse although patient has dopplerable pulses according to CLARIBEL Abrams  Skin: no rashes or lesions  Neuro: AAOx3, no focal deficits  Psych: normal mood & affect      Results Reviewed:  Lab Results (last 24 hours)       Procedure Component Value Units Date/Time    Protime-INR [[id number]]  (Normal) Collected: 05/07/24 1639    Specimen: Blood from Arm, Left Updated: 05/07/24 1658     Protime 13.4 Seconds      INR 0.98    aPTT [[id number]]  (Abnormal) Collected: 05/07/24 1639    Specimen: Blood from Arm, Left Updated: 05/07/24 1658     PTT 22.1 seconds     Uric Acid [[id number]]  (Normal) Collected: 05/07/24 1551    Specimen: Blood from Arm, Left Updated: 05/07/24 1626     Uric Acid 4.0 mg/dL     Comprehensive Metabolic Panel [[id number]]  (Abnormal) Collected: 05/07/24 1551    Specimen: Blood from Arm, Left Updated: 05/07/24 1624     Glucose 107 mg/dL      BUN 12 mg/dL      Creatinine 0.70 mg/dL      Sodium 141 mmol/L      Potassium 3.9 mmol/L      Comment: Slight hemolysis detected by analyzer. Result may be falsely elevated.        Chloride 107 mmol/L      CO2 23.0 mmol/L      Calcium 9.3 " mg/dL      Total Protein 6.9 g/dL      Albumin 4.3 g/dL      ALT (SGPT) 20 U/L      AST (SGOT) 20 U/L      Alkaline Phosphatase 54 U/L      Total Bilirubin 0.2 mg/dL      Globulin 2.6 gm/dL      A/G Ratio 1.7 g/dL      BUN/Creatinine Ratio 17.1     Anion Gap 11.0 mmol/L      eGFR 111.6 mL/min/1.73     Narrative:      GFR Normal >60  Chronic Kidney Disease <60  Kidney Failure <15      CBC & Differential [[id number]]  (Abnormal) Collected: 05/07/24 1551    Specimen: Blood from Arm, Left Updated: 05/07/24 1607    Narrative:      The following orders were created for panel order CBC & Differential.  Procedure                               Abnormality         Status                     ---------                               -----------         ------                     CBC Auto Differential[[id number]]        Abnormal            Final result                 Please view results for these tests on the individual orders.    CBC Auto Differential [[id number]]  (Abnormal) Collected: 05/07/24 1551    Specimen: Blood from Arm, Left Updated: 05/07/24 1607     WBC 9.22 10*3/mm3      RBC 3.09 10*6/mm3      Hemoglobin 9.1 g/dL      Hematocrit 28.5 %      MCV 92.2 fL      MCH 29.4 pg      MCHC 31.9 g/dL      RDW 13.7 %      RDW-SD 45.7 fl      MPV 9.3 fL      Platelets 309 10*3/mm3      Neutrophil % 73.0 %      Lymphocyte % 18.9 %      Monocyte % 5.7 %      Eosinophil % 1.1 %      Basophil % 0.3 %      Immature Grans % 1.0 %      Neutrophils, Absolute 6.73 10*3/mm3      Lymphocytes, Absolute 1.74 10*3/mm3      Monocytes, Absolute 0.53 10*3/mm3      Eosinophils, Absolute 0.10 10*3/mm3      Basophils, Absolute 0.03 10*3/mm3      Immature Grans, Absolute 0.09 10*3/mm3      nRBC 0.2 /100 WBC           Imaging Results (Last 24 Hours)       Procedure Component Value Units Date/Time    CT Angio Abdominal Aorta Bilateral Iliofem Runoff [[id number]] Collected: 05/07/24 1626     Updated: 05/07/24 1644    Narrative:      CT ANGIO ABDOMINAL AORTA  BILAT ILIOFEM RUNOFF- 5/7/2024 2:56 PM     HISTORY: angio of LLE with runoff due to normal venous study, cold foot,  pain and paresthesias, sent from PCP     COMPARISON: Abdominal CT dated 3/20/2022     DOSE LENGTH PRODUCT: 1261.6 mGy.cm. Automated exposure control was also  utilized to decrease patient radiation dose.     TECHNIQUE: Helical tomographic images of the abdomen, pelvis and  bilateral lower extremities utilizing angiographic protocol were  obtained following the intravenous infusion of contrast. Multiplanar and  3 D reformatted images were provided for review.     FINDINGS:     ANGIOGRAM:     Abdominal aorta is normal in caliber. No dissection or flow-limiting  stenosis. No flow-limiting stenosis at the origin of the celiac or  superior mesenteric arteries. RACHEL is patent. No flow-limiting stenosis  at the renal artery origins.     Common, internal and external iliac arteries are patent and normal in  caliber. Common femoral arteries are patent and normal in caliber.     Deep and superficial femoral arteries on the right are patent and normal  in caliber. The popliteal artery is patent and normal in caliber.  Anterior tibial, posterior tibial and peroneal arteries are patent and  normal in caliber with three-vessel runoff to the right ankle.     Deep and superficial femoral arteries on the left are patent and normal  in caliber. Above the knee and behind the knee segments of the popliteal  artery are unremarkable. There is an abrupt cut off of the below the  knee popliteal artery just before the level of the anterior tibial  artery (series 4-images 472 through 474). The anterior tibial artery  origin is occluded although there is a quick reconstitution of the  vessel. The same is true for the posterior tibial artery which  reconstitutes just beyond its origin. The peroneal artery reconstitutes  but much further down in the calf. All 3 vessels are visible at the  level of the ankle.     OTHER FINDINGS:      LOWER CHEST: The lung bases are clear. Included mediastinal structures  are unremarkable.     LIVER: Liver steatosis. No focal lesion. Portal veins are patent.     BILIARY SYSTEM: The gallbladder is unremarkable. No intrahepatic or  extrahepatic ductal dilatation.     PANCREAS: No focal pancreatic lesion identified.      SPLEEN: Unremarkable.     KIDNEYS AND ADRENALS: Adrenal glands are unremarkable. Kidneys are  unremarkable. The ureters are decompressed and normal in appearance.     RETROPERITONEUM: No mass, lymphadenopathy or hemorrhage.     GI TRACT: The stomach is nondistended. Small bowel loops are nondilated.   No inflammatory changes are seen along the colon. Prior appendectomy.     OTHER: There is no mesenteric mass, lymphadenopathy, free fluid or free  air. Abdominal venous structures are not yet opacified. No acute bony  abnormality seen.     PELVIS: There appears to be a 5.5 cm intramural uterine fibroid at the  fundus. No pelvic adenopathy. Urinary bladder is decompressed.       Impression:      1. There appears to be intraluminal thrombus with abrupt vessel cut off  in the left below-the-knee popliteal artery segment with occlusion of  the proximal portions of the anterior tibial, posterior tibial and  peroneal arteries. All 3 of these vessels eventually reconstitute and  are faintly visible in the distal calf and at the level of the ankle.     Findings and recommendations were discussed with PRIYANK WESLEY JR on  5/7/2024 4:35 PM.        This report was signed and finalized on 5/7/2024 4:41 PM by Dr Abraham Riddle.       XR Ankle 3+ View Left [[id number]] Collected: 05/07/24 1601     Updated: 05/07/24 1604    Narrative:      EXAMINATION: XR ANKLE 3+ VW LEFT-  5/7/2024 4:01 PM     HISTORY: New onset pain and paresthesias     COMPARISON: None      TECHNIQUE:  Frontal, lateral and oblique radiographs of the left ankle were provided  for review. 3 views     FINDINGS:  There is no evidence of acute  fracture or dislocation. The soft tissues  are normal in appearance. The joint spaces are maintained.          Impression:         1.  No acute osseous abnormality in the LEFT ankle.     This report was signed and finalized on 5/7/2024 4:01 PM by Dr. Gerson Birch MD.       XR Tibia Fibula 2 View Left [[id number]] Collected: 05/07/24 1559     Updated: 05/07/24 1604    Narrative:      EXAMINATION: XR TIBIA FIBULA 2 VW LEFT-  5/7/2024 3:59 PM      HISTORY: New onset pain and paresthesias to the left lower extremity     COMPARISON: NONE     FINDINGS:  Frontal and lateral radiographs of the LEFT lower leg were obtained.     There is no evidence of fracture. Limited evaluation of the knee and  ankle joints is unremarkable. The soft tissues are grossly unremarkable.  Incidental note of mild quadriceps enthesopathy at the patellar  insertion.          Impression:         1.  No acute osseous abnormality in the LEFT lower leg.     This report was signed and finalized on 5/7/2024 3:59 PM by Dr. Gerson Birch MD.       XR Foot 3+ View Left [[id number]] Collected: 05/07/24 1559     Updated: 05/07/24 1603    Narrative:      EXAMINATION: XR FOOT 3+ VW LEFT-  5/7/2024 3:59 PM     HISTORY: new onset pain and paresthesias     COMPARISON: None      TECHNIQUE:  Frontal, lateral and oblique radiographs of the LEFT foot were provided  for review.     FINDINGS:  Incidental note of fragmented os peroneus. No acute fracture identified.  Normal alignment. No destructive bone lesion. Joint spaces are  relatively well-preserved. Surrounding soft tissues demonstrate no acute  findings.          Impression:         1.  Incidental note of fragmented os peroneus. No definite acute osseous  abnormality in the LEFT foot.     This report was signed and finalized on 5/7/2024 4:00 PM by Dr. Gerson Birch MD.             I have personally reviewed and interpreted the radiology studies and ECG obtained at time of admission.     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  History of SVT  Normocytic anemia in patients with history of neuralgia  Uterine fibroid  Tobacco abuse-counseled on smoking cessation.  Will offer nicotine patch    Treatment Plan  The patient will be admitted to my service here at [facility].   N.p.o.  Heparin drip per protocol  Dr. Turcios informed  Hypercoagulable workup    I will also request telemetry and echocardiogram given history of SVT and arterial thrombosis.  Requested for EKG.  She may need Holter in outpatient setting    I placed her on as needed antianxiety medication.  Patient NPO.  I will place her on IV 0.25 mg Ativan as needed every 12 hours.  I counseled her on this  Conditions and Status       Fair  MDM Data  External documents reviewed: -  Cardiac tracing (EKG, telemetry) interpretation: Nothing available at this time  Radiology interpretation: Reviewed imaging studies including x-ray, venous ultrasound and CT angiogram normal aorta with runoff  Labs reviewed: y  Any tests that were considered but not ordered: None     Decision rules/scores evaluated (example [id number], Wells, etc): None     Discussed with: Patient and NP Aliza     Care Planning  Shared decision making: Patient and ER practitioner with vascular surgeon  Code status and discussions: Full code    Disposition  Social Determinants of Health that impact treatment or disposition: None identified at this time  Estimated length of stay is to be determined.     I confirmed that the patient's advanced care plan is present, code status is documented, and a surrogate decision maker is listed in the patient's medical record.     The patient's  surrogate decision maker is family/significant other    The patient was seen and examined by me on   Electronically signed by Vince Pereira MD, 05/07/24, 17:27 CDT.

## 2024-05-08 ENCOUNTER — APPOINTMENT (OUTPATIENT)
Dept: CARDIOLOGY | Facility: HOSPITAL | Age: 44
End: 2024-05-08
Payer: MEDICAID

## 2024-05-08 LAB
APTT PPP: 106.9 SECONDS (ref 24.5–36)
APTT PPP: 23.4 SECONDS (ref 24.5–36)
APTT PPP: 62.1 SECONDS (ref 24.5–36)
BASOPHILS # BLD AUTO: 0.03 10*3/MM3 (ref 0–0.2)
BASOPHILS NFR BLD AUTO: 0.3 % (ref 0–1.5)
BH CV ECHO LEFT VENTRICLE GLOBAL LONGITUDINAL STRAIN: -19.9 %
BH CV ECHO MEAS - AO MAX PG: 11.2 MMHG
BH CV ECHO MEAS - AO MEAN PG: 5.6 MMHG
BH CV ECHO MEAS - AO ROOT DIAM: 3 CM
BH CV ECHO MEAS - AO V2 MAX: 167.5 CM/SEC
BH CV ECHO MEAS - AO V2 VTI: 38.4 CM
BH CV ECHO MEAS - AVA(I,D): 3 CM2
BH CV ECHO MEAS - EDV(CUBED): 117.5 ML
BH CV ECHO MEAS - EDV(MOD-SP2): 109.5 ML
BH CV ECHO MEAS - EDV(MOD-SP4): 78.5 ML
BH CV ECHO MEAS - EF(MOD-SP2): 71.3 %
BH CV ECHO MEAS - EF(MOD-SP4): 59.7 %
BH CV ECHO MEAS - ESV(CUBED): 41.5 ML
BH CV ECHO MEAS - ESV(MOD-SP2): 31.4 ML
BH CV ECHO MEAS - ESV(MOD-SP4): 31.7 ML
BH CV ECHO MEAS - FS: 29.3 %
BH CV ECHO MEAS - IVS/LVPW: 0.92 CM
BH CV ECHO MEAS - IVSD: 1.18 CM
BH CV ECHO MEAS - LA DIMENSION: 4.2 CM
BH CV ECHO MEAS - LV DIASTOLIC VOL/BSA (35-75): 36.1 CM2
BH CV ECHO MEAS - LV MASS(C)D: 232.7 GRAMS
BH CV ECHO MEAS - LV MAX PG: 6.5 MMHG
BH CV ECHO MEAS - LV MEAN PG: 3.7 MMHG
BH CV ECHO MEAS - LV SYSTOLIC VOL/BSA (12-30): 14.5 CM2
BH CV ECHO MEAS - LV V1 MAX: 127.2 CM/SEC
BH CV ECHO MEAS - LV V1 VTI: 29.2 CM
BH CV ECHO MEAS - LVIDD: 4.9 CM
BH CV ECHO MEAS - LVIDS: 3.5 CM
BH CV ECHO MEAS - LVOT AREA: 4 CM2
BH CV ECHO MEAS - LVOT DIAM: 2.25 CM
BH CV ECHO MEAS - LVPWD: 1.27 CM
BH CV ECHO MEAS - MR MAX PG: 144.5 MMHG
BH CV ECHO MEAS - MR MAX VEL: 601.1 CM/SEC
BH CV ECHO MEAS - MV A MAX VEL: 81.6 CM/SEC
BH CV ECHO MEAS - MV DEC SLOPE: 259.3 CM/SEC2
BH CV ECHO MEAS - MV DEC TIME: 0.39 SEC
BH CV ECHO MEAS - MV E MAX VEL: 100.7 CM/SEC
BH CV ECHO MEAS - MV E/A: 1.23
BH CV ECHO MEAS - MV MAX PG: 6 MMHG
BH CV ECHO MEAS - MV MEAN PG: 2.27 MMHG
BH CV ECHO MEAS - MV V2 VTI: 29.7 CM
BH CV ECHO MEAS - MVA(VTI): 3.9 CM2
BH CV ECHO MEAS - PA V2 MAX: 109.7 CM/SEC
BH CV ECHO MEAS - RV MAX PG: 2.03 MMHG
BH CV ECHO MEAS - RV V1 MAX: 71.3 CM/SEC
BH CV ECHO MEAS - RV V1 VTI: 20.7 CM
BH CV ECHO MEAS - RVDD: 3.2 CM
BH CV ECHO MEAS - SV(LVOT): 115.6 ML
BH CV ECHO MEAS - SV(MOD-SP2): 78.1 ML
BH CV ECHO MEAS - SV(MOD-SP4): 46.9 ML
BH CV ECHO MEAS - SVI(LVOT): 53.1 ML/M2
BH CV ECHO MEAS - SVI(MOD-SP2): 35.9 ML/M2
BH CV ECHO MEAS - SVI(MOD-SP4): 21.5 ML/M2
BH CV XLRA - RV BASE: 3 CM
DEPRECATED RDW RBC AUTO: 45.9 FL (ref 37–54)
EOSINOPHIL # BLD AUTO: 0.16 10*3/MM3 (ref 0–0.4)
EOSINOPHIL NFR BLD AUTO: 1.7 % (ref 0.3–6.2)
ERYTHROCYTE [DISTWIDTH] IN BLOOD BY AUTOMATED COUNT: 13.6 % (ref 12.3–15.4)
HCT VFR BLD AUTO: 27.3 % (ref 34–46.6)
HGB BLD-MCNC: 8.4 G/DL (ref 12–15.9)
IMM GRANULOCYTES # BLD AUTO: 0.08 10*3/MM3 (ref 0–0.05)
IMM GRANULOCYTES NFR BLD AUTO: 0.9 % (ref 0–0.5)
LEFT ATRIUM VOLUME INDEX: 34 ML/M2
LEFT ATRIUM VOLUME: 71 ML
LYMPHOCYTES # BLD AUTO: 2.34 10*3/MM3 (ref 0.7–3.1)
LYMPHOCYTES NFR BLD AUTO: 24.9 % (ref 19.6–45.3)
MCH RBC QN AUTO: 28.7 PG (ref 26.6–33)
MCHC RBC AUTO-ENTMCNC: 30.8 G/DL (ref 31.5–35.7)
MCV RBC AUTO: 93.2 FL (ref 79–97)
MONOCYTES # BLD AUTO: 0.52 10*3/MM3 (ref 0.1–0.9)
MONOCYTES NFR BLD AUTO: 5.5 % (ref 5–12)
NEUTROPHILS NFR BLD AUTO: 6.25 10*3/MM3 (ref 1.7–7)
NEUTROPHILS NFR BLD AUTO: 66.7 % (ref 42.7–76)
NRBC BLD AUTO-RTO: 0 /100 WBC (ref 0–0.2)
PLATELET # BLD AUTO: 268 10*3/MM3 (ref 140–450)
PMV BLD AUTO: 8.9 FL (ref 6–12)
RBC # BLD AUTO: 2.93 10*6/MM3 (ref 3.77–5.28)
WBC NRBC COR # BLD AUTO: 9.38 10*3/MM3 (ref 3.4–10.8)

## 2024-05-08 PROCEDURE — 85732 THROMBOPLASTIN TIME PARTIAL: CPT | Performed by: SURGERY

## 2024-05-08 PROCEDURE — 25010000002 HYDROMORPHONE PER 4 MG: Performed by: SURGERY

## 2024-05-08 PROCEDURE — 85613 RUSSELL VIPER VENOM DILUTED: CPT | Performed by: SURGERY

## 2024-05-08 PROCEDURE — 93306 TTE W/DOPPLER COMPLETE: CPT | Performed by: HOSPITALIST

## 2024-05-08 PROCEDURE — 81240 F2 GENE: CPT | Performed by: SURGERY

## 2024-05-08 PROCEDURE — 85025 COMPLETE CBC W/AUTO DIFF WBC: CPT

## 2024-05-08 PROCEDURE — 86038 ANTINUCLEAR ANTIBODIES: CPT | Performed by: SURGERY

## 2024-05-08 PROCEDURE — G0378 HOSPITAL OBSERVATION PER HR: HCPCS

## 2024-05-08 PROCEDURE — 93306 TTE W/DOPPLER COMPLETE: CPT

## 2024-05-08 PROCEDURE — 25010000002 ONDANSETRON PER 1 MG: Performed by: INTERNAL MEDICINE

## 2024-05-08 PROCEDURE — 86147 CARDIOLIPIN ANTIBODY EA IG: CPT | Performed by: SURGERY

## 2024-05-08 PROCEDURE — 85670 THROMBIN TIME PLASMA: CPT | Performed by: SURGERY

## 2024-05-08 PROCEDURE — 85305 CLOT INHIBIT PROT S TOTAL: CPT | Performed by: SURGERY

## 2024-05-08 PROCEDURE — 85730 THROMBOPLASTIN TIME PARTIAL: CPT | Performed by: SURGERY

## 2024-05-08 PROCEDURE — 93356 MYOCRD STRAIN IMG SPCKL TRCK: CPT

## 2024-05-08 PROCEDURE — 93356 MYOCRD STRAIN IMG SPCKL TRCK: CPT | Performed by: HOSPITALIST

## 2024-05-08 PROCEDURE — 25010000002 HEPARIN (PORCINE) PER 1000 UNITS: Performed by: SURGERY

## 2024-05-08 PROCEDURE — 85302 CLOT INHIBIT PROT C ANTIGEN: CPT | Performed by: SURGERY

## 2024-05-08 PROCEDURE — 81241 F5 GENE: CPT | Performed by: SURGERY

## 2024-05-08 PROCEDURE — 25510000001 PERFLUTREN 6.52 MG/ML SUSPENSION: Performed by: INTERNAL MEDICINE

## 2024-05-08 PROCEDURE — 36415 COLL VENOUS BLD VENIPUNCTURE: CPT | Performed by: SURGERY

## 2024-05-08 PROCEDURE — 63710000001 ONDANSETRON ODT 4 MG TABLET DISPERSIBLE: Performed by: INTERNAL MEDICINE

## 2024-05-08 PROCEDURE — 85705 THROMBOPLASTIN INHIBITION: CPT | Performed by: SURGERY

## 2024-05-08 RX ORDER — ONDANSETRON 2 MG/ML
4 INJECTION INTRAMUSCULAR; INTRAVENOUS EVERY 6 HOURS PRN
Status: DISCONTINUED | OUTPATIENT
Start: 2024-05-08 | End: 2024-05-09 | Stop reason: HOSPADM

## 2024-05-08 RX ORDER — FENOFIBRATE 145 MG/1
145 TABLET, COATED ORAL DAILY
COMMUNITY

## 2024-05-08 RX ORDER — ONDANSETRON 4 MG/1
4 TABLET, ORALLY DISINTEGRATING ORAL EVERY 6 HOURS PRN
Status: DISCONTINUED | OUTPATIENT
Start: 2024-05-08 | End: 2024-05-09 | Stop reason: HOSPADM

## 2024-05-08 RX ORDER — ONDANSETRON 2 MG/ML
4 INJECTION INTRAMUSCULAR; INTRAVENOUS EVERY 6 HOURS PRN
Status: DISCONTINUED | OUTPATIENT
Start: 2024-05-08 | End: 2024-05-08 | Stop reason: SDUPTHER

## 2024-05-08 RX ADMIN — OXYCODONE HYDROCHLORIDE AND ACETAMINOPHEN 1 TABLET: 5; 325 TABLET ORAL at 22:14

## 2024-05-08 RX ADMIN — ONDANSETRON 4 MG: 4 TABLET, ORALLY DISINTEGRATING ORAL at 22:14

## 2024-05-08 RX ADMIN — PERFLUTREN 13.04 MG: 6.52 INJECTION, SUSPENSION INTRAVENOUS at 13:36

## 2024-05-08 RX ADMIN — ATENOLOL 25 MG: 25 TABLET ORAL at 08:23

## 2024-05-08 RX ADMIN — ONDANSETRON 4 MG: 2 INJECTION INTRAMUSCULAR; INTRAVENOUS at 06:39

## 2024-05-08 RX ADMIN — HEPARIN SODIUM 3900 UNITS: 1000 INJECTION, SOLUTION INTRAVENOUS; SUBCUTANEOUS at 06:52

## 2024-05-08 RX ADMIN — ATENOLOL 25 MG: 25 TABLET ORAL at 20:31

## 2024-05-08 RX ADMIN — Medication 10 ML: at 08:59

## 2024-05-08 RX ADMIN — Medication 10 ML: at 20:34

## 2024-05-08 RX ADMIN — OXYCODONE HYDROCHLORIDE AND ACETAMINOPHEN 1 TABLET: 5; 325 TABLET ORAL at 15:02

## 2024-05-08 RX ADMIN — APIXABAN 5 MG: 5 TABLET, FILM COATED ORAL at 20:31

## 2024-05-08 RX ADMIN — APIXABAN 5 MG: 5 TABLET, FILM COATED ORAL at 10:13

## 2024-05-08 RX ADMIN — HYDROMORPHONE HYDROCHLORIDE 0.5 MG: 1 INJECTION, SOLUTION INTRAMUSCULAR; INTRAVENOUS; SUBCUTANEOUS at 04:44

## 2024-05-08 RX ADMIN — ONDANSETRON 4 MG: 2 INJECTION INTRAMUSCULAR; INTRAVENOUS at 15:03

## 2024-05-08 NOTE — PLAN OF CARE
Problem: Adult Inpatient Plan of Care  Goal: Plan of Care Review  Outcome: Ongoing, Progressing  Flowsheets (Taken 2024 749)  Progress: no change  Plan of Care Reviewed With: patient  Outcome Evaluation: Transfer from PACU. VSS. AOx4. Bedrest  at 2320. IV R AC. Heparin drip. Tele running NSR. SCD. R femoral site sioft, no drainage. Family at bedside. DTV. Will continue to monitor. Safety maintained.

## 2024-05-08 NOTE — PLAN OF CARE
Goal Outcome Evaluation:           Progress: no change  Outcome Evaluation: Patient c/o pain x1. c/o of nausea x1. See Mar. Voiding. Dressing c/d/i.

## 2024-05-08 NOTE — PROGRESS NOTES
1         [facility] Services  INPATIENT PROGRESS NOTE    Patient Name: Denise Ahn  Date of Admission: 5/7/2024  Today's Date: 05/08/24  Length of Stay: 0  Primary Care Physician: Lela Boyle APRN    Subjective   Chief Complaint: /fu  HPI   Patient underwent aortogram.  Bilateral iliofemoral arteriogram, suction embolectomy/thrombectomy of left popliteal, anterior tibial, tibioperoneal trunk, peroneal and proximal posterior tibial artery yesterday.  Details of procedure as per Dr. Turcios operative report on May 7  Patient presented with left foot/leg pain, paresthesia during the early morning.  Patient's workup revealed presence of left popliteal intraluminal thrombus.    Patient expressed that she is feeling a lot better on this regard  Patient has headache  Feels nauseated  No vomiting  Afebrile  No bleeding episode    Review of Systems   All pertinent negatives and positives are as above. All other systems have been reviewed and are negative unless otherwise stated.     Objective    Temp:  [97.5 °F (36.4 °C)-98.5 °F (36.9 °C)] 97.5 °F (36.4 °C)  Heart Rate:  [62-91] 68  Resp:  [15-20] 16  BP: ()/(45-89) 102/46  Physical Exam  Patient is on heparin drip.   PTT 62.1  GEN: Awake, alert, interactive, in NAD  HEENT: Atraumatic, PERRLA, EOMI, Anicteric, Trachea midline  Lungs: CTAB, no wheezing/rales/rhonchi  Heart: RRR, +S1/s2, no rub  ABD: soft, nt/nd, +BS, no guarding/rebound  Extremities: atraumatic, no cyanosis, no edema   Skin: no rashes or lesions  Neuro: AAOx3, no focal deficits  Psych: normal mood & affect         Results Review:  I have reviewed the labs, radiology results, and diagnostic studies.    Laboratory Data:   Results from last 7 days   Lab Units 05/08/24  0602 05/07/24  1551   WBC 10*3/mm3 9.38 9.22   HEMOGLOBIN g/dL 8.4* 9.1*   HEMATOCRIT % 27.3* 28.5*   PLATELETS 10*3/mm3 268 309        Results from last 7 days   Lab Units 05/07/24  1551  "  SODIUM mmol/L 141   POTASSIUM mmol/L 3.9   CHLORIDE mmol/L 107   CO2 mmol/L 23.0   BUN mg/dL 12   CREATININE mg/dL 0.70   CALCIUM mg/dL 9.3   BILIRUBIN mg/dL 0.2   ALK PHOS U/L 54   ALT (SGPT) U/L 20   AST (SGOT) U/L 20   GLUCOSE mg/dL 107*       Culture Data:   No results found for: \"BLOODCX\", \"URINECX\", \"WOUNDCX\", \"MRSACX\", \"RESPCX\", \"STOOLCX\"    Radiology Data:   Imaging Results (Last 24 Hours)       Procedure Component Value Units Date/Time    IR Angiogram Extremity [[id number]] Resulted: 05/07/24 1917     Updated: 05/07/24 2120    FL C Arm During Surgery [[id number]] Resulted: 05/07/24 2120     Updated: 05/07/24 2120    Narrative:      This procedure was auto-finalized with no dictation required.    CT Angio Abdominal Aorta Bilateral Iliofem Runoff [[id number]] Collected: 05/07/24 1626     Updated: 05/07/24 1644    Narrative:      CT ANGIO ABDOMINAL AORTA BILAT ILIOFEM RUNOFF- 5/7/2024 2:56 PM     HISTORY: angio of LLE with runoff due to normal venous study, cold foot,  pain and paresthesias, sent from PCP     COMPARISON: Abdominal CT dated 3/20/2022     DOSE LENGTH PRODUCT: 1261.6 mGy.cm. Automated exposure control was also  utilized to decrease patient radiation dose.     TECHNIQUE: Helical tomographic images of the abdomen, pelvis and  bilateral lower extremities utilizing angiographic protocol were  obtained following the intravenous infusion of contrast. Multiplanar and  3 D reformatted images were provided for review.     FINDINGS:     ANGIOGRAM:     Abdominal aorta is normal in caliber. No dissection or flow-limiting  stenosis. No flow-limiting stenosis at the origin of the celiac or  superior mesenteric arteries. RACHEL is patent. No flow-limiting stenosis  at the renal artery origins.     Common, internal and external iliac arteries are patent and normal in  caliber. Common femoral arteries are patent and normal in caliber.     Deep and superficial femoral arteries on the right are patent and normal  in " caliber. The popliteal artery is patent and normal in caliber.  Anterior tibial, posterior tibial and peroneal arteries are patent and  normal in caliber with three-vessel runoff to the right ankle.     Deep and superficial femoral arteries on the left are patent and normal  in caliber. Above the knee and behind the knee segments of the popliteal  artery are unremarkable. There is an abrupt cut off of the below the  knee popliteal artery just before the level of the anterior tibial  artery (series 4-images 472 through 474). The anterior tibial artery  origin is occluded although there is a quick reconstitution of the  vessel. The same is true for the posterior tibial artery which  reconstitutes just beyond its origin. The peroneal artery reconstitutes  but much further down in the calf. All 3 vessels are visible at the  level of the ankle.     OTHER FINDINGS:     LOWER CHEST: The lung bases are clear. Included mediastinal structures  are unremarkable.     LIVER: Liver steatosis. No focal lesion. Portal veins are patent.     BILIARY SYSTEM: The gallbladder is unremarkable. No intrahepatic or  extrahepatic ductal dilatation.     PANCREAS: No focal pancreatic lesion identified.      SPLEEN: Unremarkable.     KIDNEYS AND ADRENALS: Adrenal glands are unremarkable. Kidneys are  unremarkable. The ureters are decompressed and normal in appearance.     RETROPERITONEUM: No mass, lymphadenopathy or hemorrhage.     GI TRACT: The stomach is nondistended. Small bowel loops are nondilated.   No inflammatory changes are seen along the colon. Prior appendectomy.     OTHER: There is no mesenteric mass, lymphadenopathy, free fluid or free  air. Abdominal venous structures are not yet opacified. No acute bony  abnormality seen.     PELVIS: There appears to be a 5.5 cm intramural uterine fibroid at the  fundus. No pelvic adenopathy. Urinary bladder is decompressed.       Impression:      1. There appears to be intraluminal thrombus with  abrupt vessel cut off  in the left below-the-knee popliteal artery segment with occlusion of  the proximal portions of the anterior tibial, posterior tibial and  peroneal arteries. All 3 of these vessels eventually reconstitute and  are faintly visible in the distal calf and at the level of the ankle.     Findings and recommendations were discussed with PRIYANK WESLEY JR on  5/7/2024 4:35 PM.        This report was signed and finalized on 5/7/2024 4:41 PM by Dr Abraham Riddle.       XR Ankle 3+ View Left [[id number]] Collected: 05/07/24 1601     Updated: 05/07/24 1604    Narrative:      EXAMINATION: XR ANKLE 3+ VW LEFT-  5/7/2024 4:01 PM     HISTORY: New onset pain and paresthesias     COMPARISON: None      TECHNIQUE:  Frontal, lateral and oblique radiographs of the left ankle were provided  for review. 3 views     FINDINGS:  There is no evidence of acute fracture or dislocation. The soft tissues  are normal in appearance. The joint spaces are maintained.          Impression:         1.  No acute osseous abnormality in the LEFT ankle.     This report was signed and finalized on 5/7/2024 4:01 PM by Dr. Gerson Birch MD.       XR Tibia Fibula 2 View Left [[id number]] Collected: 05/07/24 1559     Updated: 05/07/24 1604    Narrative:      EXAMINATION: XR TIBIA FIBULA 2 VW LEFT-  5/7/2024 3:59 PM      HISTORY: New onset pain and paresthesias to the left lower extremity     COMPARISON: NONE     FINDINGS:  Frontal and lateral radiographs of the LEFT lower leg were obtained.     There is no evidence of fracture. Limited evaluation of the knee and  ankle joints is unremarkable. The soft tissues are grossly unremarkable.  Incidental note of mild quadriceps enthesopathy at the patellar  insertion.          Impression:         1.  No acute osseous abnormality in the LEFT lower leg.     This report was signed and finalized on 5/7/2024 3:59 PM by Dr. Gerson Birch MD.       XR Foot 3+ View Left [[id number]] Collected:  05/07/24 1559     Updated: 05/07/24 1603    Narrative:      EXAMINATION: XR FOOT 3+ VW LEFT-  5/7/2024 3:59 PM     HISTORY: new onset pain and paresthesias     COMPARISON: None      TECHNIQUE:  Frontal, lateral and oblique radiographs of the LEFT foot were provided  for review.     FINDINGS:  Incidental note of fragmented os peroneus. No acute fracture identified.  Normal alignment. No destructive bone lesion. Joint spaces are  relatively well-preserved. Surrounding soft tissues demonstrate no acute  findings.          Impression:         1.  Incidental note of fragmented os peroneus. No definite acute osseous  abnormality in the LEFT foot.     This report was signed and finalized on 5/7/2024 4:00 PM by Dr. Gerson Birch MD.               I have reviewed the patient's current medications.     Assessment/Plan   Assessment  Active Hospital Problems    Diagnosis     **Arterial occlusion, lower extremity          Medical Decision Making  Number and Complexity of problems:   Status post thrombectomy/angioplasty on May 7 for ischemic left lower extremity secondary to embolism to the left popliteal, anterior tibial, tibioperoneal trunk, peroneal posterior tibial arteries.  Hypercoagulable workup pending    History of SVT-so far maintaining normal sinus rhythm at 86 at the time of visit.  Echocardiogram pending.  Consider 14-day Holter monitor upon discharge      Anxiety state-as needed Ativan  Nausea supportive care with as needed IV antiemetic    Normocytic anemia history of menorrhagia and perioperative blood loss; transfuse as needed for hemoglobin less than 7 or for symptoms of anemia if hemoglobin less than 8    Uterine fibroid-followed with gynecologist at [facility] tobacco abuse -Counseled on smoking cessation  Treatment Plan  Plans as discussed above.  Continue heparin drip per protocol and subsequently changed to oral anticoagulation when appropriate from vascular surgeon.  Follow-up hypercoagulable workup.  Most  of this percentile  Pending tests includes factor V Leyden  Anticardiolipin  REMI  Lupus anticoagulant  Protein S and protein C  Factor II DNA analysis    Medications reviewed  atenolol, 25 mg, Oral, BID  nicotine, 1 patch, Transdermal, Q24H  sodium chloride, 10 mL, Intravenous, Q12H        Conditions and Status  Fair     [place] Data  External documents reviewed: -  Cardiac tracing (EKG, telemetry) interpretation: Normal sinus rhythm on telemetry  Radiology interpretation: Revisited reviewed and correlated in patient's condition  Labs reviewed: Yes  Any tests that were considered but not ordered: None     Decision rules/scores evaluated (example [id number], [place], etc): None     Discussed with: Patient     Care Planning  Shared decision making: Patient and consultant  Code status and discussions: Full code  Disposition  Social Determinants of Health that impact treatment or disposition: None identified at this time   I expect the patient to be discharged to (TBD)  Electronically signed by Vince Pereira MD, 05/08/24, 08:10 CDT.

## 2024-05-08 NOTE — OP NOTE
Preoperative diagnosis:  1.  Ischemic left lower extremity secondary to embolism to the left popliteal, anterior tibial, tibioperoneal trunk, peroneal posterior tibial arteries    Postoperative diagnosis: Same    Operative procedure:  1.  Ultrasound-guided cannulation right common femoral artery with 6 Canadian later 7 Canadian destination sheath  2.  Aortogram with bilateral iliofemoral arteriogram  3.  Selective left lower extremity arteriogram  4.  Suction embolectomy/thrombectomy of left popliteal, anterior tibial, tibioperoneal trunk, peroneal, and proximal posterior tibial artery with penumbra lightening bolt 7 Canadian embolectomy device  5.  Left lower extremity arteriograms  6.  Suction embolectomy/thrombectomy of left lateral plantar artery with 6 Canadian Pronto  7.  Low-pressure balloon angioplasty of the left posterior tibial and lateral plantar artery with 3 mm tapering to 2.5 mm balloon  8.  Completion left lower extremity arteriogram  9.  Right lower extremity arteriogram  10.  Pro-glide closure right common femoral artery puncture site    Surgeon: Trever Turcios MD    Anesthesia: General endotracheal    Estimated blood loss: 100 mL    Specimens: Embolus/thrombus to pathology    Findings:  1.  There is an acutely occluded left distal popliteal artery with acute and some chronic appearing thrombus/embolus.  It extends into the proximal anterior tibial, entire tibioperoneal trunk, proximal peroneal and posterior tibial arteries.  There is also embolic material in the lateral plantar artery.  2.  Otherwise, the aorta, bilateral iliac arteries, bilateral common femoral, profunda femoris, superficial femoral, right popliteal arteries are widely patent with three-vessel runoff to the right foot.    Procedure in detail:    After the patient was consented and already on intravenous heparin, she is brought to the operating room placed on the table supine.  General endotracheal anesthesia was achieved.  Dwyer catheter  was placed by nursing.  The bilateral groins and left leg and foot were prepped and draped sterilely.    Micropuncture needle was used to cannulate the right common femoral artery under ultrasound guidance and fluoroscopic visualization.  Seldinger technique was utilized to place a 6 Kosovan glide sheath.  Over an 035 wire, an Omni Flush catheter was placed into the distal abdominal aorta and distal abdominal aortogram with bilateral iliofemoral arteriograms were performed with above findings.  The Omni Flush catheter was placed down into the left common femoral artery and selective left lower extremity arteriograms were performed.  I did pass the Omni Flush catheter down into the mid left superficial femoral artery to get better visualization of the popliteal and tibial artery runoff.  Above findings were noted.  I exchanged the 6 Kosovan sheath for his 7 Kosovan 65 cm destination sheath which was placed into the mid left superficial femoral artery.    The penumbra 7 Kosovan lightning bolt embolectomy catheter was then passed up to the popliteal artery thrombus/embolus.  I performed several passes to completely remove the embolus/thrombus from the distal popliteal, proximal anterior tibial and proximal posterior tibial arteries.  Arteriograms reveal a very good result with severe spasm.  Intra-arterial nitroglycerin was administered.  I did perform 1 pass down the peroneal artery as well.  Distal arteriograms reveal that the medial plantar artery is widely patent but the lateral plantar artery has some debris.    A pronto catheter was passed over a command wire and additional thrombectomy was performed here.  Low-pressure balloon angioplasty was performed of the lateral plantar and mid/distal posterior tibial artery with 3 tapering to 2.5 mm diameter balloon.  The balloon was left inflated for 3 minutes.  Completion arteriogram shows a good result with brisk flow down to the foot.    The sheath was withdrawn into the  right distal external iliac artery and then selective right lower extremity arteriograms were performed with above findings.    The sheath was removed and puncture site closed with the Pro-glide closure device.  10 minutes of pressure was applied.  Heparin was not reversed.  The patient tolerated the procedure well.  She was awakened from general anesthesia and brought to the recovery room in good condition.    The plan will be to continue her heparin drip.  She will need a echocardiogram to determine the most likely source of embolism which would be the heart.  Transesophageal echocardiogram would be preferable to transthoracic echocardiogram but I will leave that up to the cardiologist and hospitalist.

## 2024-05-09 ENCOUNTER — READMISSION MANAGEMENT (OUTPATIENT)
Dept: CALL CENTER | Facility: HOSPITAL | Age: 44
End: 2024-05-09
Payer: MEDICAID

## 2024-05-09 VITALS
TEMPERATURE: 98 F | DIASTOLIC BLOOD PRESSURE: 79 MMHG | WEIGHT: 221.34 LBS | SYSTOLIC BLOOD PRESSURE: 132 MMHG | BODY MASS INDEX: 31.69 KG/M2 | OXYGEN SATURATION: 95 % | HEART RATE: 69 BPM | HEIGHT: 70 IN | RESPIRATION RATE: 16 BRPM

## 2024-05-09 LAB
ANA SER QL: NEGATIVE
APTT PPP: 24 SECONDS (ref 24.5–36)
CARDIOLIPIN IGG SER IA-ACNC: <9 GPL U/ML (ref 0–14)
CARDIOLIPIN IGM SER IA-ACNC: <9 MPL U/ML (ref 0–12)

## 2024-05-09 PROCEDURE — 85730 THROMBOPLASTIN TIME PARTIAL: CPT | Performed by: INTERNAL MEDICINE

## 2024-05-09 PROCEDURE — G0378 HOSPITAL OBSERVATION PER HR: HCPCS

## 2024-05-09 PROCEDURE — 63710000001 ONDANSETRON ODT 4 MG TABLET DISPERSIBLE: Performed by: INTERNAL MEDICINE

## 2024-05-09 RX ORDER — NICOTINE 21 MG/24HR
1 PATCH, TRANSDERMAL 24 HOURS TRANSDERMAL
Qty: 28 PATCH | Refills: 0 | Status: SHIPPED | OUTPATIENT
Start: 2024-05-09

## 2024-05-09 RX ORDER — OXYCODONE HYDROCHLORIDE AND ACETAMINOPHEN 5; 325 MG/1; MG/1
1 TABLET ORAL EVERY 4 HOURS PRN
Qty: 5 TABLET | Refills: 0 | Status: SHIPPED | OUTPATIENT
Start: 2024-05-09

## 2024-05-09 RX ADMIN — APIXABAN 5 MG: 5 TABLET, FILM COATED ORAL at 08:35

## 2024-05-09 RX ADMIN — ATENOLOL 25 MG: 25 TABLET ORAL at 08:34

## 2024-05-09 RX ADMIN — ONDANSETRON 4 MG: 4 TABLET, ORALLY DISINTEGRATING ORAL at 06:53

## 2024-05-09 RX ADMIN — OXYCODONE HYDROCHLORIDE AND ACETAMINOPHEN 1 TABLET: 5; 325 TABLET ORAL at 06:53

## 2024-05-09 RX ADMIN — Medication 10 ML: at 08:35

## 2024-05-09 NOTE — PLAN OF CARE
Goal Outcome Evaluation:  Plan of Care Reviewed With: patient        Progress: improving  Outcome Evaluation: A&O, dressing C/D/I, medicated x1 for c/o pain and nausea, up w stand by, tolerating diet. voiding.

## 2024-05-09 NOTE — DISCHARGE SUMMARY
[facility]  DISCHARGE SUMMARY       Date of Admission: 5/7/2024  Date of Discharge:  5/9/2024  Primary Care Physician: Lela Boyle APRN    Presenting Problem/History of Present Illness:  Acute paresthesias, pins-and-needles and pain left lower extremity.      Final Discharge Diagnoses:  Active Hospital Problems    Diagnosis     **Arterial occlusion, lower extremity        Consults:   Dr. Turcios  Procedures Performed:   Case Time: Procedures: Surgeons:   05/07/24 2006 ARTERIOGRAM LOWER EXTREMITY, SUCTION THROMBECTOMY LEFT POPLITEAL ARTERY, BALLOON ANGIOPLASTY LEFT POSTERIOR TIBIAL ARTERY    Trever Turcios MD               Signed         Preoperative diagnosis:  1.  Ischemic left lower extremity secondary to embolism to the left popliteal, anterior tibial, tibioperoneal trunk, peroneal posterior tibial arteries     Postoperative diagnosis: Same     Operative procedure:  1.  Ultrasound-guided cannulation right common femoral artery with 6 [place] Islander later 7 [place] Islander destination sheath  2.  Aortogram with bilateral iliofemoral arteriogram  3.  Selective left lower extremity arteriogram  4.  Suction embolectomy/thrombectomy of left popliteal, anterior tibial, tibioperoneal trunk, peroneal, and proximal posterior tibial artery with penumbra lightening bolt 7 [place] Islander embolectomy device  5.  Left lower extremity arteriograms  6.  Suction embolectomy/thrombectomy of left lateral plantar artery with 6 [place] Island[place] Pronto  7.  Low-pressure balloon angioplasty of the left posterior tibial and lateral plantar artery with 3 mm tapering to 2.5 mm balloon  8.  Completion left lower extremity arteriogram  9.  Right lower extremity arteriogram  10.  Pro-glide closure right common femoral artery puncture site     Surgeon: Trever Turcios MD     Anesthesia: General endotracheal     Estimated blood loss: 100 mL                 Pertinent Test Results:   Results for orders placed during  the hospital encounter of 05/07/24    Adult Transthoracic Echo Complete W/ Cont if Necessary Per Protocol    Interpretation Summary    Left ventricular systolic function is normal. Left ventricular ejection fraction appears to be 61 - 65%.    Left ventricular wall thickness is consistent with mild concentric hypertrophy.    Left ventricular diastolic function was normal.    Normal right ventricular cavity size and systolic function.    The left atrial cavity is mildly dilated.    The right atrial cavity is borderline dilated.    There are no hemodynamically significant (more than mild) valve abnormalities.      Imaging Results (All)       Procedure Component Value Units Date/Time    IR Angiogram Extremity [[id number]] Resulted: 05/08/24 0937     Updated: 05/08/24 0937    FL C Arm During Surgery [[id number]] Resulted: 05/07/24 2120     Updated: 05/07/24 2120    Narrative:      This procedure was auto-finalized with no dictation required.    CT Angio Abdominal Aorta Bilateral Iliofem Runoff [[id number]] Collected: 05/07/24 1626     Updated: 05/07/24 1644    Narrative:      CT ANGIO ABDOMINAL AORTA BILAT ILIOFEM RUNOFF- 5/7/2024 2:56 PM     HISTORY: angio of LLE with runoff due to normal venous study, cold foot,  pain and paresthesias, sent from PCP     COMPARISON: Abdominal CT dated 3/20/2022     DOSE LENGTH PRODUCT: 1261.6 mGy.cm. Automated exposure control was also  utilized to decrease patient radiation dose.     TECHNIQUE: Helical tomographic images of the abdomen, pelvis and  bilateral lower extremities utilizing angiographic protocol were  obtained following the intravenous infusion of contrast. Multiplanar and  3 D reformatted images were provided for review.     FINDINGS:     ANGIOGRAM:     Abdominal aorta is normal in caliber. No dissection or flow-limiting  stenosis. No flow-limiting stenosis at the origin of the celiac or  superior mesenteric arteries. RACHEL is patent. No flow-limiting stenosis  at the renal  artery origins.     Common, internal and external iliac arteries are patent and normal in  caliber. Common femoral arteries are patent and normal in caliber.     Deep and superficial femoral arteries on the right are patent and normal  in caliber. The popliteal artery is patent and normal in caliber.  Anterior tibial, posterior tibial and peroneal arteries are patent and  normal in caliber with three-vessel runoff to the right ankle.     Deep and superficial femoral arteries on the left are patent and normal  in caliber. Above the knee and behind the knee segments of the popliteal  artery are unremarkable. There is an abrupt cut off of the below the  knee popliteal artery just before the level of the anterior tibial  artery (series 4-images 472 through 474). The anterior tibial artery  origin is occluded although there is a quick reconstitution of the  vessel. The same is true for the posterior tibial artery which  reconstitutes just beyond its origin. The peroneal artery reconstitutes  but much further down in the calf. All 3 vessels are visible at the  level of the ankle.     OTHER FINDINGS:     LOWER CHEST: The lung bases are clear. Included mediastinal structures  are unremarkable.     LIVER: Liver steatosis. No focal lesion. Portal veins are patent.     BILIARY SYSTEM: The gallbladder is unremarkable. No intrahepatic or  extrahepatic ductal dilatation.     PANCREAS: No focal pancreatic lesion identified.      SPLEEN: Unremarkable.     KIDNEYS AND ADRENALS: Adrenal glands are unremarkable. Kidneys are  unremarkable. The ureters are decompressed and normal in appearance.     RETROPERITONEUM: No mass, lymphadenopathy or hemorrhage.     GI TRACT: The stomach is nondistended. Small bowel loops are nondilated.   No inflammatory changes are seen along the colon. Prior appendectomy.     OTHER: There is no mesenteric mass, lymphadenopathy, free fluid or free  air. Abdominal venous structures are not yet opacified. No  acute bony  abnormality seen.     PELVIS: There appears to be a 5.5 cm intramural uterine fibroid at the  fundus. No pelvic adenopathy. Urinary bladder is decompressed.       Impression:      1. There appears to be intraluminal thrombus with abrupt vessel cut off  in the left below-the-knee popliteal artery segment with occlusion of  the proximal portions of the anterior tibial, posterior tibial and  peroneal arteries. All 3 of these vessels eventually reconstitute and  are faintly visible in the distal calf and at the level of the ankle.     Findings and recommendations were discussed with PRIYANK WESLEY JR on  5/7/2024 4:35 PM.        This report was signed and finalized on 5/7/2024 4:41 PM by Dr Abraham Riddle.       XR Ankle 3+ View Left [[id number]] Collected: 05/07/24 1601     Updated: 05/07/24 1604    Narrative:      EXAMINATION: XR ANKLE 3+ VW LEFT-  5/7/2024 4:01 PM     HISTORY: New onset pain and paresthesias     COMPARISON: None      TECHNIQUE:  Frontal, lateral and oblique radiographs of the left ankle were provided  for review. 3 views     FINDINGS:  There is no evidence of acute fracture or dislocation. The soft tissues  are normal in appearance. The joint spaces are maintained.          Impression:         1.  No acute osseous abnormality in the LEFT ankle.     This report was signed and finalized on 5/7/2024 4:01 PM by Dr. Gerson Birch MD.       XR Tibia Fibula 2 View Left [[id number]] Collected: 05/07/24 1559     Updated: 05/07/24 1604    Narrative:      EXAMINATION: XR TIBIA FIBULA 2 VW LEFT-  5/7/2024 3:59 PM      HISTORY: New onset pain and paresthesias to the left lower extremity     COMPARISON: NONE     FINDINGS:  Frontal and lateral radiographs of the LEFT lower leg were obtained.     There is no evidence of fracture. Limited evaluation of the knee and  ankle joints is unremarkable. The soft tissues are grossly unremarkable.  Incidental note of mild quadriceps enthesopathy at the  patellar  insertion.          Impression:         1.  No acute osseous abnormality in the LEFT lower leg.     This report was signed and finalized on 5/7/2024 3:59 PM by Dr. Gerson Birch MD.       XR Foot 3+ View Left [[id number]] Collected: 05/07/24 1559     Updated: 05/07/24 1603    Narrative:      EXAMINATION: XR FOOT 3+ VW LEFT-  5/7/2024 3:59 PM     HISTORY: new onset pain and paresthesias     COMPARISON: None      TECHNIQUE:  Frontal, lateral and oblique radiographs of the LEFT foot were provided  for review.     FINDINGS:  Incidental note of fragmented os peroneus. No acute fracture identified.  Normal alignment. No destructive bone lesion. Joint spaces are  relatively well-preserved. Surrounding soft tissues demonstrate no acute  findings.          Impression:         1.  Incidental note of fragmented os peroneus. No definite acute osseous  abnormality in the LEFT foot.     This report was signed and finalized on 5/7/2024 4:00 PM by Dr. Gerson Birch MD.             LAB RESULTS:      Lab 05/09/24  0527 05/08/24  1453 05/08/24  0811 05/08/24  0602 05/07/24  1639 05/07/24  1551   WBC  --   --   --  9.38  --  9.22   HEMOGLOBIN  --   --   --  8.4*  --  9.1*   HEMATOCRIT  --   --   --  27.3*  --  28.5*   PLATELETS  --   --   --  268  --  309   NEUTROS ABS  --   --   --  6.25  --  6.73   IMMATURE GRANS (ABS)  --   --   --  0.08*  --  0.09*   LYMPHS ABS  --   --   --  2.34  --  1.74   MONOS ABS  --   --   --  0.52  --  0.53   EOS ABS  --   --   --  0.16  --  0.10   MCV  --   --   --  93.2  --  92.2   PROTIME  --   --   --   --  13.4  --    APTT 24.0* 23.4* 106.9* 62.1* 22.1*  --          Lab 05/07/24  1551   SODIUM 141   POTASSIUM 3.9   CHLORIDE 107   CO2 23.0   ANION GAP 11.0   BUN 12   CREATININE 0.70   EGFR 111.6   GLUCOSE 107*   CALCIUM 9.3         Lab 05/07/24  1551   TOTAL PROTEIN 6.9   ALBUMIN 4.3   GLOBULIN 2.6   ALT (SGPT) 20   AST (SGOT) 20   BILIRUBIN 0.2   ALK PHOS 54         Lab 05/07/24  1051    PROTIME 13.4   INR 0.98                 Brief Urine Lab Results  (Last result in the past 365 days)        Color   Clarity   Blood   Leuk Est   Nitrite   Protein   CREAT   Urine HCG        03/01/24 1038 Yellow   Clear   Large (3+)   Negative   Negative   Trace                 Microbiology Results (last 10 days)       ** No results found for the last 240 hours. **            Hospital Course:   Patient been cleared by vascular surgeon for discharge on Eliquis.  Patient presented with acute paresthesias with pins-and-needles and pain left lower extremity.  On imaging study, she was found with    She was started on IV heparin  Since she is young woman and has no known risk factors and this is an arterial thrombosis, I requested for hypercoagulable workup.  Most of this are still pending and will need to be followed up by primary care provider.  Echocardiogram has not mentioned any evidence of thrombosis.  Her telemetry did not show evidence of atrial fibrillation (she carries history of SVT-thus, she is on atenolol.).  I requested for 14-day Holter monitor which I will defer further follow-up to primary care provider.  I would recommend follow-up with hematology for abnormality.      Otherwise, patient will be discharged on anticoagulation.  Duration will depend on further findings on hypercoagulable workup but for now would recommend at least 3 months unless otherwise specified by Dr. Turcios.  I reached out to Dr. Turcios who mentioned at least 3 months.    Patient's symptoms improved.    Patient has no bleeding following heparin and switch to Eliquis.  She has known history of fibroid and follows with gynecologist at [facility]  She also has menorrhagia.  She is anemic (probably chronic blood loss from menorrhagia with acute component) as it appears to be at baseline.  She lost 100 mL of blood during operative procedure.  I recommend follow-up H&H primary care provider on follow-up.  I recommend seek medical attention if  "any bleeding.    We discussed about supplementing with oral iron.  We discussed about what to expect with iron replacement (can cause dyspepsia, dark stool, etc.).  She verbalized adequate understanding.    Overall she is doing better and believed to be medically stable and appropriate for discharge.    All questions were answered to the best of my abilities during encounter.  She verbalized adequate understanding.  Physical Exam on Discharge:  /54 (BP Location: Left arm, Patient Position: Sitting)   Pulse 63   Temp 97.9 °F (36.6 °C) (Oral)   Resp 16   Ht 177.8 cm (70\")   Wt 100 kg (221 lb 5.5 oz)   SpO2 100%   BMI 31.76 kg/m²   Physical Exam  GEN: Awake, alert, interactive, in NAD  HEENT: Atraumatic, PERRLA, EOMI, Anicteric, Trachea midline  Lungs: CTAB, no wheezing/rales/rhonchi  Heart: RRR, +S1/s2, no rub  ABD: soft, nt/nd, +BS, no guarding/rebound  Extremities: atraumatic, no cyanosis, no edema; she has dressing on right groin area.  No gross hematoma appreciated.  No gross bruising.  Skin: no rashes or lesions  Neuro: AAOx3, no focal deficits  Psych: normal mood & affect       Condition on Discharge: Stable    Discharge Disposition:  Home or Self Care    Discharge Medications:     Discharge Medications        New Medications        Instructions Start Date   apixaban 5 MG tablet tablet  Commonly known as: ELIQUIS   5 mg, Oral, Every 12 Hours Scheduled      nicotine 21 MG/24HR patch  Commonly known as: NICODERM CQ   1 patch, Transdermal, Every 24 Hours Scheduled      oxyCODONE-acetaminophen 5-325 MG per tablet  Commonly known as: PERCOCET   1 tablet, Oral, Every 4 Hours PRN             Continue These Medications        Instructions Start Date   atenolol 25 MG tablet  Commonly known as: TENORMIN   25 mg, Oral, 2 Times Daily      fenofibrate 145 MG tablet  Commonly known as: TRICOR   145 mg, Oral, Daily      multivitamin with minerals tablet tablet   1 tablet, Oral, Daily               This patient has " current or prior documentation of an left ventricular ejection fraction (LVEF) of less than or equal to 40%.-Not applicable      Discharge Diet:   Diet Instructions       Diet: Regular/House Diet; Thin (IDDSI 0)      Discharge Diet: Regular/House Diet    Fluid Consistency: Thin (IDDSI 0)            Activity at Discharge:   Activity Instructions       Gradually Increase Activity Until at Pre-Hospitalization Level              Follow-up Appointments:   PCP within 1 week with CBC; follow-up hypercoagulability workup.  Facilitate care with hematology as needed.  Dr. Turcios per his recommendation  Keep follow-up appointment with gy[facility]ologist [facility]    Test Results Pending at Discharge:   REMI, lupus anticoagulant, anticardiolipin antibody, factor V Leyden, protein C and S antigen, factor II DNA analysis  Electronically signed by Vince Pereira MD, 05/09/24, 08:01 CDT.    Time: Greater than 30 minutes.

## 2024-05-10 ENCOUNTER — TRANSITIONAL CARE MANAGEMENT TELEPHONE ENCOUNTER (OUTPATIENT)
Dept: CALL CENTER | Facility: HOSPITAL | Age: 44
End: 2024-05-10
Payer: MEDICAID

## 2024-05-10 LAB
F5 GENE MUT ANL BLD/T: NORMAL
FACTOR II, DNA ANALYSIS: NORMAL
PROT C AG ACT/NOR PPP IA: 91 % (ref 60–150)
PROT S AG ACT/NOR PPP IA: 75 % (ref 60–150)

## 2024-05-12 LAB
CYTO UR: NORMAL
LAB AP CASE REPORT: NORMAL
Lab: NORMAL
PATH REPORT.FINAL DX SPEC: NORMAL
PATH REPORT.GROSS SPEC: NORMAL

## 2024-05-13 LAB
APTT SCREEN TO CONFIRM RATIO: 1.09 RATIO (ref 0–1.34)
CONFIRM APTT/NORMAL: 34.1 SEC (ref 0–47.6)
LA 2 SCREEN W REFLEX-IMP: ABNORMAL
SCREEN APTT: 30.3 SEC (ref 0–43.5)
SCREEN DRVVT: 36.5 SEC (ref 0–47)
THROMBIN TIME: >150 SEC (ref 0–23)
TT IMM NP PPP: 103.3 SEC (ref 0–23)
TT P HPASE PPP: 19.7 SEC (ref 0–23)

## 2024-05-14 ENCOUNTER — OFFICE VISIT (OUTPATIENT)
Dept: FAMILY MEDICINE CLINIC | Facility: CLINIC | Age: 44
End: 2024-05-14
Payer: MEDICAID

## 2024-05-14 ENCOUNTER — LAB (OUTPATIENT)
Dept: LAB | Facility: HOSPITAL | Age: 44
End: 2024-05-14
Payer: MEDICAID

## 2024-05-14 VITALS
BODY MASS INDEX: 31.64 KG/M2 | DIASTOLIC BLOOD PRESSURE: 81 MMHG | SYSTOLIC BLOOD PRESSURE: 131 MMHG | RESPIRATION RATE: 20 BRPM | HEART RATE: 67 BPM | WEIGHT: 221 LBS | HEIGHT: 70 IN

## 2024-05-14 DIAGNOSIS — D50.8 OTHER IRON DEFICIENCY ANEMIA: ICD-10-CM

## 2024-05-14 DIAGNOSIS — D25.9 UTERINE LEIOMYOMA, UNSPECIFIED LOCATION: ICD-10-CM

## 2024-05-14 DIAGNOSIS — I70.209 ARTERIAL OCCLUSION, LOWER EXTREMITY: Primary | ICD-10-CM

## 2024-05-14 DIAGNOSIS — F41.8 SITUATIONAL ANXIETY: ICD-10-CM

## 2024-05-14 DIAGNOSIS — N92.1 MENORRHAGIA WITH IRREGULAR CYCLE: ICD-10-CM

## 2024-05-14 DIAGNOSIS — R53.83 OTHER FATIGUE: ICD-10-CM

## 2024-05-14 DIAGNOSIS — I47.10 SVT (SUPRAVENTRICULAR TACHYCARDIA): ICD-10-CM

## 2024-05-14 DIAGNOSIS — F17.200 TOBACCO DEPENDENCE: ICD-10-CM

## 2024-05-14 DIAGNOSIS — E78.1 HYPERTRIGLYCERIDEMIA: ICD-10-CM

## 2024-05-14 DIAGNOSIS — I70.209 ARTERIAL OCCLUSION, LOWER EXTREMITY: ICD-10-CM

## 2024-05-14 LAB
ALBUMIN SERPL-MCNC: 4.5 G/DL (ref 3.5–5)
ALBUMIN/GLOB SERPL: 1.4 G/DL (ref 1.1–2.5)
ALP SERPL-CCNC: 49 U/L (ref 24–120)
ALT SERPL W P-5'-P-CCNC: 35 U/L (ref 0–35)
ANION GAP SERPL CALCULATED.3IONS-SCNC: 8 MMOL/L (ref 4–13)
AST SERPL-CCNC: 31 U/L (ref 7–45)
AUTO MIXED CELLS #: 1 10*3/MM3 (ref 0.1–2.6)
AUTO MIXED CELLS %: 10.5 % (ref 0.1–24)
BILIRUB SERPL-MCNC: 0.3 MG/DL (ref 0.1–1)
BUN SERPL-MCNC: 14 MG/DL (ref 5–21)
BUN/CREAT SERPL: 18.9
CALCIUM SPEC-SCNC: 9.7 MG/DL (ref 8.6–10.5)
CHLORIDE SERPL-SCNC: 103 MMOL/L (ref 98–110)
CO2 SERPL-SCNC: 26 MMOL/L (ref 24–31)
CREAT SERPL-MCNC: 0.74 MG/DL (ref 0.5–1.4)
EGFRCR SERPLBLD CKD-EPI 2021: 104.4 ML/MIN/1.73
ERYTHROCYTE [DISTWIDTH] IN BLOOD BY AUTOMATED COUNT: 13.9 % (ref 12.3–15.4)
GLOBULIN UR ELPH-MCNC: 3.3 GM/DL
GLUCOSE SERPL-MCNC: 120 MG/DL (ref 70–100)
HCT VFR BLD AUTO: 29 % (ref 34–46.6)
HGB BLD-MCNC: 9 G/DL (ref 12–15.9)
LYMPHOCYTES # BLD AUTO: 2 10*3/MM3 (ref 0.7–3.1)
LYMPHOCYTES NFR BLD AUTO: 21.3 % (ref 19.6–45.3)
MCH RBC QN AUTO: 27.7 PG (ref 26.6–33)
MCHC RBC AUTO-ENTMCNC: 31 G/DL (ref 31.5–35.7)
MCV RBC AUTO: 89.2 FL (ref 79–97)
NEUTROPHILS NFR BLD AUTO: 6.3 10*3/MM3 (ref 1.7–7)
NEUTROPHILS NFR BLD AUTO: 68.2 % (ref 42.7–76)
PLATELET # BLD AUTO: 238 10*3/MM3 (ref 140–450)
PMV BLD AUTO: 8.1 FL (ref 6–12)
POTASSIUM SERPL-SCNC: 4.2 MMOL/L (ref 3.5–5.3)
PROT SERPL-MCNC: 7.8 G/DL (ref 6.3–8.7)
RBC # BLD AUTO: 3.25 10*6/MM3 (ref 3.77–5.28)
SODIUM SERPL-SCNC: 137 MMOL/L (ref 135–145)
WBC NRBC COR # BLD AUTO: 9.3 10*3/MM3 (ref 3.4–10.8)

## 2024-05-14 PROCEDURE — 36415 COLL VENOUS BLD VENIPUNCTURE: CPT

## 2024-05-14 PROCEDURE — 1160F RVW MEDS BY RX/DR IN RCRD: CPT | Performed by: NURSE PRACTITIONER

## 2024-05-14 PROCEDURE — 85025 COMPLETE CBC W/AUTO DIFF WBC: CPT

## 2024-05-14 PROCEDURE — 80053 COMPREHEN METABOLIC PANEL: CPT

## 2024-05-14 PROCEDURE — 99214 OFFICE O/P EST MOD 30 MIN: CPT | Performed by: NURSE PRACTITIONER

## 2024-05-14 PROCEDURE — 1159F MED LIST DOCD IN RCRD: CPT | Performed by: NURSE PRACTITIONER

## 2024-05-14 RX ORDER — FENOFIBRATE 145 MG/1
145 TABLET, COATED ORAL DAILY
Qty: 30 TABLET | Refills: 2 | Status: SHIPPED | OUTPATIENT
Start: 2024-05-14

## 2024-05-14 RX ORDER — FERROUS GLUCONATE 324(38)MG
324 TABLET ORAL 2 TIMES DAILY
Qty: 60 TABLET | Refills: 2 | Status: SHIPPED | OUTPATIENT
Start: 2024-05-14

## 2024-05-14 RX ORDER — LORAZEPAM 0.5 MG/1
TABLET ORAL
Qty: 60 TABLET | Refills: 0 | Status: SHIPPED | OUTPATIENT
Start: 2024-05-14

## 2024-05-14 NOTE — PROGRESS NOTES
Transitional Care Follow Up Visit  Subjective     Denise Ahn is a 43 y.o. female who presents for a transitional care management visit.    Within 48 business hours after discharge our office contacted her via telephone to coordinate her care and needs.      I reviewed and discussed the details of that call along with the discharge summary, hospital problems, inpatient lab results, inpatient diagnostic studies, and consultation reports with Denise.     Current outpatient and discharge medications have been reconciled for the patient.  Reviewed by: MELLISSA Ching          5/9/2024     9:03 PM   Date of TCM Phone Call   [facility]   Date of Admission 5/7/2024   Date of Discharge 5/9/2024   Discharge Disposition Home or Self Care     Risk for Readmission (LACE) Score: 3 (5/9/2024  5:00 AM)      History of Present Illness  Admitted to [facility] x 2 days for arterial occlusion. Pt states she is just sore but overall doing much better. Very thankful for the care she received here and in hospital. Does want to discuss her anemia, fibroid, heavy menses and starting iron.      Course During Hospital Stay: Ms. Ahn was sent from our office on May 7 for left leg pain and paresthesias.  She was admitted through the emergency room for an arterial occlusion.  Her preop diagnosis was ischemic left lower extremity secondary to embolism to the left popliteal, anterior tibial, tibioperoneal trunk, peroneal posterior tibial arteries.  She had an ultrasound-guided cannulation of the right common femoral artery, suction embolectomy/thrombectomy of the left popliteal anterior tibial, tibioperoneal trunk, peroneal and proximal posterior tibial artery with embolectomy device.  She also had suction embolectomy/thrombectomy of the left lateral plantar artery.  She had low-pressure balloon angioplasty of the left posterior tibial and lateral plantar artery.  Dr. Turcios with vascular performed the  "procedure.  During hospitalization her echocardiogram revealed an ejection fraction of 61 to 65%.  Left ventricular wall thickness consistent with mild concentric hypertrophy.  Left ventricular diastolic function was normal.  Normal right ventricular cavity size and systolic function.  The left atrial cavity mildly dilated.  The right atrial cavity is borderline dilated.  Are no hemodynamically significant valvular abnormalities.  ET angio abdominal aorta iliofemoral runoff showed \"there appears to be intraluminal thrombus with abrupt vessel cutoff in the left below the knee popliteal artery segment with occlusion of the proximal portions of the anterior tibial, posterior tibial and peroneal arteries.  All 3 of these vessels eventually reconstitute and are faintly visible in the distal calf and at the level of the ankle.  Also noted was a 5.5 cm intramural uterine fibroid.  X-ray of her left ankle showed no acute osseous abnormality, x-ray of her left tib-fib showed no evidence of fracture.  X-ray of her left foot revealed no definite acute osseous abnormality left foot.  She was mildly anemic on admission due to a history of iron deficiency anemia and heavy menses.  She follows with an OB/GYN considering a procedure for this in [place].  She was discharged home on Eliquis.  This was transitioned from heparin while in hospital.  A hypercoagulable workup has been initiated per the hospital.  Her telemetry did not show evidence of A-fib.  She is on atenolol for history of SVT.  ED to Hosp-Admission (Discharged) with Vince Pereira MD (05/07/2024)   Comprehensive Metabolic Panel (05/07/2024 15:51)  Uric Acid (05/07/2024 15:51)  CBC & Differential (05/07/2024 15:51)  Protime-INR (05/07/2024 16:39)  aPTT (05/07/2024 16:39)  Antithrombin III (05/07/2024 16:39)   Tissue Pathology Exam (05/07/2024 20:55)   Lupus Anticoagulant (05/08/2024 06:02)  REMI (05/08/2024 06:02)  Factor 5 Leiden (05/08/2024 06:02) "   Protein S Antigen, Total (05/08/2024 06:02)  Protein C Antigen, Total (05/08/2024 06:02)  Factor II, DNA Analysis (05/08/2024 06:02)   XR Foot 3+ View Left (05/07/2024 16:03)  XR Ankle 3+ View Left (05/07/2024 16:03)  XR Tibia Fibula 2 View Left (05/07/2024 16:03)  IR Angiogram Extremity (05/07/2024 21:16)    The following portions of the patient's history were reviewed and updated as appropriate: allergies, current medications, past family history, past medical history, past social history, past surgical history, and problem list.    Review of Systems      Objective   Physical Exam  Constitutional:       Appearance: Normal appearance. She is well-developed.   HENT:      Head: Normocephalic and atraumatic.      Right Ear: Tympanic membrane, ear canal and external ear normal.      Left Ear: Tympanic membrane, ear canal and external ear normal.      Nose: Nose normal. No septal deviation, nasal tenderness or congestion.      Mouth/Throat:      Lips: Pink. No lesions.      Mouth: Mucous membranes are moist. No oral lesions.      Dentition: Normal dentition.      Pharynx: Oropharynx is clear. No pharyngeal swelling, oropharyngeal exudate or posterior oropharyngeal erythema.   Eyes:      General: Lids are normal. Vision grossly intact. No scleral icterus.        Right eye: No discharge.         Left eye: No discharge.      Extraocular Movements: Extraocular movements intact.      Conjunctiva/sclera: Conjunctivae normal.      Right eye: Right conjunctiva is not injected.      Left eye: Left conjunctiva is not injected.      Pupils: Pupils are equal, round, and reactive to light.   Neck:      Thyroid: No thyroid mass.      Trachea: Trachea normal.   Cardiovascular:      Rate and Rhythm: Normal rate and regular rhythm.      Heart sounds: Normal heart sounds. No murmur heard.     No gallop.   Pulmonary:      Effort: Pulmonary effort is normal.      Breath sounds: Normal breath sounds and air entry. No wheezing, rhonchi or  rales.   Abdominal:      General: There is no distension.      Palpations: Abdomen is soft. There is no mass.      Tenderness: There is no abdominal tenderness. There is no right CVA tenderness, left CVA tenderness, guarding or rebound.          Comments: Incision site to right groin dressing is c/d/i   Musculoskeletal:         General: No tenderness or deformity. Normal range of motion.      Cervical back: Full passive range of motion without pain, normal range of motion and neck supple.      Thoracic back: Normal.      Right lower leg: No edema.      Left lower leg: No edema.   Skin:     General: Skin is warm and dry.      Coloration: Skin is not jaundiced.      Findings: No rash.   Neurological:      Mental Status: She is alert and oriented to person, place, and time.      Sensory: Sensation is intact.      Motor: Motor function is intact.      Coordination: Coordination is intact.      Gait: Gait is intact.      Deep Tendon Reflexes: Reflexes are normal and symmetric.   Psychiatric:         Mood and Affect: Mood and affect normal.         Judgment: Judgment normal.         Assessment & Plan   Problem List Items Addressed This Visit       SVT (supraventricular tachycardia)    Relevant Orders    Holter Monitor - 72 Hour Up To 15 Days    Arterial occlusion, lower extremity - Primary    Relevant Orders    Ambulatory Referral to Hematology    CBC Auto Differential (Completed)    Comprehensive Metabolic Panel (Completed)    Holter Monitor - 72 Hour Up To 15 Days     Other Visit Diagnoses       Other iron deficiency anemia        Start oral iron bid.    Relevant Medications    ferrous gluconate (FERGON) 324 MG tablet    Tobacco dependence        Cessation highly encouraged, counseled. She wants to work on this without medication. Has patches she can use however.    Menorrhagia with irregular cycle        Discuss options with her GYN in [place].    Relevant Orders    CBC Auto Differential (Completed)    Comprehensive  Metabolic Panel (Completed)    Uterine leiomyoma, unspecified location        Noted on recent CTA, she will discuss with her GYN, abhilash in setting of heavy/irregular menses bleeding.    Situational anxiety        Anxious, abhilash about her health after this incident. Will start short term ativan, abhilash at HS when her anxiety is highest.    Relevant Medications    LORazepam (Ativan) 0.5 MG tablet    Other fatigue        Discussed could be related to recent condition vs anemia.    Hypertriglyceridemia        Refill and cont tricor-need lab at follow up visit.    Relevant Medications    fenofibrate (TRICOR) 145 MG tablet        Plan:   Arterial occlusion-Symptoms have improved post procedure. Cont Eliquis and follow up with vascular. Consult heme for further workup of this incident. Hypercoag workup neg so far.   SABRINA-start oral iron bid.   Tobacco dependence-Cessation highly encouraged, counseled. She wants to work on this without medication. Has patches she can use however.   Menorrhagia and uterine fibroid-Discuss options with her GYN in [place].   Situational anxiety-Anxious, abhilash about her health after this incident. Will start short term ativan, abhilash at HS when her anxiety is highest.   SVT-cont atenolol. 7 day holter monitor ordered.   Fatigue-discussed could be related to recent condition and or anemia.

## 2024-05-16 ENCOUNTER — TELEPHONE (OUTPATIENT)
Dept: FAMILY MEDICINE CLINIC | Facility: CLINIC | Age: 44
End: 2024-05-16
Payer: MEDICAID

## 2024-05-16 NOTE — TELEPHONE ENCOUNTER
Sent pt [employer] message relaying below    HUB TO RELAY  Your iron levels are improving. Lela does not have further recommendations for you at this time. Please let me know if you have any questions.

## 2024-05-23 ENCOUNTER — HOSPITAL ENCOUNTER (OUTPATIENT)
Dept: CARDIOLOGY | Facility: HOSPITAL | Age: 44
Discharge: HOME | End: 2024-05-23
Payer: MEDICAID

## 2024-05-23 DIAGNOSIS — I70.209 ARTERIAL OCCLUSION, LOWER EXTREMITY: ICD-10-CM

## 2024-05-23 DIAGNOSIS — I47.10 SVT (SUPRAVENTRICULAR TACHYCARDIA): ICD-10-CM

## 2024-05-23 PROCEDURE — 93246 EXT ECG>7D<15D RECORDING: CPT

## 2024-05-29 NOTE — PROGRESS NOTES
[facility]   Hematology/Oncology  Consult Note    Patient Name: Denise Ahn  : 1980  MRN: [id number]  Primary Care Physician:  Lela Boyle APRN  Referring Physician: MELLISSA Ching  Date of admission: (Not on file)    Televisit    Subjective   Subjective     Reason for Consult/ Chief Complaint: Arterial clot, anemia    HPI:  Denise Ahn is a 43 y.o. female with a past medical history of Graves' disease, SVT, recent arterial thromboembolic disease, who presents for the evaluation of the latter, as well as anemia.    The patient was hospitalized on 2024 for left leg pain, where there was a concern for arterial occlusion, with CTA imaging showing ischemic left lower extremity secondary to embolism to the left popliteal, anterior tibial, tibioperoneal trunk, peroneal posterior tibial arteries.  She was evaluated by vascular surgery, Dr. Turcios, and underwent ultrasound-guided cannulation of the right common femoral artery, suction embolectomy/thrombectomy of the left popliteal anterior tibial, tibioperoneal trunk, peroneal and proximal posterior tibial artery with embolectomy device. She also had suction embolectomy/thrombectomy of the left lateral plantar artery.  She was discharged on Eliquis.    Embolic workup with echocardiography showed EF of 61-65% otherwise had normal characteristics; left and right atrial cavity mildly dilated, and left ventricular concentric hypertrophy; telemetry showed no signs of A-fib.  Holter monitor is being planned.    It is of note that the patient has history of menorrhagia, for which she follows with OB/GYN, and does have history of anemia and reported iron deficiency.    Review of Systems  Constitutional:  negative  Eyes: negative  Ears, nose, mouth, throat, and face: negative  Respiratory: negative  Cardiovascular: negative  Gastrointestinal: negative  Genitourinary: negative  Integument/breast: negative  Hematologic/lymphatic:  negative  Musculoskeletal: negative  Neurological: negative  Behavioral/Psych: negative  Endocrine: negative  Allergic/Immunologic: negative    Personal History     Past Medical History:   Diagnosis Date    Graves disease     Kidney stone     SVT (supraventricular tachycardia)        Past Surgical History:   Procedure Laterality Date    APPENDECTOMY      ARTERIOGRAM LOWER EXTREMITY Left 2024    Procedure: ARTERIOGRAM LOWER EXTREMITY, SUCTION THROMBECTOMY LEFT POPLITEAL ARTERY, BALLOON ANGIOPLASTY LEFT POSTERIOR TIBIAL ARTERY;  Surgeon: Trever Turcios MD;  Location:  [facility];  Service: Vascular;  Laterality: Left;     SECTION      ENDOSCOPY      EXTRACORPOREAL SHOCKWAVE LITHOTRIPSY (ESWL), STENT INSERTION/REMOVAL Right 3/23/2022    Procedure: EXTRACORPOREAL SHOCKWAVE LITHOTRIPSY  RIGHT CYSTO STENT PLACEMENT;  Surgeon: Michele Chavez MD;  Location:  [facility] OR;  Service: Urology;  Laterality: Right;    TUBAL ABDOMINAL LIGATION         Family History: family history includes Breast cancer in her paternal aunt. Otherwise pertinent FHx was reviewed and not pertinent to current issue.    Social History:  reports that she has been smoking cigarettes. She has a 13 pack-year smoking history. She has never used smokeless tobacco. She reports current alcohol use. She reports that she does not use drugs.    Home Medications:  LORazepam, apixaban, atenolol, fenofibrate, ferrous gluconate, multivitamin with minerals, nicotine, and oxyCODONE-acetaminophen    Allergies:  No Known Allergies    Objective    Objective     Vitals:        Physical Exam:  General Appearance: Alert, cooperative, no distress, appears stated age  Head:  Normocephalic, without obvious abnormality, atraumatic  Eyes: Conjunctiva/corneas clear   Ears: Normal external ear canals bilaterally  Neck: Supple, symmetrical  Skin: Skin color normal    Result Review:  Lab Results   Component Value Date    WBC 9.30 2024    HGB 9.0 (L)  05/14/2024    HCT 29.0 (L) 05/14/2024    MCV 89.2 05/14/2024     05/14/2024     Assessment & Plan   Assessment / Plan     Brief Patient Summary:  Denise Ahn is a 43 y.o. female with a past medical history of Graves' disease, SVT, recent arterial thromboembolic disease, who presents for the evaluation of the latter, as well as anemia.    Plan:   # Anemia:  -The patient's labs 6/3/2022 have showed hemoglobin within normal range, up until since 5/7/2024 which showed hemoglobin ranging 8.4-9.1, which coincide with her starting on anticoagulation; this could be related to her history of menorrhagia, that could be exacerbated by the fact that she started anticoagulation.  - Today, obtain repeat CBC, in addition to nutritional labs including iron studies, ferritin, vitamin B12 and folic acid.  - Plan to replace with either parenteral iron infusion or p.o. supplementation as needed.  - Obtain CBC and iron studies every 6 months.  - RTC in 6 months.    # PAD - lower extremity arterial occlusion:  - She is s/p LLE arteriogram with thrombectomy to the left popliteal, KERRY, peroneal trunk and PTA; she has history of chronic tobacco use.  - Last she underwent appropriate thrombophilia workup when she was hospitalized, including factor V Leiden, prothrombin gene mutation, protein S, C and Antithrombin III activity level, lupus anticoagulant and anticardiolipin antibodies, which all came back normal; plan to obtain beta-2 glycoprotein antibodies to complete her workup.  - As there is suspicion for embolic disease, I recommend referral to cardiology for further evaluation.  - At this point, as her thrombus disease is arterial rather than venous, I would defer management of anticoagulation to vascular surgery and cardiology.    Mahsa Purvis MD  5/28/2024  21:42 CDT

## 2024-05-31 ENCOUNTER — CONSULT (OUTPATIENT)
Dept: ONCOLOGY | Facility: CLINIC | Age: 44
End: 2024-05-31
Payer: MEDICAID

## 2024-05-31 ENCOUNTER — LAB (OUTPATIENT)
Dept: LAB | Facility: HOSPITAL | Age: 44
End: 2024-05-31
Payer: MEDICAID

## 2024-05-31 VITALS
HEART RATE: 65 BPM | TEMPERATURE: 96.7 F | BODY MASS INDEX: 30.35 KG/M2 | SYSTOLIC BLOOD PRESSURE: 114 MMHG | OXYGEN SATURATION: 97 % | RESPIRATION RATE: 18 BRPM | WEIGHT: 212 LBS | DIASTOLIC BLOOD PRESSURE: 70 MMHG | HEIGHT: 70 IN

## 2024-05-31 DIAGNOSIS — D64.9 ANEMIA, UNSPECIFIED TYPE: ICD-10-CM

## 2024-05-31 DIAGNOSIS — I77.9 PAOD (PERIPHERAL ARTERIAL OCCLUSIVE DISEASE): Primary | ICD-10-CM

## 2024-05-31 DIAGNOSIS — I77.9 PAOD (PERIPHERAL ARTERIAL OCCLUSIVE DISEASE): ICD-10-CM

## 2024-05-31 LAB
BASOPHILS # BLD AUTO: 0.04 10*3/MM3 (ref 0–0.2)
BASOPHILS NFR BLD AUTO: 0.4 % (ref 0–1.5)
DEPRECATED RDW RBC AUTO: 60.2 FL (ref 37–54)
EOSINOPHIL # BLD AUTO: 0.16 10*3/MM3 (ref 0–0.4)
EOSINOPHIL NFR BLD AUTO: 1.7 % (ref 0.3–6.2)
ERYTHROCYTE [DISTWIDTH] IN BLOOD BY AUTOMATED COUNT: 18.2 % (ref 12.3–15.4)
FERRITIN SERPL-MCNC: 32.57 NG/ML (ref 13–150)
FOLATE SERPL-MCNC: 11.2 NG/ML (ref 4.78–24.2)
HCT VFR BLD AUTO: 28.3 % (ref 34–46.6)
HGB BLD-MCNC: 8.1 G/DL (ref 12–15.9)
HOLD SPECIMEN: NORMAL
IMM GRANULOCYTES # BLD AUTO: 0.08 10*3/MM3 (ref 0–0.05)
IMM GRANULOCYTES NFR BLD AUTO: 0.8 % (ref 0–0.5)
IRON 24H UR-MRATE: 14 MCG/DL (ref 37–145)
IRON SATN MFR SERPL: 3 % (ref 20–50)
LYMPHOCYTES # BLD AUTO: 1.92 10*3/MM3 (ref 0.7–3.1)
LYMPHOCYTES NFR BLD AUTO: 20.2 % (ref 19.6–45.3)
MCH RBC QN AUTO: 26 PG (ref 26.6–33)
MCHC RBC AUTO-ENTMCNC: 28.6 G/DL (ref 31.5–35.7)
MCV RBC AUTO: 90.7 FL (ref 79–97)
MONOCYTES # BLD AUTO: 0.51 10*3/MM3 (ref 0.1–0.9)
MONOCYTES NFR BLD AUTO: 5.4 % (ref 5–12)
NEUTROPHILS NFR BLD AUTO: 6.8 10*3/MM3 (ref 1.7–7)
NEUTROPHILS NFR BLD AUTO: 71.5 % (ref 42.7–76)
NRBC BLD AUTO-RTO: 0 /100 WBC (ref 0–0.2)
PLATELET # BLD AUTO: 295 10*3/MM3 (ref 140–450)
PMV BLD AUTO: 9.9 FL (ref 6–12)
RBC # BLD AUTO: 3.12 10*6/MM3 (ref 3.77–5.28)
TIBC SERPL-MCNC: 523 MCG/DL (ref 298–536)
TRANSFERRIN SERPL-MCNC: 351 MG/DL (ref 200–360)
VIT B12 BLD-MCNC: 531 PG/ML (ref 211–946)
WBC NRBC COR # BLD AUTO: 9.51 10*3/MM3 (ref 3.4–10.8)

## 2024-05-31 PROCEDURE — 1126F AMNT PAIN NOTED NONE PRSNT: CPT | Performed by: INTERNAL MEDICINE

## 2024-05-31 PROCEDURE — 84466 ASSAY OF TRANSFERRIN: CPT

## 2024-05-31 PROCEDURE — 82728 ASSAY OF FERRITIN: CPT

## 2024-05-31 PROCEDURE — 83540 ASSAY OF IRON: CPT

## 2024-05-31 PROCEDURE — 85025 COMPLETE CBC W/AUTO DIFF WBC: CPT

## 2024-05-31 PROCEDURE — 82607 VITAMIN B-12: CPT

## 2024-05-31 PROCEDURE — 99204 OFFICE O/P NEW MOD 45 MIN: CPT | Performed by: INTERNAL MEDICINE

## 2024-05-31 PROCEDURE — 36415 COLL VENOUS BLD VENIPUNCTURE: CPT

## 2024-05-31 PROCEDURE — 86146 BETA-2 GLYCOPROTEIN ANTIBODY: CPT

## 2024-05-31 PROCEDURE — 82746 ASSAY OF FOLIC ACID SERUM: CPT

## 2024-06-03 LAB
B2 GLYCOPROT1 IGG SER-ACNC: <9 GPI IGG UNITS (ref 0–20)
B2 GLYCOPROT1 IGM SER-ACNC: <9 GPI IGM UNITS (ref 0–32)

## 2024-06-04 PROBLEM — D50.9 IRON DEFICIENCY ANEMIA: Status: ACTIVE | Noted: 2024-06-04

## 2024-06-10 ENCOUNTER — PATIENT MESSAGE (OUTPATIENT)
Dept: FAMILY MEDICINE CLINIC | Facility: CLINIC | Age: 44
End: 2024-06-10
Payer: MEDICAID

## 2024-06-10 DIAGNOSIS — I70.209 ARTERIAL OCCLUSION, LOWER EXTREMITY: Primary | ICD-10-CM

## 2024-06-10 NOTE — TELEPHONE ENCOUNTER
From: Denise Ahn  To: Lela Boyle  Sent: 6/10/2024 7:51 AM CDT  Subject: Blood thinners    I took my last Eliquis on Saturday night. I try to put in for a request but, it wouldn’t let me do it on the mychart.   
Patient requests all Lab, Cardiology, and Radiology Results on their Discharge Instructions

## 2024-06-11 ENCOUNTER — TELEPHONE (OUTPATIENT)
Dept: FAMILY MEDICINE CLINIC | Facility: CLINIC | Age: 44
End: 2024-06-11
Payer: MEDICAID

## 2024-06-11 DIAGNOSIS — I47.10 SVT (SUPRAVENTRICULAR TACHYCARDIA): Primary | ICD-10-CM

## 2024-06-11 NOTE — TELEPHONE ENCOUNTER
----- Message from Lela Boyle sent at 6/11/2024  7:50 AM CDT -----  Please let pt know results of her holter monitor: There were a few abnormalities including 20 episodes of svt, a bradycardic episode with low heart rate and an 11 sec run of svt-I would like to consult our electrophysiologist Dr Blu abdul for his opinion and guidance abhilash with her history. Thanks. Please initiate consult and let patient know we will be scheduling, thanks.

## 2024-06-12 ENCOUNTER — INFUSION (OUTPATIENT)
Dept: ONCOLOGY | Facility: HOSPITAL | Age: 44
End: 2024-06-12
Payer: MEDICAID

## 2024-06-12 VITALS
OXYGEN SATURATION: 99 % | TEMPERATURE: 97.7 F | HEART RATE: 66 BPM | WEIGHT: 212 LBS | HEIGHT: 70 IN | DIASTOLIC BLOOD PRESSURE: 65 MMHG | RESPIRATION RATE: 20 BRPM | BODY MASS INDEX: 30.35 KG/M2 | SYSTOLIC BLOOD PRESSURE: 113 MMHG

## 2024-06-12 DIAGNOSIS — D50.9 IRON DEFICIENCY ANEMIA, UNSPECIFIED IRON DEFICIENCY ANEMIA TYPE: Primary | ICD-10-CM

## 2024-06-12 PROCEDURE — 25810000003 SODIUM CHLORIDE 0.9 % SOLUTION: Performed by: INTERNAL MEDICINE

## 2024-06-12 PROCEDURE — 96365 THER/PROPH/DIAG IV INF INIT: CPT

## 2024-06-12 PROCEDURE — 96374 THER/PROPH/DIAG INJ IV PUSH: CPT

## 2024-06-12 PROCEDURE — 25010000002 IRON SUCROSE PER 1 MG: Performed by: INTERNAL MEDICINE

## 2024-06-12 RX ORDER — SODIUM CHLORIDE 9 MG/ML
20 INJECTION, SOLUTION INTRAVENOUS ONCE
Status: COMPLETED | OUTPATIENT
Start: 2024-06-12 | End: 2024-06-12

## 2024-06-12 RX ORDER — FAMOTIDINE 10 MG/ML
20 INJECTION, SOLUTION INTRAVENOUS AS NEEDED
Status: CANCELLED | OUTPATIENT
Start: 2024-06-12

## 2024-06-12 RX ORDER — DIPHENHYDRAMINE HYDROCHLORIDE 50 MG/ML
50 INJECTION INTRAMUSCULAR; INTRAVENOUS AS NEEDED
Status: DISCONTINUED | OUTPATIENT
Start: 2024-06-12 | End: 2024-06-12 | Stop reason: HOSPADM

## 2024-06-12 RX ORDER — SODIUM CHLORIDE 9 MG/ML
20 INJECTION, SOLUTION INTRAVENOUS ONCE
Status: CANCELLED | OUTPATIENT
Start: 2024-06-12

## 2024-06-12 RX ORDER — DIPHENHYDRAMINE HYDROCHLORIDE 50 MG/ML
50 INJECTION INTRAMUSCULAR; INTRAVENOUS AS NEEDED
Status: CANCELLED | OUTPATIENT
Start: 2024-06-12

## 2024-06-12 RX ORDER — FAMOTIDINE 10 MG/ML
20 INJECTION, SOLUTION INTRAVENOUS AS NEEDED
Status: DISCONTINUED | OUTPATIENT
Start: 2024-06-12 | End: 2024-06-12 | Stop reason: HOSPADM

## 2024-06-12 RX ADMIN — IRON SUCROSE 200 MG: 20 INJECTION, SOLUTION INTRAVENOUS at 13:49

## 2024-06-12 RX ADMIN — SODIUM CHLORIDE 20 ML/HR: 9 INJECTION, SOLUTION INTRAVENOUS at 13:41

## 2024-06-14 ENCOUNTER — OFFICE VISIT (OUTPATIENT)
Dept: FAMILY MEDICINE CLINIC | Facility: CLINIC | Age: 44
End: 2024-06-14
Payer: MEDICAID

## 2024-06-14 VITALS
HEIGHT: 70 IN | RESPIRATION RATE: 20 BRPM | SYSTOLIC BLOOD PRESSURE: 145 MMHG | DIASTOLIC BLOOD PRESSURE: 81 MMHG | WEIGHT: 212 LBS | BODY MASS INDEX: 30.35 KG/M2 | HEART RATE: 65 BPM

## 2024-06-14 DIAGNOSIS — J30.89 ALLERGIC RHINITIS DUE TO OTHER ALLERGIC TRIGGER, UNSPECIFIED SEASONALITY: ICD-10-CM

## 2024-06-14 DIAGNOSIS — F41.8 SITUATIONAL ANXIETY: ICD-10-CM

## 2024-06-14 DIAGNOSIS — N92.1 MENORRHAGIA WITH IRREGULAR CYCLE: ICD-10-CM

## 2024-06-14 DIAGNOSIS — I47.10 SVT (SUPRAVENTRICULAR TACHYCARDIA): ICD-10-CM

## 2024-06-14 DIAGNOSIS — E78.1 HYPERTRIGLYCERIDEMIA: ICD-10-CM

## 2024-06-14 DIAGNOSIS — D50.8 OTHER IRON DEFICIENCY ANEMIA: Primary | ICD-10-CM

## 2024-06-14 DIAGNOSIS — I70.209 ARTERIAL OCCLUSION, LOWER EXTREMITY: ICD-10-CM

## 2024-06-14 DIAGNOSIS — F17.200 TOBACCO DEPENDENCE: ICD-10-CM

## 2024-06-14 PROCEDURE — 1160F RVW MEDS BY RX/DR IN RCRD: CPT | Performed by: NURSE PRACTITIONER

## 2024-06-14 PROCEDURE — 99214 OFFICE O/P EST MOD 30 MIN: CPT | Performed by: NURSE PRACTITIONER

## 2024-06-14 PROCEDURE — 1126F AMNT PAIN NOTED NONE PRSNT: CPT | Performed by: NURSE PRACTITIONER

## 2024-06-14 PROCEDURE — 1159F MED LIST DOCD IN RCRD: CPT | Performed by: NURSE PRACTITIONER

## 2024-06-14 RX ORDER — LORAZEPAM 0.5 MG/1
TABLET ORAL
Qty: 45 TABLET | Refills: 0 | Status: SHIPPED | OUTPATIENT
Start: 2024-06-14

## 2024-06-14 NOTE — ASSESSMENT & PLAN NOTE
Recent holter showed several break though episodes. Failed propranolol, metrolprolol tartrate in the past, having breakthough episodes on atenolol but heart rate 65 today. Consider change but she sees cardiology in 10 days so will defer to them for medication changes. Electrophysiology consult pending for Nov.

## 2024-06-14 NOTE — PROGRESS NOTES
"Chief Complaint  Anxiety, Sore Throat, and SVT    Subjective    History of Present Illness      Patient presents to [facility] for   History of Present Illness  Pt states her anxiety has improved and she is doing better. She started iron infusions Wednesday and can tell her fatigue and sob has imrproved. C/o allergy symptoms- sore throat off and on. Has questions regarding her recent holter monitor. Says she is following with her vascular specialist, gyn and now cardiology is upcoming. Heme apt went well.        Review of Systems    I have reviewed and agree with the HPI and ROS information as above.  Lela Boyle, APRN     Objective   Vital Signs:   /81   Pulse 65   Resp 20   Ht 177.8 cm (70\")   Wt 96.2 kg (212 lb)   BMI 30.42 kg/m²     BMI is >= 30 and <35. (Class 1 Obesity). The following options were offered after discussion;: exercise counseling/recommendations      Physical Exam  Constitutional:       Appearance: She is well-developed. She is obese.   HENT:      Head: Normocephalic and atraumatic.      Right Ear: Tympanic membrane, ear canal and external ear normal.      Left Ear: Tympanic membrane, ear canal and external ear normal.      Nose: Nose normal. No septal deviation, nasal tenderness or congestion.      Mouth/Throat:      Lips: Pink. No lesions.      Mouth: Mucous membranes are moist. No oral lesions.      Dentition: Normal dentition.      Pharynx: Oropharynx is clear. No pharyngeal swelling, oropharyngeal exudate or posterior oropharyngeal erythema.   Eyes:      General: Lids are normal. Vision grossly intact. No scleral icterus.        Right eye: No discharge.         Left eye: No discharge.      Extraocular Movements: Extraocular movements intact.      Conjunctiva/sclera: Conjunctivae normal.      Right eye: Right conjunctiva is not injected.      Left eye: Left conjunctiva is not injected.      Pupils: Pupils are equal, round, and reactive to light. "   Neck:      Thyroid: No thyroid mass.      Trachea: Trachea normal.   Cardiovascular:      Rate and Rhythm: Normal rate and regular rhythm.      Heart sounds: Normal heart sounds. No murmur heard.     No gallop.   Pulmonary:      Effort: Pulmonary effort is normal.      Breath sounds: Normal breath sounds and air entry. No wheezing, rhonchi or rales.   Abdominal:      General: There is no distension.      Palpations: Abdomen is soft. There is no mass.      Tenderness: There is no abdominal tenderness. There is no right CVA tenderness, left CVA tenderness, guarding or rebound.   Musculoskeletal:         General: No tenderness or deformity. Normal range of motion.      Cervical back: Full passive range of motion without pain, normal range of motion and neck supple.      Thoracic back: Normal.      Right lower leg: No edema.      Left lower leg: No edema.   Skin:     General: Skin is warm and dry.      Coloration: Skin is not jaundiced.      Findings: No rash.   Neurological:      Mental Status: She is alert and oriented to person, place, and time.      Sensory: Sensation is intact.      Motor: Motor function is intact.      Coordination: Coordination is intact.      Gait: Gait is intact.      Deep Tendon Reflexes: Reflexes are normal and symmetric.   Psychiatric:         Mood and Affect: Mood and affect normal.         Judgment: Judgment normal.          PHQ-2 Depression Screening  Little interest or pleasure in doing things? 0-->not at all   Feeling down, depressed, or hopeless? 0-->not at all   PHQ-2 Total Score 0     PHQ-9 Depression Screening  Little interest or pleasure in doing things? 0-->not at all   Feeling down, depressed, or hopeless? 0-->not at all   Trouble falling or staying asleep, or sleeping too much?     Feeling tired or having little energy?     Poor appetite or overeating?     Feeling bad about yourself - or that you are a failure or have let yourself or your family down?     Trouble concentrating  on things, such as reading the newspaper or watching television?     Moving or speaking so slowly that other people could have noticed? Or the opposite - being so fidgety or restless that you have been moving around a lot more than usual?     Thoughts that you would be better off dead, or of hurting yourself in some way?     PHQ-9 Total Score 0   If you checked off any problems, how difficult have these problems made it for you to do your work, take care of things at home, or get along with other people?        Result Review  Data Reviewed:          Triglycerides (10/10/2023 12:05)     Holter Monitor - 72 Hour Up To 15 Days (05/23/2024 10:46)      Assessment and Plan      Diagnoses and all orders for this visit:    1. Other iron deficiency anemia (Primary)  Comments:  Getting iron infusions with hematology now and is feeling significantly better.    2. SVT (supraventricular tachycardia)  Assessment & Plan:  Recent holter showed several break though episodes. Failed propranolol, metrolprolol tartrate in the past, having breakthough episodes on atenolol but heart rate 65 today. Consider change but she sees cardiology in 10 days so will defer to them for medication changes. Electrophysiology consult pending for Nov.       3. Arterial occlusion, lower extremity  Comments:  Continue Eliquis. Following with vascular.    4. Menorrhagia with irregular cycle  Comments:  Following with [facility].    5. Tobacco dependence  Comments:  Taper down to 7mg patches.  Orders:  -     nicotine (NICODERM CQ) 7 MG/24HR patch; Place 1 patch on the skin as directed by provider Daily.  Dispense: 30 patch; Refill: 0    6. Situational anxiety  Comments:  Wean ativan to 1/2 am and 1 pm prn. Wean again next month. She does not want a daily antidepressant at this time. Feels she is improving emotionally.  Orders:  -     LORazepam (Ativan) 0.5 MG tablet; 1/2 am and 1 HS as needed for anxiety  Dispense: 45 tablet; Refill: 0    7.  Hypertriglyceridemia  Comments:  Lab in 3 months, continue Tricor.    8. Allergic rhinitis due to other allergic trigger, unspecified seasonality  Comments:  Start otc allegra.            Follow Up   Return in about 3 months (around 9/14/2024).  Patient was given instructions and counseling regarding her condition or for health maintenance advice. Please see specific information pulled into the AVS if appropriate.

## 2024-06-19 ENCOUNTER — INFUSION (OUTPATIENT)
Dept: ONCOLOGY | Facility: HOSPITAL | Age: 44
End: 2024-06-19
Payer: MEDICAID

## 2024-06-19 VITALS
HEIGHT: 70 IN | SYSTOLIC BLOOD PRESSURE: 132 MMHG | BODY MASS INDEX: 30.72 KG/M2 | DIASTOLIC BLOOD PRESSURE: 68 MMHG | WEIGHT: 214.6 LBS | HEART RATE: 59 BPM | OXYGEN SATURATION: 98 % | TEMPERATURE: 96.8 F | RESPIRATION RATE: 18 BRPM

## 2024-06-19 DIAGNOSIS — D50.9 IRON DEFICIENCY ANEMIA, UNSPECIFIED IRON DEFICIENCY ANEMIA TYPE: Primary | ICD-10-CM

## 2024-06-19 PROCEDURE — 25810000003 SODIUM CHLORIDE 0.9 % SOLUTION: Performed by: INTERNAL MEDICINE

## 2024-06-19 PROCEDURE — 96374 THER/PROPH/DIAG INJ IV PUSH: CPT

## 2024-06-19 PROCEDURE — 96365 THER/PROPH/DIAG IV INF INIT: CPT

## 2024-06-19 PROCEDURE — 25010000002 IRON SUCROSE PER 1 MG: Performed by: INTERNAL MEDICINE

## 2024-06-19 RX ORDER — FAMOTIDINE 10 MG/ML
20 INJECTION, SOLUTION INTRAVENOUS AS NEEDED
OUTPATIENT
Start: 2024-07-03

## 2024-06-19 RX ORDER — DIPHENHYDRAMINE HYDROCHLORIDE 50 MG/ML
50 INJECTION INTRAMUSCULAR; INTRAVENOUS AS NEEDED
Status: DISCONTINUED | OUTPATIENT
Start: 2024-06-19 | End: 2024-06-19 | Stop reason: HOSPADM

## 2024-06-19 RX ORDER — FAMOTIDINE 10 MG/ML
20 INJECTION, SOLUTION INTRAVENOUS AS NEEDED
Status: DISCONTINUED | OUTPATIENT
Start: 2024-06-19 | End: 2024-06-19 | Stop reason: HOSPADM

## 2024-06-19 RX ORDER — SODIUM CHLORIDE 9 MG/ML
20 INJECTION, SOLUTION INTRAVENOUS ONCE
Status: COMPLETED | OUTPATIENT
Start: 2024-06-19 | End: 2024-06-19

## 2024-06-19 RX ORDER — SODIUM CHLORIDE 9 MG/ML
20 INJECTION, SOLUTION INTRAVENOUS ONCE
OUTPATIENT
Start: 2024-06-26

## 2024-06-19 RX ORDER — DIPHENHYDRAMINE HYDROCHLORIDE 50 MG/ML
50 INJECTION INTRAMUSCULAR; INTRAVENOUS AS NEEDED
OUTPATIENT
Start: 2024-06-26

## 2024-06-19 RX ORDER — FAMOTIDINE 10 MG/ML
20 INJECTION, SOLUTION INTRAVENOUS AS NEEDED
OUTPATIENT
Start: 2024-06-26

## 2024-06-19 RX ORDER — DIPHENHYDRAMINE HYDROCHLORIDE 50 MG/ML
50 INJECTION INTRAMUSCULAR; INTRAVENOUS AS NEEDED
OUTPATIENT
Start: 2024-07-03

## 2024-06-19 RX ORDER — SODIUM CHLORIDE 9 MG/ML
20 INJECTION, SOLUTION INTRAVENOUS ONCE
OUTPATIENT
Start: 2024-07-03

## 2024-06-19 RX ADMIN — IRON SUCROSE 200 MG: 20 INJECTION, SOLUTION INTRAVENOUS at 14:08

## 2024-06-19 RX ADMIN — SODIUM CHLORIDE 20 ML/HR: 9 INJECTION, SOLUTION INTRAVENOUS at 14:08

## 2024-06-25 ENCOUNTER — OFFICE VISIT (OUTPATIENT)
Dept: CARDIOLOGY | Facility: CLINIC | Age: 44
End: 2024-06-25
Payer: MEDICAID

## 2024-06-25 VITALS
HEART RATE: 60 BPM | HEIGHT: 70 IN | WEIGHT: 210 LBS | BODY MASS INDEX: 30.06 KG/M2 | DIASTOLIC BLOOD PRESSURE: 80 MMHG | SYSTOLIC BLOOD PRESSURE: 124 MMHG

## 2024-06-25 DIAGNOSIS — I47.10 SVT (SUPRAVENTRICULAR TACHYCARDIA): Primary | ICD-10-CM

## 2024-06-25 DIAGNOSIS — Z72.0 TOBACCO USE: ICD-10-CM

## 2024-06-25 DIAGNOSIS — I70.209 ARTERIAL OCCLUSION, LOWER EXTREMITY: ICD-10-CM

## 2024-06-25 DIAGNOSIS — R00.2 PALPITATIONS: ICD-10-CM

## 2024-06-25 PROCEDURE — 99204 OFFICE O/P NEW MOD 45 MIN: CPT | Performed by: INTERNAL MEDICINE

## 2024-06-25 NOTE — PROGRESS NOTES
"Chief Complaint  Establish Care    Subjective      Denise Ahn presents to [facility] CARDIOLOGY  History of Present Illness  Denise Ahn is a 44-year-old female referred for cardiology evaluation by MELLISSA Ma because of possible cardiac source of arterial embolus.  On May 7 of this year the patient awakened with a sudden onset of left toe discomfort with paresthesias.  She was found to have a left distal popliteal artery occlusion and underwent suction embolectomy/thrombectomy of left popliteal, anterior tibial, tibioperoneal trunk, peroneal and proximal posterior tibial artery by Dr. Trever Turcios, vascular surgeon.  She was treated with antithrombotic therapy and discharged on Eliquis on 9 May.  A transthoracic echocardiogram was performed and was unrevealing.  Transesophageal echocardiography as recommended by Dr. Turcios was not not performed.  A hypercoagulable workup by hematology was also unrevealing.    The patient wore an extended cardiac monitor for 6 days and 21 hours as an outpatient and was found to have 21 runs of supraventricular tachycardia of up to 43 seconds in length with an average rate of 130 bpm with fastest heart rate of 174 bpm.  There was one11-second run of wide QRS tachycardia.  This possibly represents aberrancy.  Some of the SVT runs are slightly irregular as though they could represent atrial fibrillation.    The patient has a history of having palpitations.  She has occasional episodes of fluttering in her chest which cause some anxiety.  She has not had any syncope or near syncope.  She has no anginal type chest discomfort.  There is no unusual dyspnea, PND or orthopnea.  She is a cigarette smoker but her smoking frequency is minimal.  She is a  and is not currently employed.  She has one 8-year-old child at home and a couple of grown children.        Objective   Vital Signs:  /80   Pulse 60   Ht 177.8 cm (70\")   Wt 95.3 kg (210 " "lb)   BMI 30.13 kg/m²   Estimated body mass index is 30.13 kg/m² as calculated from the following:    Height as of this encounter: 177.8 cm (70\").    Weight as of this encounter: 95.3 kg (210 lb).            Physical Exam  This is a 44-year-old female who is awake, alert and oriented.  She is in no distress.  HEENT: No scleral icterus.  She is normocephalic.  Neck: No carotid bruits or jugular venous distention.  No thyromegaly.  Lungs: Clear to auscultation.  Normal respiratory effort.  Heart: Regular rhythm without murmurs or gallops sounds.  S1 and S2 are normal.  Abdomen: No masses, tenderness or organomegaly.  Extremities: No pretibial edema.  No cyanosis.  Pedal pulses are intact.  Neurologic: No obvious focal abnormalities are noted.  Result Review :                   Assessment and Plan   Diagnoses and all orders for this visit:    1. SVT (supraventricular tachycardia) (Primary)  -     Adult Stress Echo W/ Cont or Stress Agent if Necessary Per Protocol; Future    2. Palpitations  -     Adult Stress Echo W/ Cont or Stress Agent if Necessary Per Protocol; Future    3. Arterial occlusion, lower extremity  -     Adult Stress Echo W/ Cont or Stress Agent if Necessary Per Protocol; Future    4. Tobacco use  -     Adult Stress Echo W/ Cont or Stress Agent if Necessary Per Protocol; Future    1.  Supraventricular tachycardia.  The patient has episodes of narrow complex tachycardia, some of which are somewhat irregular suggesting the possibility of atrial fibrillation.  She also has 1 episode of wide QRS tachycardia which could represent ventricular tachycardia.  A stress echocardiogram is ordered to help exclude an ischemic basis for her arrhythmias.  The patient's symptoms from these arrhythmias are tolerable and she is already on a beta-blocker for control of these arrhythmias.  At this time further adjustment is not performed until after the ischemic study.    As noted, she potentially has paroxysmal atrial " [id number] SCORE is 4 and anticoagulation is recommended.  She is on Eliquis as noted above.  No data recorded      2.  Palpitations.  Due to the arrhythmia as described above.  Again, stress echocardiogram is ordered to exclude an ischemic basis.  Further treatment may be symptom directed.    3.  Arterial embolus.  A MARISSA was not performed during the hospital stay.  This would have been useful.  At any rate, it probably would not have changed management which includes full antithrombotic therapy with Eliquis.  The patient apparently did not have any significant underlying peripheral vascular disease.  She is a smoker which could have contributed to blood clot formation.  Her hypercoagulable workup was negative.  Ongoing anticoagulation is recommended.    4.  Tobacco use.  She does not smoke very much, nevertheless any amount of smoking is too much.  She was counseled regarding the health risks of tobacco use including but not limited to all types of vascular disease (which she apparently has peripheral arterial disease now), various types of cancers and COPD, all of which could result in death.    All questions were answered.  A return visit is scheduled in 3 months.  She is encouraged to contact us for any further concerns.    Again, at present it is recommended that she continue antithrombotic therapy with Eliquis and an ischemic study in the form of a stress echocardiogram is planned in order to exclude myocardial ischemia as a potential etiology for her arrhythmias.  The arrhythmias do not require specific treatment at the present time as they are not significantly symptomatic.    Thank you for referring this pleasant lady, I appreciate the opportunity to participate in the care of your patients.      There are no Patient Instructions on file for this visit.       Follow Up   Return in about 3 months (around 9/25/2024) for Next scheduled follow up.  Patient was given instructions and counseling  regarding her condition or for health maintenance advice. Please see specific information pulled into the AVS if appropriate.

## 2024-06-26 ENCOUNTER — INFUSION (OUTPATIENT)
Dept: ONCOLOGY | Facility: HOSPITAL | Age: 44
End: 2024-06-26
Payer: MEDICAID

## 2024-06-26 VITALS
DIASTOLIC BLOOD PRESSURE: 69 MMHG | SYSTOLIC BLOOD PRESSURE: 132 MMHG | HEART RATE: 50 BPM | HEIGHT: 70 IN | OXYGEN SATURATION: 99 % | TEMPERATURE: 97.5 F | WEIGHT: 210 LBS | RESPIRATION RATE: 18 BRPM | BODY MASS INDEX: 30.06 KG/M2

## 2024-06-26 DIAGNOSIS — D50.9 IRON DEFICIENCY ANEMIA, UNSPECIFIED IRON DEFICIENCY ANEMIA TYPE: Primary | ICD-10-CM

## 2024-06-26 PROCEDURE — 25010000002 IRON SUCROSE PER 1 MG: Performed by: INTERNAL MEDICINE

## 2024-06-26 PROCEDURE — 96365 THER/PROPH/DIAG IV INF INIT: CPT

## 2024-06-26 PROCEDURE — 25810000003 SODIUM CHLORIDE 0.9 % SOLUTION: Performed by: INTERNAL MEDICINE

## 2024-06-26 PROCEDURE — 96374 THER/PROPH/DIAG INJ IV PUSH: CPT

## 2024-06-26 RX ORDER — SODIUM CHLORIDE 9 MG/ML
20 INJECTION, SOLUTION INTRAVENOUS ONCE
Status: COMPLETED | OUTPATIENT
Start: 2024-06-26 | End: 2024-06-26

## 2024-06-26 RX ORDER — DIPHENHYDRAMINE HYDROCHLORIDE 50 MG/ML
50 INJECTION INTRAMUSCULAR; INTRAVENOUS AS NEEDED
Status: DISCONTINUED | OUTPATIENT
Start: 2024-06-26 | End: 2024-06-26 | Stop reason: HOSPADM

## 2024-06-26 RX ORDER — FAMOTIDINE 10 MG/ML
20 INJECTION, SOLUTION INTRAVENOUS AS NEEDED
Status: DISCONTINUED | OUTPATIENT
Start: 2024-06-26 | End: 2024-06-26 | Stop reason: HOSPADM

## 2024-06-26 RX ADMIN — SODIUM CHLORIDE 20 ML/HR: 9 INJECTION, SOLUTION INTRAVENOUS at 09:18

## 2024-06-26 RX ADMIN — IRON SUCROSE 200 MG: 20 INJECTION, SOLUTION INTRAVENOUS at 09:18

## 2024-07-10 DIAGNOSIS — I47.10 SVT (SUPRAVENTRICULAR TACHYCARDIA): ICD-10-CM

## 2024-07-10 RX ORDER — ATENOLOL 25 MG/1
25 TABLET ORAL 2 TIMES DAILY
Qty: 180 TABLET | Refills: 0 | Status: SHIPPED | OUTPATIENT
Start: 2024-07-10

## 2024-07-10 NOTE — TELEPHONE ENCOUNTER
Rx Refill Note  Requested Prescriptions     Pending Prescriptions Disp Refills    atenolol (TENORMIN) 25 MG tablet 180 tablet 0     Sig: Take 1 tablet by mouth 2 (Two) Times a Day.      Last office visit with prescribing clinician: 6/14/2024 Office Visit with Lela Boyle APRN (06/14/2024)   Last telemedicine visit with prescribing clinician: Visit date not found   Next office visit with prescribing clinician: 9/13/2024     Office Visit with Lela Boyle APRN (06/14/2024)        Comprehensive Metabolic Panel (05/14/2024 11:15)   Gissell Kirkland RN  07/10/24, 15:38 CDT

## 2024-07-12 ENCOUNTER — TELEPHONE (OUTPATIENT)
Dept: CARDIOLOGY | Facility: CLINIC | Age: 44
End: 2024-07-12

## 2024-07-15 DIAGNOSIS — F41.8 SITUATIONAL ANXIETY: ICD-10-CM

## 2024-07-15 RX ORDER — LORAZEPAM 0.5 MG/1
TABLET ORAL
Qty: 45 TABLET | Refills: 0 | OUTPATIENT
Start: 2024-07-15

## 2024-07-15 RX ORDER — LORAZEPAM 0.5 MG/1
TABLET ORAL
Qty: 45 TABLET | Refills: 0 | Status: CANCELLED | OUTPATIENT
Start: 2024-07-15

## 2024-07-15 NOTE — TELEPHONE ENCOUNTER
Caller: Denise Ahn    Relationship: Self    Best call back number:   [phone]    What is the best time to reach you: ANYTIME    Who are you requesting to speak with (clinical staff, provider,  specific staff member): CLINICAL    What was the call regarding: PATIENT STATES SHE WOULD LIKE A CALL WHEN MEDICATION ORDER IS SENT OVER TO PHARMACY.

## 2024-07-16 DIAGNOSIS — F41.8 SITUATIONAL ANXIETY: ICD-10-CM

## 2024-07-16 NOTE — TELEPHONE ENCOUNTER
Caller: Denise Ahn    Relationship: Self    Best call back number: [phone]     What is the best time to reach you: ANY    Who are you requesting to speak with (clinical staff, provider,  specific staff member): CLINICAL    Do you know the name of the person who called: SELF    What was the call regarding: WANTS TO GET STATUS ON REFILL OF THE MEDICATION. PLEASE CALL AND ADVISE     Is it okay if the provider responds through MyChart: YES OR CALL BACK

## 2024-07-17 DIAGNOSIS — F41.8 SITUATIONAL ANXIETY: ICD-10-CM

## 2024-07-17 RX ORDER — LORAZEPAM 0.5 MG/1
TABLET ORAL
Qty: 45 TABLET | Refills: 0 | OUTPATIENT
Start: 2024-07-17

## 2024-07-17 RX ORDER — LORAZEPAM 0.5 MG/1
TABLET ORAL
Qty: 20 TABLET | Refills: 0 | Status: SHIPPED | OUTPATIENT
Start: 2024-07-17

## 2024-07-17 NOTE — TELEPHONE ENCOUNTER
Ok for a lower quant to start tapering off, discussed this was short term treatment. I will send in new script. Thanks.

## 2024-07-30 ENCOUNTER — OFFICE VISIT (OUTPATIENT)
Dept: FAMILY MEDICINE CLINIC | Facility: CLINIC | Age: 44
End: 2024-07-30
Payer: COMMERCIAL

## 2024-07-30 ENCOUNTER — LAB (OUTPATIENT)
Dept: LAB | Facility: HOSPITAL | Age: 44
End: 2024-07-30
Payer: COMMERCIAL

## 2024-07-30 VITALS
HEART RATE: 72 BPM | HEIGHT: 70 IN | SYSTOLIC BLOOD PRESSURE: 133 MMHG | BODY MASS INDEX: 29.78 KG/M2 | RESPIRATION RATE: 20 BRPM | DIASTOLIC BLOOD PRESSURE: 93 MMHG | WEIGHT: 208 LBS

## 2024-07-30 DIAGNOSIS — R39.15 URINARY URGENCY: ICD-10-CM

## 2024-07-30 DIAGNOSIS — R35.0 URINARY FREQUENCY: ICD-10-CM

## 2024-07-30 DIAGNOSIS — R31.9 URINARY TRACT INFECTION WITH HEMATURIA, SITE UNSPECIFIED: Primary | ICD-10-CM

## 2024-07-30 DIAGNOSIS — I70.209 ARTERIAL OCCLUSION, LOWER EXTREMITY: ICD-10-CM

## 2024-07-30 DIAGNOSIS — Z79.01 ANTICOAGULATED: ICD-10-CM

## 2024-07-30 DIAGNOSIS — Z87.891 FORMER SMOKER: ICD-10-CM

## 2024-07-30 DIAGNOSIS — R30.0 DYSURIA: ICD-10-CM

## 2024-07-30 DIAGNOSIS — D50.8 OTHER IRON DEFICIENCY ANEMIA: ICD-10-CM

## 2024-07-30 DIAGNOSIS — N39.0 URINARY TRACT INFECTION WITH HEMATURIA, SITE UNSPECIFIED: Primary | ICD-10-CM

## 2024-07-30 LAB
BACTERIA UR QL AUTO: ABNORMAL /HPF
BILIRUB UR QL STRIP: NEGATIVE
CLARITY UR: CLEAR
COLOR UR: YELLOW
GLUCOSE UR STRIP-MCNC: NEGATIVE MG/DL
HGB UR QL STRIP.AUTO: ABNORMAL
HYALINE CASTS UR QL AUTO: ABNORMAL /LPF
KETONES UR QL STRIP: NEGATIVE
LEUKOCYTE ESTERASE UR QL STRIP.AUTO: NEGATIVE
NITRITE UR QL STRIP: NEGATIVE
PH UR STRIP.AUTO: 5.5 [PH] (ref 5–8)
PROT UR QL STRIP: ABNORMAL
RBC # UR STRIP: ABNORMAL /HPF
REF LAB TEST METHOD: ABNORMAL
SP GR UR STRIP: >=1.03 (ref 1–1.03)
SQUAMOUS #/AREA URNS HPF: ABNORMAL /HPF
UROBILINOGEN UR QL STRIP: ABNORMAL
WBC # UR STRIP: ABNORMAL /HPF

## 2024-07-30 PROCEDURE — 99214 OFFICE O/P EST MOD 30 MIN: CPT | Performed by: NURSE PRACTITIONER

## 2024-07-30 PROCEDURE — 81001 URINALYSIS AUTO W/SCOPE: CPT

## 2024-07-30 PROCEDURE — 87086 URINE CULTURE/COLONY COUNT: CPT

## 2024-07-30 PROCEDURE — 96372 THER/PROPH/DIAG INJ SC/IM: CPT | Performed by: NURSE PRACTITIONER

## 2024-07-30 RX ORDER — SULFAMETHOXAZOLE AND TRIMETHOPRIM 800; 160 MG/1; MG/1
1 TABLET ORAL 2 TIMES DAILY
Qty: 14 TABLET | Refills: 0 | Status: SHIPPED | OUTPATIENT
Start: 2024-07-30 | End: 2024-08-06

## 2024-07-30 RX ORDER — FERRIC MALTOL 30 MG/1
1 CAPSULE ORAL 2 TIMES DAILY
Qty: 60 CAPSULE | Refills: 2 | Status: SHIPPED | OUTPATIENT
Start: 2024-07-30

## 2024-07-30 RX ORDER — CEFTRIAXONE 1 G/1
1 INJECTION, POWDER, FOR SOLUTION INTRAMUSCULAR; INTRAVENOUS EVERY 24 HOURS
Status: COMPLETED | OUTPATIENT
Start: 2024-07-30 | End: 2024-07-30

## 2024-07-30 RX ADMIN — CEFTRIAXONE 1 G: 1 INJECTION, POWDER, FOR SOLUTION INTRAMUSCULAR; INTRAVENOUS at 15:27

## 2024-07-30 NOTE — PROGRESS NOTES
After obtaining consent, and per orders of Lela MALLOY, injection of Rocephin 1G administered to L dorso gluteal IM. Given by Lucia Walls MA. Patient instructed to remain in clinic for 20 minutes afterwards, and to report any adverse reaction to me immediately. Pt tolerated well with no adverse reactions.

## 2024-07-30 NOTE — PROGRESS NOTES
"Chief Complaint  Urinary Frequency and SABRINA    Subjective    History of Present Illness      Patient presents to [facility] for   History of Present Illness  C/o urinary urgency and lower abdominal pain for a few days and frequency x about a week. Just got home from [place] and says they had to stop 5 x for her to urinate and even had to go on the side of the road at one point. She has questions regarding if she can stop her anticoag prior to a potential upcoming gyn surgery for fibroids. She also is not tolerating her oral iron replacement otc well. She recently has quit smoking.        Review of Systems    I have reviewed and agree with the HPI and ROS information as above.  Lelabrenton Boyle, MELLISSA     Objective   Vital Signs:   /93   Pulse 72   Resp 20   Ht 177.8 cm (70\")   Wt 94.3 kg (208 lb)   BMI 29.84 kg/m²            Physical Exam  Constitutional:       Appearance: She is well-developed and overweight.   HENT:      Head: Normocephalic and atraumatic.      Right Ear: Tympanic membrane, ear canal and external ear normal.      Left Ear: Tympanic membrane, ear canal and external ear normal.      Nose: Nose normal. No septal deviation, nasal tenderness or congestion.      Mouth/Throat:      Lips: Pink. No lesions.      Mouth: Mucous membranes are moist. No oral lesions.      Dentition: Normal dentition.      Pharynx: Oropharynx is clear. No pharyngeal swelling, oropharyngeal exudate or posterior oropharyngeal erythema.   Eyes:      General: Lids are normal. Vision grossly intact. No scleral icterus.        Right eye: No discharge.         Left eye: No discharge.      Extraocular Movements: Extraocular movements intact.      Conjunctiva/sclera: Conjunctivae normal.      Right eye: Right conjunctiva is not injected.      Left eye: Left conjunctiva is not injected.      Pupils: Pupils are equal, round, and reactive to light.   Neck:      Thyroid: No thyroid mass.      Trachea: " Trachea normal.   Cardiovascular:      Rate and Rhythm: Normal rate and regular rhythm.      Heart sounds: Normal heart sounds. No murmur heard.     No gallop.   Pulmonary:      Effort: Pulmonary effort is normal.      Breath sounds: Normal breath sounds and air entry. No wheezing, rhonchi or rales.   Abdominal:      General: There is no distension.      Palpations: Abdomen is soft. There is no mass.      Tenderness: There is no abdominal tenderness in the suprapubic area. There is no right CVA tenderness, left CVA tenderness, guarding or rebound.   Musculoskeletal:         General: No tenderness or deformity. Normal range of motion.      Cervical back: Full passive range of motion without pain, normal range of motion and neck supple.      Thoracic back: Normal.      Right lower leg: No edema.      Left lower leg: No edema.   Skin:     General: Skin is warm and dry.      Coloration: Skin is not jaundiced.      Findings: No rash.   Neurological:      Mental Status: She is alert and oriented to person, place, and time.      Sensory: Sensation is intact.      Motor: Motor function is intact.      Coordination: Coordination is intact.      Gait: Gait is intact.      Deep Tendon Reflexes: Reflexes are normal and symmetric.   Psychiatric:         Mood and Affect: Mood and affect normal.         Judgment: Judgment normal.              PHQ-2 Depression Screening  Little interest or pleasure in doing things? 0-->not at all   Feeling down, depressed, or hopeless? 0-->not at all   PHQ-2 Total Score 0     PHQ-9 Depression Screening  Little interest or pleasure in doing things? 0-->not at all   Feeling down, depressed, or hopeless? 0-->not at all   Trouble falling or staying asleep, or sleeping too much?     Feeling tired or having little energy?     Poor appetite or overeating?     Feeling bad about yourself - or that you are a failure or have let yourself or your family down?     Trouble concentrating on things, such as  reading the newspaper or watching television?     Moving or speaking so slowly that other people could have noticed? Or the opposite - being so fidgety or restless that you have been moving around a lot more than usual?     Thoughts that you would be better off dead, or of hurting yourself in some way?     PHQ-9 Total Score 0   If you checked off any problems, how difficult have these problems made it for you to do your work, take care of things at home, or get along with other people?        Result Review  Data Reviewed:              Urinalysis With Culture If Indicated - Urine, Clean Catch (07/30/2024 14:24)  Urinalysis, Microscopic Only - Urine, Clean Catch (07/30/2024 14:24)     Assessment and Plan      Diagnoses and all orders for this visit:    1. Urinary tract infection with hematuria, site unspecified (Primary)  Comments:  UA reviewed and discussed, she is on her period currently. 1+bacteria and numerous wbc's. Treat with Que and bactrim.  Orders:  -     Urinalysis With Culture If Indicated -; Future  -     cefTRIAXone (ROCEPHIN) injection 1 g  -     sulfamethoxazole-trimethoprim (Bactrim DS) 800-160 MG per tablet; Take 1 tablet by mouth 2 (Two) Times a Day for 7 days.  Dispense: 14 tablet; Refill: 0    2. Urinary frequency  -     sulfamethoxazole-trimethoprim (Bactrim DS) 800-160 MG per tablet; Take 1 tablet by mouth 2 (Two) Times a Day for 7 days.  Dispense: 14 tablet; Refill: 0    3. Urinary urgency  -     sulfamethoxazole-trimethoprim (Bactrim DS) 800-160 MG per tablet; Take 1 tablet by mouth 2 (Two) Times a Day for 7 days.  Dispense: 14 tablet; Refill: 0    4. Other iron deficiency anemia  Comments:  Not tolerating otc iron well. Will trial accrufer.  Orders:  -     Ferric Maltol (ACCRUFeR) 30 MG capsule; Take 1 capsule by mouth 2 (Two) Times a Day.  Dispense: 60 capsule; Refill: 2    5. Former smoker    6. Anticoagulated  Comments:  Recommended continuing anticoag during cardiology workup-testing still  pending.    7. Arterial occlusion, lower extremity            Follow Up   Return if symptoms worsen or fail to improve.  Patient was given instructions and counseling regarding her condition or for health maintenance advice. Please see specific information pulled into the AVS if appropriate.

## 2024-07-31 LAB — BACTERIA SPEC AEROBE CULT: NO GROWTH

## 2024-08-05 ENCOUNTER — TELEPHONE (OUTPATIENT)
Dept: FAMILY MEDICINE CLINIC | Facility: CLINIC | Age: 44
End: 2024-08-05
Payer: COMMERCIAL

## 2024-08-05 NOTE — TELEPHONE ENCOUNTER
----- Message from Lela Boyle sent at 8/5/2024  7:28 AM CDT -----  Please let pt know results: no growth on urine culture, hope she is feeling better.

## 2024-08-05 NOTE — TELEPHONE ENCOUNTER
Sent pt [employer] message relaying below    HUB TO RELAY  Your urine culture did not grow anything. Lela says she hopes you're feeling better. Please let me know if you have any questions.

## 2024-08-06 ENCOUNTER — HOSPITAL ENCOUNTER (OUTPATIENT)
Dept: CARDIOLOGY | Facility: HOSPITAL | Age: 44
Discharge: HOME | End: 2024-08-06
Payer: COMMERCIAL

## 2024-08-08 ENCOUNTER — HOSPITAL ENCOUNTER (EMERGENCY)
Facility: HOSPITAL | Age: 44
Discharge: HOME | End: 2024-08-08
Attending: INTERNAL MEDICINE
Payer: COMMERCIAL

## 2024-08-08 ENCOUNTER — APPOINTMENT (OUTPATIENT)
Dept: CT IMAGING | Facility: HOSPITAL | Age: 44
End: 2024-08-08
Payer: COMMERCIAL

## 2024-08-08 VITALS
SYSTOLIC BLOOD PRESSURE: 125 MMHG | HEIGHT: 70 IN | HEART RATE: 65 BPM | WEIGHT: 207.5 LBS | OXYGEN SATURATION: 98 % | TEMPERATURE: 98.5 F | DIASTOLIC BLOOD PRESSURE: 89 MMHG | RESPIRATION RATE: 18 BRPM | BODY MASS INDEX: 29.71 KG/M2

## 2024-08-08 DIAGNOSIS — N30.01 ACUTE CYSTITIS WITH HEMATURIA: ICD-10-CM

## 2024-08-08 DIAGNOSIS — N20.1 LEFT URETERAL STONE: Primary | ICD-10-CM

## 2024-08-08 LAB
ALBUMIN SERPL-MCNC: 4.3 G/DL (ref 3.5–5.2)
ALBUMIN/GLOB SERPL: 1.5 G/DL
ALP SERPL-CCNC: 63 U/L (ref 39–117)
ALT SERPL W P-5'-P-CCNC: 43 U/L (ref 1–33)
ANION GAP SERPL CALCULATED.3IONS-SCNC: 10 MMOL/L (ref 5–15)
AST SERPL-CCNC: 31 U/L (ref 1–32)
B-HCG UR QL: NEGATIVE
BACTERIA UR QL AUTO: ABNORMAL /HPF
BASOPHILS # BLD AUTO: 0.04 10*3/MM3 (ref 0–0.2)
BASOPHILS NFR BLD AUTO: 0.4 % (ref 0–1.5)
BILIRUB SERPL-MCNC: <0.2 MG/DL (ref 0–1.2)
BILIRUB UR QL STRIP: ABNORMAL
BUN SERPL-MCNC: 15 MG/DL (ref 6–20)
BUN/CREAT SERPL: 17.9 (ref 7–25)
CALCIUM SPEC-SCNC: 9.8 MG/DL (ref 8.6–10.5)
CHLORIDE SERPL-SCNC: 100 MMOL/L (ref 98–107)
CLARITY UR: CLEAR
CO2 SERPL-SCNC: 24 MMOL/L (ref 22–29)
COLOR UR: ABNORMAL
CREAT SERPL-MCNC: 0.84 MG/DL (ref 0.57–1)
D-LACTATE SERPL-SCNC: 1.3 MMOL/L (ref 0.5–2)
DEPRECATED RDW RBC AUTO: 57.4 FL (ref 37–54)
EGFRCR SERPLBLD CKD-EPI 2021: 89.1 ML/MIN/1.73
EOSINOPHIL # BLD AUTO: 0.06 10*3/MM3 (ref 0–0.4)
EOSINOPHIL NFR BLD AUTO: 0.5 % (ref 0.3–6.2)
ERYTHROCYTE [DISTWIDTH] IN BLOOD BY AUTOMATED COUNT: 18.2 % (ref 12.3–15.4)
GLOBULIN UR ELPH-MCNC: 2.9 GM/DL
GLUCOSE SERPL-MCNC: 114 MG/DL (ref 65–99)
GLUCOSE UR STRIP-MCNC: NEGATIVE MG/DL
HCT VFR BLD AUTO: 36 % (ref 34–46.6)
HGB BLD-MCNC: 11.3 G/DL (ref 12–15.9)
HGB UR QL STRIP.AUTO: ABNORMAL
HYALINE CASTS UR QL AUTO: ABNORMAL /LPF
IMM GRANULOCYTES # BLD AUTO: 0.05 10*3/MM3 (ref 0–0.05)
IMM GRANULOCYTES NFR BLD AUTO: 0.5 % (ref 0–0.5)
KETONES UR QL STRIP: NEGATIVE
LEUKOCYTE ESTERASE UR QL STRIP.AUTO: ABNORMAL
LYMPHOCYTES # BLD AUTO: 1.65 10*3/MM3 (ref 0.7–3.1)
LYMPHOCYTES NFR BLD AUTO: 14.9 % (ref 19.6–45.3)
MCH RBC QN AUTO: 26.5 PG (ref 26.6–33)
MCHC RBC AUTO-ENTMCNC: 31.4 G/DL (ref 31.5–35.7)
MCV RBC AUTO: 84.5 FL (ref 79–97)
MONOCYTES # BLD AUTO: 0.73 10*3/MM3 (ref 0.1–0.9)
MONOCYTES NFR BLD AUTO: 6.6 % (ref 5–12)
NEUTROPHILS NFR BLD AUTO: 77.1 % (ref 42.7–76)
NEUTROPHILS NFR BLD AUTO: 8.57 10*3/MM3 (ref 1.7–7)
NITRITE UR QL STRIP: POSITIVE
NRBC BLD AUTO-RTO: 0 /100 WBC (ref 0–0.2)
PH UR STRIP.AUTO: <=5 [PH] (ref 5–8)
PLATELET # BLD AUTO: 199 10*3/MM3 (ref 140–450)
PMV BLD AUTO: 9.6 FL (ref 6–12)
POTASSIUM SERPL-SCNC: 4.5 MMOL/L (ref 3.5–5.2)
PROT SERPL-MCNC: 7.2 G/DL (ref 6–8.5)
PROT UR QL STRIP: ABNORMAL
RBC # BLD AUTO: 4.26 10*6/MM3 (ref 3.77–5.28)
RBC # UR STRIP: ABNORMAL /HPF
REF LAB TEST METHOD: ABNORMAL
SODIUM SERPL-SCNC: 134 MMOL/L (ref 136–145)
SP GR UR STRIP: 1.02 (ref 1–1.03)
SQUAMOUS #/AREA URNS HPF: ABNORMAL /HPF
UROBILINOGEN UR QL STRIP: ABNORMAL
WBC # UR STRIP: ABNORMAL /HPF
WBC NRBC COR # BLD AUTO: 11.1 10*3/MM3 (ref 3.4–10.8)

## 2024-08-08 PROCEDURE — 25010000002 ONDANSETRON PER 1 MG: Performed by: INTERNAL MEDICINE

## 2024-08-08 PROCEDURE — 81025 URINE PREGNANCY TEST: CPT | Performed by: INTERNAL MEDICINE

## 2024-08-08 PROCEDURE — 25010000002 HYDROMORPHONE PER 4 MG: Performed by: INTERNAL MEDICINE

## 2024-08-08 PROCEDURE — 85025 COMPLETE CBC W/AUTO DIFF WBC: CPT | Performed by: INTERNAL MEDICINE

## 2024-08-08 PROCEDURE — 80053 COMPREHEN METABOLIC PANEL: CPT | Performed by: INTERNAL MEDICINE

## 2024-08-08 PROCEDURE — 25510000001 IOPAMIDOL 61 % SOLUTION: Performed by: INTERNAL MEDICINE

## 2024-08-08 PROCEDURE — 96375 TX/PRO/DX INJ NEW DRUG ADDON: CPT

## 2024-08-08 PROCEDURE — 83605 ASSAY OF LACTIC ACID: CPT | Performed by: INTERNAL MEDICINE

## 2024-08-08 PROCEDURE — 99285 EMERGENCY DEPT VISIT HI MDM: CPT

## 2024-08-08 PROCEDURE — 25810000003 LACTATED RINGERS SOLUTION: Performed by: INTERNAL MEDICINE

## 2024-08-08 PROCEDURE — 81001 URINALYSIS AUTO W/SCOPE: CPT | Performed by: INTERNAL MEDICINE

## 2024-08-08 PROCEDURE — 74177 CT ABD & PELVIS W/CONTRAST: CPT

## 2024-08-08 PROCEDURE — 96365 THER/PROPH/DIAG IV INF INIT: CPT

## 2024-08-08 PROCEDURE — 25010000002 CEFTRIAXONE PER 250 MG: Performed by: INTERNAL MEDICINE

## 2024-08-08 RX ORDER — TAMSULOSIN HYDROCHLORIDE 0.4 MG/1
1 CAPSULE ORAL 2 TIMES DAILY
Qty: 10 CAPSULE | Refills: 0 | Status: SHIPPED | OUTPATIENT
Start: 2024-08-08 | End: 2024-08-09 | Stop reason: SDUPTHER

## 2024-08-08 RX ORDER — OXYCODONE AND ACETAMINOPHEN 7.5; 325 MG/1; MG/1
1 TABLET ORAL ONCE
Status: COMPLETED | OUTPATIENT
Start: 2024-08-08 | End: 2024-08-08

## 2024-08-08 RX ORDER — HYDROMORPHONE HYDROCHLORIDE 1 MG/ML
0.5 INJECTION, SOLUTION INTRAMUSCULAR; INTRAVENOUS; SUBCUTANEOUS ONCE
Status: COMPLETED | OUTPATIENT
Start: 2024-08-08 | End: 2024-08-08

## 2024-08-08 RX ORDER — CEFDINIR 300 MG/1
300 CAPSULE ORAL 2 TIMES DAILY
Qty: 14 CAPSULE | Refills: 0 | Status: SHIPPED | OUTPATIENT
Start: 2024-08-08

## 2024-08-08 RX ORDER — ONDANSETRON 2 MG/ML
4 INJECTION INTRAMUSCULAR; INTRAVENOUS ONCE
Status: COMPLETED | OUTPATIENT
Start: 2024-08-08 | End: 2024-08-08

## 2024-08-08 RX ORDER — OXYCODONE AND ACETAMINOPHEN 7.5; 325 MG/1; MG/1
1 TABLET ORAL EVERY 6 HOURS PRN
Qty: 12 TABLET | Refills: 0 | Status: SHIPPED | OUTPATIENT
Start: 2024-08-08 | End: 2024-08-12

## 2024-08-08 RX ORDER — ONDANSETRON 4 MG/1
4 TABLET, ORALLY DISINTEGRATING ORAL EVERY 8 HOURS PRN
Qty: 21 TABLET | Refills: 0 | Status: SHIPPED | OUTPATIENT
Start: 2024-08-08

## 2024-08-08 RX ADMIN — IOPAMIDOL 100 ML: 612 INJECTION, SOLUTION INTRAVENOUS at 20:55

## 2024-08-08 RX ADMIN — HYDROMORPHONE HYDROCHLORIDE 0.5 MG: 1 INJECTION, SOLUTION INTRAMUSCULAR; INTRAVENOUS; SUBCUTANEOUS at 19:56

## 2024-08-08 RX ADMIN — OXYCODONE HYDROCHLORIDE AND ACETAMINOPHEN 1 TABLET: 7.5; 325 TABLET ORAL at 22:05

## 2024-08-08 RX ADMIN — ONDANSETRON 4 MG: 2 INJECTION INTRAMUSCULAR; INTRAVENOUS at 19:55

## 2024-08-08 RX ADMIN — SODIUM CHLORIDE, POTASSIUM CHLORIDE, SODIUM LACTATE AND CALCIUM CHLORIDE 1000 ML: 600; 310; 30; 20 INJECTION, SOLUTION INTRAVENOUS at 20:00

## 2024-08-08 RX ADMIN — SODIUM CHLORIDE 1000 MG: 900 INJECTION INTRAVENOUS at 22:05

## 2024-08-09 ENCOUNTER — HOSPITAL ENCOUNTER (OUTPATIENT)
Dept: GENERAL RADIOLOGY | Facility: HOSPITAL | Age: 44
Discharge: HOME | End: 2024-08-09
Payer: COMMERCIAL

## 2024-08-09 ENCOUNTER — OFFICE VISIT (OUTPATIENT)
Dept: UROLOGY | Facility: CLINIC | Age: 44
End: 2024-08-09
Payer: COMMERCIAL

## 2024-08-09 VITALS — TEMPERATURE: 97.4 F | HEIGHT: 70 IN | WEIGHT: 207.8 LBS | BODY MASS INDEX: 29.75 KG/M2

## 2024-08-09 DIAGNOSIS — N20.1 URETERAL STONE: Primary | ICD-10-CM

## 2024-08-09 DIAGNOSIS — N20.1 URETERAL STONE: ICD-10-CM

## 2024-08-09 LAB
BILIRUB BLD-MCNC: NEGATIVE MG/DL
CLARITY, POC: CLEAR
COLOR UR: YELLOW
GLUCOSE UR STRIP-MCNC: NEGATIVE MG/DL
KETONES UR QL: NEGATIVE
LEUKOCYTE EST, POC: NEGATIVE
NITRITE UR-MCNC: NEGATIVE MG/ML
PH UR: 5.5 [PH] (ref 5–8)
PROT UR STRIP-MCNC: NEGATIVE MG/DL
RBC # UR STRIP: ABNORMAL /UL
SP GR UR: 1.02 (ref 1–1.03)
UROBILINOGEN UR QL: ABNORMAL

## 2024-08-09 PROCEDURE — 74018 RADEX ABDOMEN 1 VIEW: CPT

## 2024-08-09 RX ORDER — TAMSULOSIN HYDROCHLORIDE 0.4 MG/1
1 CAPSULE ORAL 2 TIMES DAILY
Qty: 60 CAPSULE | Refills: 0 | Status: SHIPPED | OUTPATIENT
Start: 2024-08-09

## 2024-08-09 NOTE — PROGRESS NOTES
Subjective    Ms. Ahn is 44 y.o. female    Chief Complaint: Left ureteral stone    History of Present Illness    44-year-old female established patient in with new complaint of left flank pain was seen at [facility] ER yesterday after getting back home from a trip to [place] when the pain initially started accompanied with urinary urgency.  Was found to have a 4 mm left UVJ obstructing stone.  Previous history of kidney stones last treated right ESWL with stent placement 3/2022 for a 1.5 cm right renal pelvic stone by Dr. Chavez.  Was treated during ER visit with IV antibiotic therapy and was sent home on cefdinir along with tamsulosin and Percocet.  Patient reports a history back in May of a DVT in her lower extremity therefore is on Eliquis and is unable to use NSAIDs.       I independently visualized and reviewed the patient's prior imaging studies today in clinic and discussed the imaging findings with the patient.    The following portions of the patient's history were reviewed and updated as appropriate: allergies, current medications, past family history, past medical history, past social history, past surgical history and problem list.    Review of Systems   Constitutional:  Negative for chills and fever.   Gastrointestinal:  Negative for abdominal pain, anal bleeding, blood in stool, nausea and vomiting.   Genitourinary:  Positive for flank pain and urgency. Negative for dysuria and hematuria.         Current Outpatient Medications:     apixaban (ELIQUIS) 5 MG tablet tablet, Take 1 tablet by mouth Every 12 (Twelve) Hours. Indications: Other - full anticoagulation, Disp: 60 tablet, Rfl: 2    atenolol (TENORMIN) 25 MG tablet, Take 1 tablet by mouth 2 (Two) Times a Day., Disp: 180 tablet, Rfl: 0    cefdinir (OMNICEF) 300 MG capsule, Take 1 capsule by mouth 2 (Two) Times a Day., Disp: 14 capsule, Rfl: 0    fenofibrate (TRICOR) 145 MG tablet, Take 1 tablet by mouth Daily., Disp: 30 tablet, Rfl: 2    Ferric Maltol  (ACCRUFeR) 30 MG capsule, Take 1 capsule by mouth 2 (Two) Times a Day., Disp: 60 capsule, Rfl: 2    ferrous gluconate (FERGON) 324 MG tablet, Take 1 tablet by mouth 2 (Two) Times a Day., Disp: 60 tablet, Rfl: 2    LORazepam (Ativan) 0.5 MG tablet, 1/2 am and 1 HS as needed for anxiety, Disp: 20 tablet, Rfl: 0    multivitamin with minerals tablet tablet, Take 1 tablet by mouth Daily., Disp: , Rfl:     nicotine (NICODERM CQ) 7 MG/24HR patch, Place 1 patch on the skin as directed by provider Daily., Disp: 30 patch, Rfl: 0    ondansetron ODT (ZOFRAN-ODT) 4 MG disintegrating tablet, Place 1 tablet on the tongue Every 8 (Eight) Hours As Needed for Nausea or Vomiting., Disp: 21 tablet, Rfl: 0    oxyCODONE-acetaminophen (Percocet) 7.5-325 MG per tablet, Take 1 tablet by mouth Every 6 (Six) Hours As Needed for Moderate Pain., Disp: 12 tablet, Rfl: 0    tamsulosin (FLOMAX) 0.4 MG capsule 24 hr capsule, Take 1 capsule by mouth 2 (Two) Times a Day., Disp: 60 capsule, Rfl: 0  No current facility-administered medications for this visit.    Past Medical History:   Diagnosis Date    Graves disease     Kidney stone     SVT (supraventricular tachycardia)        Past Surgical History:   Procedure Laterality Date    APPENDECTOMY      ARTERIOGRAM LOWER EXTREMITY Left 2024    Procedure: ARTERIOGRAM LOWER EXTREMITY, SUCTION THROMBECTOMY LEFT POPLITEAL ARTERY, BALLOON ANGIOPLASTY LEFT POSTERIOR TIBIAL ARTERY;  Surgeon: Trever Turcios MD;  Location:  [facility];  Service: Vascular;  Laterality: Left;     SECTION      ENDOSCOPY      EXTRACORPOREAL SHOCKWAVE LITHOTRIPSY (ESWL), STENT INSERTION/REMOVAL Right 3/23/2022    Procedure: EXTRACORPOREAL SHOCKWAVE LITHOTRIPSY  RIGHT CYSTO STENT PLACEMENT;  Surgeon: Michele Chavez MD;  Location:  [facility] OR;  Service: Urology;  Laterality: Right;    TUBAL ABDOMINAL LIGATION         Social History     Socioeconomic History    Marital status:    Tobacco Use    Smoking  "status: Some Days     Current packs/day: 1.00     Average packs/day: 1 pack/day for 13.0 years (13.0 ttl pk-yrs)     Types: Cigarettes    Smokeless tobacco: Never   Vaping Use    Vaping status: Never Used   Substance and Sexual Activity    Alcohol use: Yes     Comment: recreational    Drug use: Never    Sexual activity: Defer       Family History   Problem Relation Age of Onset    Breast cancer Paternal Aunt        Objective    Temp 97.4 °F (36.3 °C)   Ht 177.8 cm (70\")   Wt 94.3 kg (207 lb 12.8 oz)   LMP 08/02/2024 (Approximate)   BMI 29.82 kg/m²     Physical Exam  Nursing note reviewed.   Constitutional:       General: She is not in acute distress.     Appearance: Normal appearance. She is not ill-appearing.   HENT:      Nose: No congestion.   Abdominal:      Tenderness: There is left CVA tenderness. There is no right CVA tenderness.      Hernia: No hernia is present.   Skin:     General: Skin is warm and dry.   Neurological:      Mental Status: She is alert and oriented to person, place, and time.   Psychiatric:         Mood and Affect: Mood normal.         Behavior: Behavior normal.             Results for orders placed or performed in visit on 08/09/24   POC Urinalysis Dipstick, Multipro    Specimen: Urine   Result Value Ref Range    Color Yellow Yellow, Straw, Dark Yellow, [place]    Clarity, UA Clear Clear    Glucose, UA Negative Negative mg/dL    Bilirubin Negative Negative    Ketones, UA Negative Negative    Specific Gravity  1.025 1.005 - 1.030    Blood, UA Trace (A) Negative    pH, Urine 5.5 5.0 - 8.0    Protein, POC Negative Negative mg/dL    Urobilinogen, UA 0.2 E.U./dL Normal, 0.2 E.U./dL    Nitrite, UA Negative Negative    Leukocytes Negative Negative   KUB independent review    A KUB is available for me to review today.  The image is inspected for a bowel gas pattern and the general bone structure of the spine and pelvis. The kidneys are then inspected closely.  Renal outline is noted if " identifiable. The kidney, collecting system, and anticipated path of the ureter are examined for calcifications including those in the true pelvis.  This film reveals:    On the right there are no calcificaitons seen in the kidney or the expected course of the ureter. .    On the left there is a single distal ureteral stone measuring 4 mm.  CT Abdomen Pelvis With Contrast (08/08/2024 20:55)   Assessment and Plan    Diagnoses and all orders for this visit:    1. Ureteral stone (Primary)  -     POC Urinalysis Dipstick, Multipro  -     tamsulosin (FLOMAX) 0.4 MG capsule 24 hr capsule; Take 1 capsule by mouth 2 (Two) Times a Day.  Dispense: 60 capsule; Refill: 0  -     XR abdomen kub; Future        KUB today left ureteral stone still visible    After lengthy discussion with patient patient would like to continue to try expulsive therapy based on the size of stone.  Will send in more tamsulosin.  Have encouraged increasing fluid intake and will have patient return in 3 weeks with KUB prior

## 2024-08-09 NOTE — ED PROVIDER NOTES
Subjective   History of Present Illness  44-year-old female who presents to the emergency department with left-sided flank pain.  She has no nausea.  She notes diaphoresis earlier.  She denies fever or chills.  She states the pain started around 3:00 this afternoon.  She has recent urinary tract infection and just completed a course of antibiotics.  She also has history of a stone.  She noted frequency and urgency to urinate.  She initially described her pain 10 out of 10, but is improved some.  She did start Azo at home.    Review of Systems   Constitutional:  Negative for chills and fever.   Gastrointestinal:  Negative for nausea.   Genitourinary:  Positive for dysuria, flank pain, frequency and hematuria.       Past Medical History:   Diagnosis Date    Graves disease     Kidney stone     SVT (supraventricular tachycardia)        No Known Allergies    Past Surgical History:   Procedure Laterality Date    APPENDECTOMY      ARTERIOGRAM LOWER EXTREMITY Left 2024    Procedure: ARTERIOGRAM LOWER EXTREMITY, SUCTION THROMBECTOMY LEFT POPLITEAL ARTERY, BALLOON ANGIOPLASTY LEFT POSTERIOR TIBIAL ARTERY;  Surgeon: Trever Turcios MD;  Location: [facility] HY[facility] OR;  Service: Vascular;  Laterality: Left;     SECTION      ENDOSCOPY      EXTRACORPOREAL SHOCKWAVE LITHOTRIPSY (ESWL), STENT INSERTION/REMOVAL Right 3/23/2022    Procedure: EXTRACORPOREAL SHOCKWAVE LITHOTRIPSY  RIGHT CYSTO STENT PLACEMENT;  Surgeon: Michele Chavez MD;  Location: [facility] OR;  Service: Urology;  Laterality: Right;    TUBAL ABDOMINAL LIGATION         Family History   Problem Relation Age of Onset    Breast cancer Paternal Aunt        Social History     Socioeconomic History    Marital status:    Tobacco Use    Smoking status: Some Days     Current packs/day: 1.00     Average packs/day: 1 pack/day for 13.0 years (13.0 ttl pk-yrs)     Types: Cigarettes    Smokeless tobacco: Never   Vaping Use    Vaping status: Never Used   Substance  and Sexual Activity    Alcohol use: Yes     Comment: recreational    Drug use: Never    Sexual activity: Defer           Objective   Physical Exam  Vitals reviewed.   Constitutional:       Appearance: She is ill-appearing.   HENT:      Head: Normocephalic and atraumatic.      Right Ear: External ear normal.      Left Ear: External ear normal.      Nose: Nose normal.   Eyes:      General: No scleral icterus.     Conjunctiva/sclera: Conjunctivae normal.   Cardiovascular:      Rate and Rhythm: Normal rate and regular rhythm.      Heart sounds: Normal heart sounds.   Pulmonary:      Effort: Pulmonary effort is normal.      Breath sounds: Normal breath sounds.   Abdominal:      Palpations: Abdomen is soft.      Tenderness: There is abdominal tenderness.   Musculoskeletal:         General: No swelling or tenderness.      Cervical back: Normal range of motion and neck supple.   Skin:     General: Skin is warm and dry.   Neurological:      Mental Status: She is alert and oriented to person, place, and time.      Cranial Nerves: No cranial nerve deficit.   Psychiatric:         Mood and Affect: Mood normal.         Behavior: Behavior normal.         Procedures       Lab Results (last 24 hours)       Procedure Component Value Units Date/Time    CBC & Differential [[id number]]  (Abnormal) Collected: 08/08/24 1954    Specimen: Blood Updated: 08/08/24 2021    Narrative:      The following orders were created for panel order CBC & Differential.  Procedure                               Abnormality         Status                     ---------                               -----------         ------                     CBC Auto Differential[[id number]]        Abnormal            Final result                 Please view results for these tests on the individual orders.    Comprehensive Metabolic Panel [[id number]]  (Abnormal) Collected: 08/08/24 1954    Specimen: Blood Updated: 08/08/24 2041     Glucose 114 mg/dL      BUN 15 mg/dL       Creatinine 0.84 mg/dL      Sodium 134 mmol/L      Potassium 4.5 mmol/L      Chloride 100 mmol/L      CO2 24.0 mmol/L      Calcium 9.8 mg/dL      Total Protein 7.2 g/dL      Albumin 4.3 g/dL      ALT (SGPT) 43 U/L      AST (SGOT) 31 U/L      Alkaline Phosphatase 63 U/L      Total Bilirubin <0.2 mg/dL      Globulin 2.9 gm/dL      A/G Ratio 1.5 g/dL      BUN/Creatinine Ratio 17.9     Anion Gap 10.0 mmol/L      eGFR 89.1 mL/min/1.73     Narrative:      GFR Normal >60  Chronic Kidney Disease <60  Kidney Failure <15      Lactic Acid, Plasma [[id number]]  (Normal) Collected: 08/08/24 1954    Specimen: Blood Updated: 08/08/24 2039     Lactate 1.3 mmol/L     CBC Auto Differential [[id number]]  (Abnormal) Collected: 08/08/24 1954    Specimen: Blood Updated: 08/08/24 2021     WBC 11.10 10*3/mm3      RBC 4.26 10*6/mm3      Hemoglobin 11.3 g/dL      Hematocrit 36.0 %      MCV 84.5 fL      MCH 26.5 pg      MCHC 31.4 g/dL      RDW 18.2 %      RDW-SD 57.4 fl      MPV 9.6 fL      Platelets 199 10*3/mm3      Neutrophil % 77.1 %      Lymphocyte % 14.9 %      Monocyte % 6.6 %      Eosinophil % 0.5 %      Basophil % 0.4 %      Immature Grans % 0.5 %      Neutrophils, Absolute 8.57 10*3/mm3      Lymphocytes, Absolute 1.65 10*3/mm3      Monocytes, Absolute 0.73 10*3/mm3      Eosinophils, Absolute 0.06 10*3/mm3      Basophils, Absolute 0.04 10*3/mm3      Immature Grans, Absolute 0.05 10*3/mm3      nRBC 0.0 /100 WBC     Urinalysis With Culture If Indicated - Urine, Clean Catch [[id number]]  (Abnormal) Collected: 08/08/24 2006    Specimen: Urine, Clean Catch Updated: 08/08/24 2026     Color, UA [place]     Appearance, UA Clear     pH, UA <=5.0     Specific Gravity, UA 1.024     Glucose, UA Negative     Ketones, UA Negative     Bilirubin, UA Small (1+)     Blood, UA Large (3+)     Protein, UA 30 mg/dL (1+)     Leuk Esterase, UA Small (1+)     Nitrite, UA Positive     Urobilinogen, UA 1.0 E.U./dL    Narrative:      Dipstick results may be  inaccurate due to color interference.    Pregnancy, Urine - Urine, Clean Catch [[id number]]  (Normal) Collected: 08/08/24 2006    Specimen: Urine, Clean Catch Updated: 08/08/24 2027     HCG, Urine QL Negative    Urinalysis, Microscopic Only - Urine, Clean Catch [[id number]]  (Abnormal) Collected: 08/08/24 2006    Specimen: Urine, Clean Catch Updated: 08/08/24 2026     RBC, UA Too Numerous to Count /HPF      WBC, UA 3-5 /HPF      Comment: Urine culture not indicated.        Bacteria, UA None Seen /HPF      Squamous Epithelial Cells, UA 0-2 /HPF      Hyaline Casts, UA 0-2 /LPF      Methodology Automated Microscopy          CT Abdomen Pelvis With Contrast   Final Result   1. 0.4 cm stone in the distal left ureter with mild left   hydroureteronephrosis, perinephric, and periureteral stranding.   2. 3.2 cm left ovarian cyst.   3. Umbilical hernia containing fat.   4. Fatty infiltrated liver.           This report was signed and finalized on 8/8/2024 9:12 PM by Paulie Rowland.                ED Course  ED Course as of 08/08/24 2159   Thu Aug 08, 2024   2155 I spoke with the patient.  We reviewed her labs.  Reviewed her CT.  Ask her to strain her urine.  She notes currently her pain is controlled.  Asked her to call the urology office tomorrow.  Rocephin has been ordered.  Will call in pain medication, antibiotics, nausea medication to her pharmacy.  The patient will be discharged home in stable condition with outpatient urology follow-up.  And is aware of her ovarian cyst. [AJ]      ED Course User Index  [AJ] Leeanna Sanders,                                              Medical Decision Making  Differential diagnosis and kilonewtons UTI, pyelonephritis, ureteral stone    Problems Addressed:  Acute cystitis with hematuria: complicated acute illness or injury  Left ureteral stone: complicated acute illness or injury    Amount and/or Complexity of Data Reviewed  Labs: ordered.     Details: CBC CMP lactic  UA  Radiology: ordered.     Details: CT abdomen pelvis    Risk  Prescription drug management.        Final diagnoses:   Left ureteral stone   Acute cystitis with hematuria       ED Disposition  ED Disposition       ED Disposition   Discharge    Condition   Stable    Comment   --               Lela Boyle, APRN  [address]  [address]  [place] [id number] [phone]      As needed    Follow-up with urology.  [phone]             Medication List        New Prescriptions      cefdinir 300 MG capsule  Commonly known as: OMNICEF  Take 1 capsule by mouth 2 (Two) Times a Day.     ondansetron ODT 4 MG disintegrating tablet  Commonly known as: ZOFRAN-ODT  Place 1 tablet on the tongue Every 8 (Eight) Hours As Needed for Nausea or Vomiting.     oxyCODONE-acetaminophen 7.5-325 MG per tablet  Commonly known as: Percocet  Take 1 tablet by mouth Every 6 (Six) Hours As Needed for Moderate Pain.     tamsulosin 0.4 MG capsule 24 hr capsule  Commonly known as: FLOMAX  Take 1 capsule by mouth 2 (Two) Times a Day.               Where to Get Your Medications        These medications were sent to [facility] - [place], [place] - [address] - [phone]  - [phone]  [address], [place] [address]      Phone: [phone]   cefdinir 300 MG capsule  ondansetron ODT 4 MG disintegrating tablet  oxyCODONE-acetaminophen 7.5-325 MG per tablet  tamsulosin 0.4 MG capsule 24 hr capsule            Leeanna Sanders,   08/08/24 2015       Leeanna Sanders,   08/08/24 6572

## 2024-08-09 NOTE — DISCHARGE INSTRUCTIONS
Hydration.  Antibiotic, nausea medication, Flomax, and pain medication recording to your pharmacy this evening as we discussed.  Please strain your urine.    Return to the emergency department if her symptoms worsen.  Please call the urology office in the morning to discuss your ER visit and kidney stone.

## 2024-08-12 ENCOUNTER — TELEPHONE (OUTPATIENT)
Dept: UROLOGY | Facility: CLINIC | Age: 44
End: 2024-08-12
Payer: COMMERCIAL

## 2024-08-12 DIAGNOSIS — N20.1 URETERAL STONE: Primary | ICD-10-CM

## 2024-08-12 RX ORDER — OXYCODONE HYDROCHLORIDE AND ACETAMINOPHEN 5; 325 MG/1; MG/1
1 TABLET ORAL EVERY 6 HOURS PRN
Qty: 12 TABLET | Refills: 0 | Status: SHIPPED | OUTPATIENT
Start: 2024-08-12 | End: 2024-08-15

## 2024-08-12 NOTE — TELEPHONE ENCOUNTER
Patient called in stating Jennifer was going to send in patient some more Pain medication but she couldn't on Friday because patient had already been giving some and it was enough to last through the weekend. I let her know I would get a message over to Jennifer.

## 2024-08-16 ENCOUNTER — TELEPHONE (OUTPATIENT)
Dept: UROLOGY | Facility: CLINIC | Age: 44
End: 2024-08-16
Payer: COMMERCIAL

## 2024-08-16 DIAGNOSIS — N20.1 URETERAL STONE: Primary | ICD-10-CM

## 2024-08-16 RX ORDER — HYDROCODONE BITARTRATE AND ACETAMINOPHEN 5; 325 MG/1; MG/1
1 TABLET ORAL EVERY 6 HOURS PRN
Qty: 12 TABLET | Refills: 0 | Status: SHIPPED | OUTPATIENT
Start: 2024-08-16

## 2024-08-16 NOTE — TELEPHONE ENCOUNTER
Patient called in stating she was still having some slight pains only at night or in the morning. When patient is home she has been able to strain her urine but when she's not at home she hasn't been able to strain it. So she's still unaware if she has past the stone. She has taken her last pain medication. She wasn't wanting to go through the weekend and the pain come back in end up back in the ER. I let her know I would get with Jennifer and get back with her.

## 2024-08-19 ENCOUNTER — PATIENT MESSAGE (OUTPATIENT)
Dept: FAMILY MEDICINE CLINIC | Facility: CLINIC | Age: 44
End: 2024-08-19
Payer: COMMERCIAL

## 2024-08-20 ENCOUNTER — HOSPITAL ENCOUNTER (EMERGENCY)
Facility: HOSPITAL | Age: 44
Discharge: HOME | End: 2024-08-20
Payer: COMMERCIAL

## 2024-08-20 ENCOUNTER — TELEPHONE (OUTPATIENT)
Dept: FAMILY MEDICINE CLINIC | Facility: CLINIC | Age: 44
End: 2024-08-20
Payer: COMMERCIAL

## 2024-08-20 ENCOUNTER — APPOINTMENT (OUTPATIENT)
Dept: CT IMAGING | Facility: HOSPITAL | Age: 44
End: 2024-08-20
Payer: COMMERCIAL

## 2024-08-20 ENCOUNTER — TELEPHONE (OUTPATIENT)
Dept: ONCOLOGY | Facility: CLINIC | Age: 44
End: 2024-08-20
Payer: COMMERCIAL

## 2024-08-20 VITALS
WEIGHT: 208 LBS | RESPIRATION RATE: 16 BRPM | HEART RATE: 67 BPM | OXYGEN SATURATION: 99 % | TEMPERATURE: 98.1 F | HEIGHT: 70 IN | SYSTOLIC BLOOD PRESSURE: 156 MMHG | DIASTOLIC BLOOD PRESSURE: 60 MMHG | BODY MASS INDEX: 29.78 KG/M2

## 2024-08-20 DIAGNOSIS — S00.03XA HEMATOMA OF SCALP, INITIAL ENCOUNTER: Primary | ICD-10-CM

## 2024-08-20 PROCEDURE — 70450 CT HEAD/BRAIN W/O DYE: CPT

## 2024-08-20 PROCEDURE — 99284 EMERGENCY DEPT VISIT MOD MDM: CPT

## 2024-08-20 NOTE — TELEPHONE ENCOUNTER
"Pt contacted clinic via telephone regarding a beqom message concerning hitting her head.  Pt states she can't access beqom right now and is not able to read the message that was sent by MELLISSA Mann.  Pt was informed the message said to \"Just watch for worsening headache, dizziness, growing \"knot\". Then would need to be seen or go to ER. \"  Pt voiced understanding.  "

## 2024-08-20 NOTE — ED PROVIDER NOTES
Subjective   History of Present Illness  Patient is a 44-year-old female who presents to the ER with a closed head injury.  She states that she is on Eliquis for history of a DVT.  She states that she was doing laundry today and bent over to  a laundry basket.  She states upon standing she hit her head rather forcefully on the laundry room cabinet.  She denies loss of consciousness or vomiting.  She did have a scalp hematoma.  She spoke with her hematologist who sent her here to the ER for CT scan for further evaluation.  Patient has a headache with no other complaints.  Past medical history significant for Graves' disease, kidney stone, SVT, DVT, anxiety        Review of Systems   Constitutional: Negative.  Negative for fever.   HENT:  Negative for congestion.    Respiratory: Negative.  Negative for cough and shortness of breath.    Cardiovascular: Negative.  Negative for chest pain.   Gastrointestinal: Negative.  Negative for abdominal pain, diarrhea, nausea and vomiting.   Genitourinary: Negative.  Negative for dysuria.   Musculoskeletal: Negative.    Skin:  Positive for wound.        Positive for scalp hematoma   Neurological:  Positive for headaches.   All other systems reviewed and are negative.      Past Medical History:   Diagnosis Date    Graves disease     Kidney stone     SVT (supraventricular tachycardia)        No Known Allergies    Past Surgical History:   Procedure Laterality Date    APPENDECTOMY      ARTERIOGRAM LOWER EXTREMITY Left 2024    Procedure: ARTERIOGRAM LOWER EXTREMITY, SUCTION THROMBECTOMY LEFT POPLITEAL ARTERY, BALLOON ANGIOPLASTY LEFT POSTERIOR TIBIAL ARTERY;  Surgeon: Trever Turcios MD;  Location: [facility] OR;  Service: Vascular;  Laterality: Left;     SECTION      ENDOSCOPY      EXTRACORPOREAL SHOCKWAVE LITHOTRIPSY (ESWL), STENT INSERTION/REMOVAL Right 3/23/2022    Procedure: EXTRACORPOREAL SHOCKWAVE LITHOTRIPSY  RIGHT CYSTO STENT PLACEMENT;  Surgeon: Scott  Michele Rosen MD;  Location:  [facility] OR;  Service: Urology;  Laterality: Right;    TUBAL ABDOMINAL LIGATION         Family History   Problem Relation Age of Onset    Breast cancer Paternal Aunt        Social History     Socioeconomic History    Marital status:    Tobacco Use    Smoking status: Some Days     Current packs/day: 1.00     Average packs/day: 1 pack/day for 13.0 years (13.0 ttl pk-yrs)     Types: Cigarettes    Smokeless tobacco: Never   Vaping Use    Vaping status: Never Used   Substance and Sexual Activity    Alcohol use: Yes     Comment: recreational    Drug use: Never    Sexual activity: Defer           Objective   Physical Exam  Vitals and nursing note reviewed.   Constitutional:       Appearance: She is well-developed.   HENT:      Head: Normocephalic.      Comments: Hematoma noted to the frontal scalp     Right Ear: External ear normal.      Left Ear: External ear normal.      Nose: Nose normal.      Mouth/Throat:      Pharynx: Oropharynx is clear.   Eyes:      Extraocular Movements: Extraocular movements intact.      Conjunctiva/sclera: Conjunctivae normal.      Pupils: Pupils are equal, round, and reactive to light.   Cardiovascular:      Rate and Rhythm: Normal rate and regular rhythm.      Heart sounds: Normal heart sounds.   Pulmonary:      Effort: Pulmonary effort is normal.      Breath sounds: Normal breath sounds.   Abdominal:      General: Bowel sounds are normal.      Palpations: Abdomen is soft.   Musculoskeletal:         General: Normal range of motion.      Cervical back: Normal range of motion and neck supple.   Skin:     General: Skin is warm and dry.      Capillary Refill: Capillary refill takes less than 2 seconds.   Neurological:      General: No focal deficit present.      Mental Status: She is alert and oriented to person, place, and time.   Psychiatric:         Mood and Affect: Mood normal.         Behavior: Behavior normal.         Thought Content: Thought content normal.          Judgment: Judgment normal.         Procedures           ED Course                                             Medical Decision Making  Patient is a 44-year-old female who presents to the ER with a closed head injury.  She states that she is on Eliquis for history of a DVT.  She states that she was doing laundry today and bent over to  a laundry basket.  She states upon standing she hit her head rather forcefully on the laundry room cabinet.  She denies loss of consciousness or vomiting.  She did have a scalp hematoma.  She spoke with her hematologist who sent her here to the ER for CT scan for further evaluation.  Patient has a headache with no other complaints.  Past medical history significant for Graves' disease, kidney stone, SVT, DVT, anxiety  Differential diagnosis: Scalp hematoma, intracranial hemorrhage, skull fracture, and other    CT Head Without Contrast   Final Result    1. No acute intracranial abnormality is seen.                   This report was signed and finalized on 8/20/2024 5:22 PM by Dr. Tuan Langley MD.       CT scan is negative for acute findings.  She will be discharged home shortly in stable condition.    Problems Addressed:  Hematoma of scalp, initial encounter: acute illness or injury    Amount and/or Complexity of Data Reviewed  Radiology: ordered. Decision-making details documented in ED Course.        Final diagnoses:   Hematoma of scalp, initial encounter       ED Disposition  ED Disposition       ED Disposition   Discharge    Condition   Good    Comment   --               No follow-up provider specified.       Medication List      No changes were made to your prescriptions during this visit.            Sheryl Russell, APRN  08/20/24 [id number]

## 2024-08-20 NOTE — TELEPHONE ENCOUNTER
Spoke with patient and advised that she be checked out to be on the safe side. She verbalized understanding and will go to the ER for evaluation.

## 2024-08-20 NOTE — TELEPHONE ENCOUNTER
----- Message from [facility] [employer] sent at 8/20/2024  2:14 PM CDT -----  Regarding: Bumped head on eliquis  Contact: [phone]  I hit my head decently hard after bending over and standing bk up on my overhead cabinet. A little over an hour ago. It hurt, no bleeding, no passing out or anything. It hurts and a little tender with a little bump on it. I’m just not sure if that’s ok or I should be checked due to being on Eliquis. Thank you.

## 2024-08-21 ENCOUNTER — OFFICE VISIT (OUTPATIENT)
Dept: UROLOGY | Facility: CLINIC | Age: 44
End: 2024-08-21
Payer: COMMERCIAL

## 2024-08-21 ENCOUNTER — TELEPHONE (OUTPATIENT)
Dept: UROLOGY | Facility: CLINIC | Age: 44
End: 2024-08-21
Payer: COMMERCIAL

## 2024-08-21 ENCOUNTER — HOSPITAL ENCOUNTER (OUTPATIENT)
Dept: CT IMAGING | Facility: HOSPITAL | Age: 44
Discharge: HOME | End: 2024-08-21
Payer: COMMERCIAL

## 2024-08-21 ENCOUNTER — HOSPITAL ENCOUNTER (OUTPATIENT)
Dept: GENERAL RADIOLOGY | Facility: HOSPITAL | Age: 44
Discharge: HOME | End: 2024-08-21
Payer: COMMERCIAL

## 2024-08-21 ENCOUNTER — HOSPITAL ENCOUNTER (OUTPATIENT)
Facility: HOSPITAL | Age: 44
Setting detail: HOSPITAL OUTPATIENT SURGERY
End: 2024-08-21
Attending: UROLOGY | Admitting: UROLOGY
Payer: COMMERCIAL

## 2024-08-21 VITALS — BODY MASS INDEX: 29.92 KG/M2 | HEIGHT: 70 IN | WEIGHT: 209 LBS | TEMPERATURE: 97.9 F

## 2024-08-21 DIAGNOSIS — N20.1 URETERAL STONE: ICD-10-CM

## 2024-08-21 DIAGNOSIS — N20.1 URETERAL STONE: Primary | ICD-10-CM

## 2024-08-21 LAB
BILIRUB BLD-MCNC: NEGATIVE MG/DL
CLARITY, POC: CLEAR
COLOR UR: ABNORMAL
GLUCOSE UR STRIP-MCNC: ABNORMAL MG/DL
KETONES UR QL: NEGATIVE
LEUKOCYTE EST, POC: NEGATIVE
NITRITE UR-MCNC: POSITIVE MG/ML
PH UR: 5 [PH] (ref 5–8)
PROT UR STRIP-MCNC: NEGATIVE MG/DL
RBC # UR STRIP: ABNORMAL /UL
SP GR UR: 1.01 (ref 1–1.03)
UROBILINOGEN UR QL: NORMAL

## 2024-08-21 PROCEDURE — 74176 CT ABD & PELVIS W/O CONTRAST: CPT

## 2024-08-21 PROCEDURE — 74018 RADEX ABDOMEN 1 VIEW: CPT

## 2024-08-21 PROCEDURE — 87086 URINE CULTURE/COLONY COUNT: CPT | Performed by: PHYSICIAN ASSISTANT

## 2024-08-21 RX ORDER — CEFDINIR 300 MG/1
300 CAPSULE ORAL 2 TIMES DAILY
Qty: 20 CAPSULE | Refills: 0 | Status: SHIPPED | OUTPATIENT
Start: 2024-08-21 | End: 2024-08-31

## 2024-08-21 RX ORDER — HYDROCODONE BITARTRATE AND ACETAMINOPHEN 5; 325 MG/1; MG/1
1 TABLET ORAL EVERY 6 HOURS PRN
Qty: 12 TABLET | Refills: 0 | Status: SHIPPED | OUTPATIENT
Start: 2024-08-21

## 2024-08-21 NOTE — TELEPHONE ENCOUNTER
Called patient to remind them to arrive at patient registration on 8-23-24 at 500am for the procedure with Dr. Chavez. Spoke with patient. Told patient if they had any questions to please contact our office at [phone].

## 2024-08-21 NOTE — PROGRESS NOTES
Subjective    Ms. Ahn is 44 y.o. female    Chief Complaint: poss stone/poss UTI    History of Present Illness  Patient is a 44-year-old female who presents for follow-up with history of left distal ureteral stone.  She had seen Jennifer Blanca 08/09/2024 which was at [facility] ER follow-up due to flank pain along with urinary urgency.  CT scan she was found to have 4 mm left UVJ stone.  She had a previous right ESWL with stent placement 03/20/2022.  She has not passed the stone she is aware of she had a few days with no pain but she has had recurrence of discomfort in the left lower quadrant along with urgency and frequency.  She denies any fever or chills.    The following portions of the patient's history were reviewed and updated as appropriate: allergies, current medications, past family history, past medical history, past social history, past surgical history and problem list.    Review of Systems   Constitutional:  Negative for chills and fever.   Gastrointestinal:  Positive for abdominal pain.   Genitourinary:  Positive for frequency and urgency.         Current Outpatient Medications:     apixaban (ELIQUIS) 5 MG tablet tablet, Take 1 tablet by mouth Every 12 (Twelve) Hours. Indications: Other - full anticoagulation, Disp: 60 tablet, Rfl: 2    atenolol (TENORMIN) 25 MG tablet, Take 1 tablet by mouth 2 (Two) Times a Day., Disp: 180 tablet, Rfl: 0    fenofibrate (TRICOR) 145 MG tablet, Take 1 tablet by mouth Daily., Disp: 30 tablet, Rfl: 2    Ferric Maltol (ACCRUFeR) 30 MG capsule, Take 1 capsule by mouth 2 (Two) Times a Day., Disp: 60 capsule, Rfl: 2    ferrous gluconate (FERGON) 324 MG tablet, Take 1 tablet by mouth 2 (Two) Times a Day., Disp: 60 tablet, Rfl: 2    HYDROcodone-acetaminophen (Norco) 5-325 MG per tablet, Take 1 tablet by mouth Every 6 (Six) Hours As Needed for Moderate Pain., Disp: 12 tablet, Rfl: 0    LORazepam (Ativan) 0.5 MG tablet, 1/2 am and 1 HS as needed for anxiety, Disp: 20 tablet, Rfl:  0    multivitamin with minerals tablet tablet, Take 1 tablet by mouth Daily., Disp: , Rfl:     nicotine (NICODERM CQ) 7 MG/24HR patch, Place 1 patch on the skin as directed by provider Daily., Disp: 30 patch, Rfl: 0    ondansetron ODT (ZOFRAN-ODT) 4 MG disintegrating tablet, Place 1 tablet on the tongue Every 8 (Eight) Hours As Needed for Nausea or Vomiting., Disp: 21 tablet, Rfl: 0    tamsulosin (FLOMAX) 0.4 MG capsule 24 hr capsule, Take 1 capsule by mouth 2 (Two) Times a Day., Disp: 60 capsule, Rfl: 0    cefdinir (OMNICEF) 300 MG capsule, Take 1 capsule by mouth 2 (Two) Times a Day for 10 days., Disp: 20 capsule, Rfl: 0    Past Medical History:   Diagnosis Date    Graves disease     Kidney stone     SVT (supraventricular tachycardia)        Past Surgical History:   Procedure Laterality Date    APPENDECTOMY      ARTERIOGRAM LOWER EXTREMITY Left 2024    Procedure: ARTERIOGRAM LOWER EXTREMITY, SUCTION THROMBECTOMY LEFT POPLITEAL ARTERY, BALLOON ANGIOPLASTY LEFT POSTERIOR TIBIAL ARTERY;  Surgeon: Trever Turcios MD;  Location:  [facility] OR;  Service: Vascular;  Laterality: Left;     SECTION      ENDOSCOPY      EXTRACORPOREAL SHOCKWAVE LITHOTRIPSY (ESWL), STENT INSERTION/REMOVAL Right 3/23/2022    Procedure: EXTRACORPOREAL SHOCKWAVE LITHOTRIPSY  RIGHT CYSTO STENT PLACEMENT;  Surgeon: Micheel Chavez MD;  Location:  [facility] OR;  Service: Urology;  Laterality: Right;    TUBAL ABDOMINAL LIGATION         Social History     Socioeconomic History    Marital status:    Tobacco Use    Smoking status: Some Days     Current packs/day: 1.00     Average packs/day: 1 pack/day for 13.0 years (13.0 ttl pk-yrs)     Types: Cigarettes    Smokeless tobacco: Never   Vaping Use    Vaping status: Never Used   Substance and Sexual Activity    Alcohol use: Yes     Comment: recreational    Drug use: Never    Sexual activity: Defer       Family History   Problem Relation Age of Onset    Breast cancer Paternal Aunt   "      Objective    Temp 97.9 °F (36.6 °C) (Temporal)   Ht 177.8 cm (70\")   Wt 94.8 kg (209 lb)   LMP 08/02/2024 (Approximate)   BMI 29.99 kg/m²     Physical Exam  Constitutional:       Appearance: Normal appearance.   HENT:      Head: Normocephalic and atraumatic.   Pulmonary:      Effort: Pulmonary effort is normal.   Skin:     Coloration: Skin is not pale.   Neurological:      Mental Status: She is alert.   Psychiatric:         Mood and Affect: Mood normal.         Behavior: Behavior normal.             Results for orders placed or performed in visit on 08/21/24   POC Urinalysis Dipstick, Multipro    Specimen: Urine   Result Value Ref Range    Color Orange (A) Yellow, Straw, Dark Yellow, [place]    Clarity, UA Clear Clear    Glucose,  mg/dL (A) Negative mg/dL    Bilirubin Negative Negative    Ketones, UA Negative Negative    Specific Gravity  1.015 1.005 - 1.030    Blood, UA Large (A) Negative    pH, Urine 5.0 5.0 - 8.0    Protein, POC Negative Negative mg/dL    Urobilinogen, UA Normal Normal, 0.2 E.U./dL    Nitrite, UA Positive (A) Negative    Leukocytes Negative Negative     KUB independent review    A KUB is available for me to review today.  The image is inspected for a bowel gas pattern and the general bone structure of the spine and pelvis. The kidneys are then inspected closely.  Renal outline is noted if identifiable. The kidney, collecting system, and anticipated path of the ureter are examined for calcifications including those in the true pelvis.  This film reveals:    On the right there are no calcificaitons seen in the kidney or the expected course of the ureter. .    On the left there is a single distal ureteral stone measuring 5 mm.  Assessment and Plan    Diagnoses and all orders for this visit:    1. Ureteral stone (Primary)  -     POC Urinalysis Dipstick, Multipro  -     Urine Culture - Urine, Urine, Random Void  -     CT Abdomen Pelvis Without Contrast; Future  -     cefdinir (OMNICEF) " 300 MG capsule; Take 1 capsule by mouth 2 (Two) Times a Day for 10 days.  Dispense: 20 capsule; Refill: 0  -     HYDROcodone-acetaminophen (Norco) 5-325 MG per tablet; Take 1 tablet by mouth Every 6 (Six) Hours As Needed for Moderate Pain.  Dispense: 12 tablet; Refill: 0    There is a calcification in the left hemipelvis most likely is the stone in the distal ureter.  Before committing her for surgery I would like to get a CT renal stone protocol stat today we will most likely review tomorrow.  I already had discussion with Dr. Chavez who will send in pain medication and he will treat here this coming Friday with ureteroscopy if stone is still present.  Her symptoms are indicative of the stone still being in the ureter.  Will also treat her with antibiotic to prevent infection.  She has been taking Azo or Pyridium so therefore her nitrite was most likely false positive.    After discussion with Dr. Chavez I went ahead and put in case request for left ureteroscopy laser lithotripsy placement of left double-J stent this coming Friday with Dr. Chavez.

## 2024-08-22 ENCOUNTER — TELEPHONE (OUTPATIENT)
Dept: UROLOGY | Facility: CLINIC | Age: 44
End: 2024-08-22
Payer: COMMERCIAL

## 2024-08-22 NOTE — SIGNIFICANT NOTE
Home Rx;  Tenormin (atenolol); instructed to take, w/ sip of water the DOS-8/23/24.  [place] # 5 (hydrocodone); instructed May take, w/ sip of water the DOS-8/23/24.  Due to pt's recent arterial occlusion in leg, per physician's instructions,  cannot stop Eliquis; will not take before [place] # 5 (hydrocodone); instructed May take, w/ sip of water the DOS-8/23/24.

## 2024-08-22 NOTE — TELEPHONE ENCOUNTER
----- Message from Shanti STEPHENSON sent at 8/22/2024 12:24 PM CDT -----  Regarding: FW: Urine culture  Can you please call this pt with her results from [facility]  ----- Message -----  From: [facility]oy Gimenez PA  Sent: 8/22/2024  11:43 AM CDT  To: Shanti Doe CMA  Subject: Urine culture                                    Culture negative  ----- Message -----  From: Kay Stein CMA  Sent: 8/21/2024   2:38 PM CDT  To: SHASHI Jones

## 2024-08-22 NOTE — TELEPHONE ENCOUNTER
Placed a call to pt to inform her there was no bacteria growth with her urine culture. Pt states she has not started her antibiotic therapy due to waiting for results. Educated pt that antibiotics are not needed at this time due to no growth. She voiced understanding. She denied any other questions or concerns.

## 2024-08-23 ENCOUNTER — TELEPHONE (OUTPATIENT)
Dept: UROLOGY | Facility: CLINIC | Age: 44
End: 2024-08-23
Payer: COMMERCIAL

## 2024-08-23 DIAGNOSIS — N13.30 HYDRONEPHROSIS, LEFT: ICD-10-CM

## 2024-08-23 DIAGNOSIS — N20.1 URETERAL STONE: Primary | ICD-10-CM

## 2024-08-23 DIAGNOSIS — N13.4 HYDROURETER: ICD-10-CM

## 2024-08-23 LAB — BACTERIA SPEC AEROBE CULT: NO GROWTH

## 2024-08-23 PROCEDURE — 88300 SURGICAL PATH GROSS: CPT | Performed by: PHYSICIAN ASSISTANT

## 2024-08-23 PROCEDURE — 82360 CALCULUS ASSAY QUANT: CPT | Performed by: PHYSICIAN ASSISTANT

## 2024-08-23 NOTE — TELEPHONE ENCOUNTER
Called pt. To let her know I discussed her CT with [facility] and let her know that they hydronephrosis and hydroureter should resolve now that she has passed her stone. Per [facility]oy's recommendation. Pt. Should f/u in one month with a renal US prior. Pt. V/u, transferred to  to change appt. Order entered for renal US.

## 2024-08-23 NOTE — TELEPHONE ENCOUNTER
Patient came by and dropped off kidney stone that she had recently passed.  Stone was present in a plastic bag which I gave to clinical to test.  Patient had questions and concerns regarding her most recent CT, and was wanting to know if the passing of this recent stone would clear up the issues shown on her CT.  I told her I would pass the questions to clinical and have someone get in contact with her to discuss if needed.

## 2024-08-29 DIAGNOSIS — I70.209 ARTERIAL OCCLUSION, LOWER EXTREMITY: ICD-10-CM

## 2024-08-29 RX ORDER — APIXABAN 5 MG/1
5 TABLET, FILM COATED ORAL EVERY 12 HOURS
Qty: 60 TABLET | Refills: 0 | Status: SHIPPED | OUTPATIENT
Start: 2024-08-29

## 2024-09-05 ENCOUNTER — OFFICE VISIT (OUTPATIENT)
Dept: CARDIOLOGY | Facility: CLINIC | Age: 44
End: 2024-09-05
Payer: COMMERCIAL

## 2024-09-05 VITALS
WEIGHT: 201 LBS | DIASTOLIC BLOOD PRESSURE: 71 MMHG | OXYGEN SATURATION: 97 % | HEART RATE: 63 BPM | SYSTOLIC BLOOD PRESSURE: 113 MMHG | BODY MASS INDEX: 28.77 KG/M2 | HEIGHT: 70 IN

## 2024-09-05 DIAGNOSIS — R00.2 PALPITATIONS: Primary | ICD-10-CM

## 2024-09-05 DIAGNOSIS — Z72.0 TOBACCO USE: ICD-10-CM

## 2024-09-05 DIAGNOSIS — I70.209 ARTERIAL OCCLUSION, LOWER EXTREMITY: ICD-10-CM

## 2024-09-05 DIAGNOSIS — I47.10 SVT (SUPRAVENTRICULAR TACHYCARDIA): ICD-10-CM

## 2024-09-05 PROCEDURE — 99214 OFFICE O/P EST MOD 30 MIN: CPT | Performed by: INTERNAL MEDICINE

## 2024-09-05 NOTE — PROGRESS NOTES
"Chief Complaint  SVT (supraventricular tachycardia) (2 month follow up)    Subjective      Denise Ahn presents to [facility] CARDIOLOG[facility]  History of Present Illness  Denise is doing fairly well.  She continues to have occasional palpitations.  She remains anxious.  All of her symptoms including palpitations were much improved when she was taking lorazepam but this was discontinued as it was given for anxiety following her arterial embolus.  She tells me that she has tried numerous alternatives in the past and has been intolerant of most of them.  She has had no anginal type chest discomfort or unusual dyspnea.  There has been no syncope or presyncope.    She did not undergo her stress echocardiogram due to financial concerns.  Apparently there was a considerable co-pay.  She continues to take Eliquis.    Objective   Vital Signs:  /71 (BP Location: Left arm, Patient Position: Sitting, Cuff Size: Adult)   Pulse 63   Ht 177.8 cm (70\")   Wt 91.2 kg (201 lb)   SpO2 97%   BMI 28.84 kg/m²   Estimated body mass index is 28.84 kg/m² as calculated from the following:    Height as of this encounter: 177.8 cm (70\").    Weight as of this encounter: 91.2 kg (201 lb).            Physical Exam  A 44-year-old female who is awake, alert and oriented x 3.  She is in no distress.  HEENT: No scleral icterus.  Normocephalic.  Neck: No bruits or jugular venous distention.  Lungs: Clear to auscultation.  Heart: Regular rhythm with normal S1 and S2 and no audible murmurs or gallops.  Extremities: No pretibial edema or cyanosis.  Neurologic: No focal abnormalities were noted.    Result Review :                   Assessment and Plan   Diagnoses and all orders for this visit:    1. Palpitations (Primary)    2. SVT (supraventricular tachycardia)    3. Arterial occlusion, lower extremity    4. Tobacco use    1.  Palpitations.  These continue on occasion and are exacerbated by her chronic anxiety.  The atenolol " helps somewhat.  We will continue this medication at the current dose.  See #2 below.    2.  Paroxysmal supraventricular tachycardia.  She still has occasional episodes and these appear to be somewhat associated with anxiety.  Nevertheless, they have improved with atenolol.  As noted on previous note.  At least some of the supraventricular tachycardia is felt to represent paroxysmal atrial fibrillation.  She is on full antithrombotic therapy though her [id number] score is only 1.  (See #3 below).    3.  Lower extremity arterial occlusion.  I suspect she had cardioembolic phenomenon from atrial fibrillation despite her low [id number] score.  Continue Eliquis.    4.  Ongoing tobacco use.  She was counseled once again regarding all of the health risks of ongoing tobacco use including all types of vascular disease and various types of cancers as well as COPD, all of which could potentially result in death.  She has cut back on her smoking considerably and is planning to quit at some point.  Unfortunately, she resumed smoking after having a recent kidney stone but only smoked 8 cigarettes in a month's period of time.    All questions were answered to the best of my ability.  She is encouraged to contact us for any further concerns.  A return visit is scheduled in 6 months.      There are no Patient Instructions on file for this visit.       Follow Up   Return in about 6 months (around 3/5/2025) for Next scheduled follow up.  Patient was given instructions and counseling regarding her condition or for health maintenance advice. Please see specific information pulled into the AVS if appropriate.

## 2024-09-06 ENCOUNTER — OFFICE VISIT (OUTPATIENT)
Dept: FAMILY MEDICINE CLINIC | Facility: CLINIC | Age: 44
End: 2024-09-06
Payer: COMMERCIAL

## 2024-09-06 VITALS
DIASTOLIC BLOOD PRESSURE: 75 MMHG | BODY MASS INDEX: 29.13 KG/M2 | WEIGHT: 203.5 LBS | OXYGEN SATURATION: 97 % | HEART RATE: 63 BPM | SYSTOLIC BLOOD PRESSURE: 109 MMHG | HEIGHT: 70 IN

## 2024-09-06 DIAGNOSIS — Z86.79 HISTORY OF ARTERIAL OCCLUSION: ICD-10-CM

## 2024-09-06 DIAGNOSIS — F41.8 OTHER SPECIFIED ANXIETY DISORDERS: Primary | ICD-10-CM

## 2024-09-06 DIAGNOSIS — Z13.220 SCREENING CHOLESTEROL LEVEL: ICD-10-CM

## 2024-09-06 DIAGNOSIS — I47.10 SVT (SUPRAVENTRICULAR TACHYCARDIA): ICD-10-CM

## 2024-09-06 DIAGNOSIS — D50.8 OTHER IRON DEFICIENCY ANEMIA: ICD-10-CM

## 2024-09-06 DIAGNOSIS — D25.9 UTERINE LEIOMYOMA, UNSPECIFIED LOCATION: ICD-10-CM

## 2024-09-06 PROCEDURE — 99214 OFFICE O/P EST MOD 30 MIN: CPT | Performed by: NURSE PRACTITIONER

## 2024-09-06 RX ORDER — ATENOLOL 25 MG/1
25 TABLET ORAL 2 TIMES DAILY
Qty: 180 TABLET | Refills: 1 | Status: SHIPPED | OUTPATIENT
Start: 2024-09-06

## 2024-09-06 RX ORDER — FERRIC MALTOL 30 MG/1
1 CAPSULE ORAL 2 TIMES DAILY
Qty: 60 CAPSULE | Refills: 2 | Status: SHIPPED | OUTPATIENT
Start: 2024-09-06

## 2024-09-06 NOTE — PROGRESS NOTES
"Chief Complaint  Anxiety    Subjective    History of Present Illness      Patient presents to [facility] for   History of Present Illness  Pt presents today for 3 month follow up on anxiety. Says she still has anxiety but feels she is \"high functioning anxiety\". Wanting to have her iron and triglyceride level checked. Says she needs an extra few doses of her anticoag. Has seen urology and cardiology. Will be seeing her gyn soon about her bleeding/fibroid.        Review of Systems    I have reviewed and agree with the HPI and ROS information as above.  Lela Boyle, APRSILVINA     Objective   Vital Signs:   /75   Pulse 63   Ht 177.8 cm (70\")   Wt 92.3 kg (203 lb 8 oz)   SpO2 97%   BMI 29.20 kg/m²            Physical Exam  Constitutional:       Appearance: She is well-developed and overweight.   HENT:      Head: Normocephalic and atraumatic.      Right Ear: Tympanic membrane, ear canal and external ear normal.      Left Ear: Tympanic membrane, ear canal and external ear normal.      Nose: Nose normal. No septal deviation, nasal tenderness or congestion.      Mouth/Throat:      Lips: Pink. No lesions.      Mouth: Mucous membranes are moist. No oral lesions.      Dentition: Normal dentition.      Pharynx: Oropharynx is clear. No pharyngeal swelling, oropharyngeal exudate or posterior oropharyngeal erythema.   Eyes:      General: Lids are normal. Vision grossly intact. No scleral icterus.        Right eye: No discharge.         Left eye: No discharge.      Extraocular Movements: Extraocular movements intact.      Conjunctiva/sclera: Conjunctivae normal.      Right eye: Right conjunctiva is not injected.      Left eye: Left conjunctiva is not injected.      Pupils: Pupils are equal, round, and reactive to light.   Neck:      Thyroid: No thyroid mass.      Trachea: Trachea normal.   Cardiovascular:      Rate and Rhythm: Normal rate and regular rhythm.      Heart sounds: Normal heart " sounds. No murmur heard.     No gallop.   Pulmonary:      Effort: Pulmonary effort is normal.      Breath sounds: Normal breath sounds and air entry. No wheezing, rhonchi or rales.   Abdominal:      General: There is no distension.      Palpations: Abdomen is soft. There is no mass.      Tenderness: There is no abdominal tenderness. There is no right CVA tenderness, left CVA tenderness, guarding or rebound.   Musculoskeletal:         General: No tenderness or deformity. Normal range of motion.      Cervical back: Full passive range of motion without pain, normal range of motion and neck supple.      Thoracic back: Normal.      Right lower leg: No edema.      Left lower leg: No edema.   Skin:     General: Skin is warm and dry.      Coloration: Skin is not jaundiced.      Findings: No rash.   Neurological:      Mental Status: She is alert and oriented to person, place, and time.      Sensory: Sensation is intact.      Motor: Motor function is intact.      Coordination: Coordination is intact.      Gait: Gait is intact.      Deep Tendon Reflexes: Reflexes are normal and symmetric.   Psychiatric:         Mood and Affect: Mood and affect normal.         Judgment: Judgment normal.               Result Review  Data Reviewed:          Office Visit with Swapnil Serrano MD (09/05/2024)          Assessment and Plan      Diagnoses and all orders for this visit:    1. Other specified anxiety disorders (Primary)  Comments:  Discussed potential of starting Pristiq. She just is not ready yet. T& F: prozac, celexa, wellbutrin, buspar, vistaril, seroquel in the past.    2. SVT (supraventricular tachycardia)  Comments:  Thought to be PAF. We will cont anticoagulation and following with cardiology and atenolol.  Orders:  -     atenolol (TENORMIN) 25 MG tablet; Take 1 tablet by mouth 2 (Two) Times a Day.  Dispense: 180 tablet; Refill: 1    3. History of arterial occlusion  Comments:  Cont anticoag indefinately.  Orders:  -      apixaban (Eliquis) 5 MG tablet tablet; Take 1 tablet by mouth Every 12 (Twelve) Hours.  Dispense: 183 tablet; Refill: 1    4. Other iron deficiency anemia  Comments:  Recheck levels. Had iron infusion.  Orders:  -     Iron and TIBC; Future  -     CBC w AUTO Differential; Future  -     Ferric Maltol (ACCRUFeR) 30 MG capsule; Take 1 capsule by mouth 2 (Two) Times a Day.  Dispense: 60 capsule; Refill: 2    5. Screening cholesterol level  -     Lipid panel; Future    6. Uterine leiomyoma, unspecified location  Comments:  Will be seeing her GYN again soon.            Follow Up   Return in about 3 months (around 12/6/2024).  Patient was given instructions and counseling regarding her condition or for health maintenance advice. Please see specific information pulled into the AVS if appropriate.

## 2024-09-11 ENCOUNTER — LAB (OUTPATIENT)
Dept: LAB | Facility: HOSPITAL | Age: 44
End: 2024-09-11
Payer: COMMERCIAL

## 2024-09-11 DIAGNOSIS — Z13.220 SCREENING CHOLESTEROL LEVEL: ICD-10-CM

## 2024-09-11 DIAGNOSIS — D50.8 OTHER IRON DEFICIENCY ANEMIA: ICD-10-CM

## 2024-09-11 LAB
AUTO MIXED CELLS #: 0.5 10*3/MM3 (ref 0.1–2.6)
AUTO MIXED CELLS %: 7 % (ref 0.1–24)
CHOLEST SERPL-MCNC: 181 MG/DL (ref 130–200)
ERYTHROCYTE [DISTWIDTH] IN BLOOD BY AUTOMATED COUNT: 16.9 % (ref 12.3–15.4)
HCT VFR BLD AUTO: 37.8 % (ref 34–46.6)
HDLC SERPL-MCNC: 41 MG/DL
HGB BLD-MCNC: 11.7 G/DL (ref 12–15.9)
IRON 24H UR-MRATE: 21 MCG/DL (ref 37–145)
IRON SATN MFR SERPL: 4 % (ref 20–50)
LDLC SERPL CALC-MCNC: 93 MG/DL (ref 0–99)
LDLC/HDLC SERPL: 2.04 {RATIO}
LYMPHOCYTES # BLD AUTO: 1.9 10*3/MM3 (ref 0.7–3.1)
LYMPHOCYTES NFR BLD AUTO: 25.4 % (ref 19.6–45.3)
MCH RBC QN AUTO: 26.3 PG (ref 26.6–33)
MCHC RBC AUTO-ENTMCNC: 31 G/DL (ref 31.5–35.7)
MCV RBC AUTO: 84.9 FL (ref 79–97)
NEUTROPHILS NFR BLD AUTO: 5.1 10*3/MM3 (ref 1.7–7)
NEUTROPHILS NFR BLD AUTO: 67.6 % (ref 42.7–76)
PLATELET # BLD AUTO: 282 10*3/MM3 (ref 140–450)
PMV BLD AUTO: 8.7 FL (ref 6–12)
RBC # BLD AUTO: 4.45 10*6/MM3 (ref 3.77–5.28)
TIBC SERPL-MCNC: 513 MCG/DL (ref 298–536)
TRANSFERRIN SERPL-MCNC: 344 MG/DL (ref 200–360)
TRIGL SERPL-MCNC: 281 MG/DL (ref 0–149)
VLDLC SERPL-MCNC: 47 MG/DL (ref 5–40)
WBC NRBC COR # BLD AUTO: 7.5 10*3/MM3 (ref 3.4–10.8)

## 2024-09-11 PROCEDURE — 80061 LIPID PANEL: CPT

## 2024-09-11 PROCEDURE — 84466 ASSAY OF TRANSFERRIN: CPT

## 2024-09-11 PROCEDURE — 85025 COMPLETE CBC W/AUTO DIFF WBC: CPT

## 2024-09-11 PROCEDURE — 83540 ASSAY OF IRON: CPT

## 2024-09-11 PROCEDURE — 36415 COLL VENOUS BLD VENIPUNCTURE: CPT

## 2024-09-16 ENCOUNTER — TELEPHONE (OUTPATIENT)
Dept: FAMILY MEDICINE CLINIC | Facility: CLINIC | Age: 44
End: 2024-09-16
Payer: COMMERCIAL

## 2024-09-16 NOTE — PROGRESS NOTES
Please let pt know results: cbc ok, hemoglobin has come up slightly from 1 mo ago, lipid panel is ok-trigs still slightly high but much better than 1 year ago of 705 down to 281.  iron profile does show slightly low iron-but better than 3 months ago and H and H improving.

## 2024-09-17 ENCOUNTER — HOSPITAL ENCOUNTER (OUTPATIENT)
Dept: ULTRASOUND IMAGING | Facility: HOSPITAL | Age: 44
Discharge: HOME | End: 2024-09-17
Admitting: PHYSICIAN ASSISTANT
Payer: COMMERCIAL

## 2024-09-17 DIAGNOSIS — N13.4 HYDROURETER: ICD-10-CM

## 2024-09-17 DIAGNOSIS — N20.1 URETERAL STONE: ICD-10-CM

## 2024-09-17 DIAGNOSIS — N13.30 HYDRONEPHROSIS, LEFT: ICD-10-CM

## 2024-09-17 PROCEDURE — 76775 US EXAM ABDO BACK WALL LIM: CPT

## 2024-09-21 ENCOUNTER — APPOINTMENT (OUTPATIENT)
Dept: ULTRASOUND IMAGING | Facility: HOSPITAL | Age: 44
End: 2024-09-21
Payer: COMMERCIAL

## 2024-09-21 ENCOUNTER — HOSPITAL ENCOUNTER (EMERGENCY)
Facility: HOSPITAL | Age: 44
Discharge: HOME | End: 2024-09-22
Admitting: EMERGENCY MEDICINE
Payer: COMMERCIAL

## 2024-09-21 DIAGNOSIS — N92.0 MENORRHAGIA WITH REGULAR CYCLE: ICD-10-CM

## 2024-09-21 DIAGNOSIS — D25.9 UTERINE LEIOMYOMA, UNSPECIFIED LOCATION: Primary | ICD-10-CM

## 2024-09-21 LAB
ABO GROUP BLD: NORMAL
ALBUMIN SERPL-MCNC: 4.3 G/DL (ref 3.5–5.2)
ALBUMIN/GLOB SERPL: 1.8 G/DL
ALP SERPL-CCNC: 62 U/L (ref 39–117)
ALT SERPL W P-5'-P-CCNC: 18 U/L (ref 1–33)
ANION GAP SERPL CALCULATED.3IONS-SCNC: 11 MMOL/L (ref 5–15)
APTT PPP: 28.9 SECONDS (ref 24.5–36)
AST SERPL-CCNC: 12 U/L (ref 1–32)
BASOPHILS # BLD AUTO: 0.04 10*3/MM3 (ref 0–0.2)
BASOPHILS NFR BLD AUTO: 0.4 % (ref 0–1.5)
BILIRUB SERPL-MCNC: 0.2 MG/DL (ref 0–1.2)
BLD GP AB SCN SERPL QL: NEGATIVE
BUN SERPL-MCNC: 7 MG/DL (ref 6–20)
BUN/CREAT SERPL: 10.1 (ref 7–25)
CALCIUM SPEC-SCNC: 8.8 MG/DL (ref 8.6–10.5)
CHLORIDE SERPL-SCNC: 106 MMOL/L (ref 98–107)
CO2 SERPL-SCNC: 25 MMOL/L (ref 22–29)
CREAT SERPL-MCNC: 0.69 MG/DL (ref 0.57–1)
DEPRECATED RDW RBC AUTO: 54.5 FL (ref 37–54)
EGFRCR SERPLBLD CKD-EPI 2021: 111.3 ML/MIN/1.73
EOSINOPHIL # BLD AUTO: 0.1 10*3/MM3 (ref 0–0.4)
EOSINOPHIL NFR BLD AUTO: 1.1 % (ref 0.3–6.2)
ERYTHROCYTE [DISTWIDTH] IN BLOOD BY AUTOMATED COUNT: 18.1 % (ref 12.3–15.4)
GLOBULIN UR ELPH-MCNC: 2.4 GM/DL
GLUCOSE SERPL-MCNC: 117 MG/DL (ref 65–99)
HCG SERPL QL: NEGATIVE
HCT VFR BLD AUTO: 32.8 % (ref 34–46.6)
HGB BLD-MCNC: 10.5 G/DL (ref 12–15.9)
IMM GRANULOCYTES # BLD AUTO: 0.06 10*3/MM3 (ref 0–0.05)
IMM GRANULOCYTES NFR BLD AUTO: 0.7 % (ref 0–0.5)
INR PPP: 1.18 (ref 0.91–1.09)
LIPASE SERPL-CCNC: 47 U/L (ref 13–60)
LYMPHOCYTES # BLD AUTO: 1.72 10*3/MM3 (ref 0.7–3.1)
LYMPHOCYTES NFR BLD AUTO: 18.9 % (ref 19.6–45.3)
MCH RBC QN AUTO: 27 PG (ref 26.6–33)
MCHC RBC AUTO-ENTMCNC: 32 G/DL (ref 31.5–35.7)
MCV RBC AUTO: 84.3 FL (ref 79–97)
MONOCYTES # BLD AUTO: 0.56 10*3/MM3 (ref 0.1–0.9)
MONOCYTES NFR BLD AUTO: 6.1 % (ref 5–12)
NEUTROPHILS NFR BLD AUTO: 6.63 10*3/MM3 (ref 1.7–7)
NEUTROPHILS NFR BLD AUTO: 72.8 % (ref 42.7–76)
NRBC BLD AUTO-RTO: 0 /100 WBC (ref 0–0.2)
PLATELET # BLD AUTO: 219 10*3/MM3 (ref 140–450)
PMV BLD AUTO: 9.8 FL (ref 6–12)
POTASSIUM SERPL-SCNC: 3.8 MMOL/L (ref 3.5–5.2)
PROT SERPL-MCNC: 6.7 G/DL (ref 6–8.5)
PROTHROMBIN TIME: 15.5 SECONDS (ref 11.8–14.8)
RBC # BLD AUTO: 3.89 10*6/MM3 (ref 3.77–5.28)
RH BLD: POSITIVE
SODIUM SERPL-SCNC: 142 MMOL/L (ref 136–145)
T&S EXPIRATION DATE: NORMAL
WBC NRBC COR # BLD AUTO: 9.11 10*3/MM3 (ref 3.4–10.8)

## 2024-09-21 PROCEDURE — 25010000002 HYDROMORPHONE PER 4 MG: Performed by: NURSE PRACTITIONER

## 2024-09-21 PROCEDURE — 99284 EMERGENCY DEPT VISIT MOD MDM: CPT

## 2024-09-21 PROCEDURE — 76830 TRANSVAGINAL US NON-OB: CPT

## 2024-09-21 PROCEDURE — 93975 VASCULAR STUDY: CPT

## 2024-09-21 PROCEDURE — 85730 THROMBOPLASTIN TIME PARTIAL: CPT | Performed by: NURSE PRACTITIONER

## 2024-09-21 PROCEDURE — 86901 BLOOD TYPING SEROLOGIC RH(D): CPT | Performed by: NURSE PRACTITIONER

## 2024-09-21 PROCEDURE — 96374 THER/PROPH/DIAG INJ IV PUSH: CPT

## 2024-09-21 PROCEDURE — 96375 TX/PRO/DX INJ NEW DRUG ADDON: CPT

## 2024-09-21 PROCEDURE — 25810000003 SODIUM CHLORIDE 0.9 % SOLUTION: Performed by: NURSE PRACTITIONER

## 2024-09-21 PROCEDURE — 85610 PROTHROMBIN TIME: CPT | Performed by: NURSE PRACTITIONER

## 2024-09-21 PROCEDURE — 83690 ASSAY OF LIPASE: CPT | Performed by: NURSE PRACTITIONER

## 2024-09-21 PROCEDURE — 84703 CHORIONIC GONADOTROPIN ASSAY: CPT | Performed by: NURSE PRACTITIONER

## 2024-09-21 PROCEDURE — 25010000002 ONDANSETRON PER 1 MG: Performed by: NURSE PRACTITIONER

## 2024-09-21 PROCEDURE — 86850 RBC ANTIBODY SCREEN: CPT | Performed by: NURSE PRACTITIONER

## 2024-09-21 PROCEDURE — 86900 BLOOD TYPING SEROLOGIC ABO: CPT | Performed by: NURSE PRACTITIONER

## 2024-09-21 PROCEDURE — 85025 COMPLETE CBC W/AUTO DIFF WBC: CPT | Performed by: NURSE PRACTITIONER

## 2024-09-21 PROCEDURE — 80053 COMPREHEN METABOLIC PANEL: CPT | Performed by: NURSE PRACTITIONER

## 2024-09-21 RX ORDER — HYDROCODONE BITARTRATE AND ACETAMINOPHEN 7.5; 325 MG/1; MG/1
1 TABLET ORAL ONCE
Status: COMPLETED | OUTPATIENT
Start: 2024-09-22 | End: 2024-09-22

## 2024-09-21 RX ORDER — ONDANSETRON 2 MG/ML
4 INJECTION INTRAMUSCULAR; INTRAVENOUS ONCE
Status: COMPLETED | OUTPATIENT
Start: 2024-09-21 | End: 2024-09-21

## 2024-09-21 RX ORDER — SODIUM CHLORIDE 0.9 % (FLUSH) 0.9 %
10 SYRINGE (ML) INJECTION AS NEEDED
Status: DISCONTINUED | OUTPATIENT
Start: 2024-09-21 | End: 2024-09-22 | Stop reason: HOSPADM

## 2024-09-21 RX ORDER — ONDANSETRON 4 MG/1
4 TABLET, ORALLY DISINTEGRATING ORAL ONCE
Status: COMPLETED | OUTPATIENT
Start: 2024-09-22 | End: 2024-09-22

## 2024-09-21 RX ORDER — HYDROMORPHONE HYDROCHLORIDE 1 MG/ML
0.5 INJECTION, SOLUTION INTRAMUSCULAR; INTRAVENOUS; SUBCUTANEOUS ONCE
Status: COMPLETED | OUTPATIENT
Start: 2024-09-21 | End: 2024-09-21

## 2024-09-21 RX ADMIN — SODIUM CHLORIDE 500 ML: 9 INJECTION, SOLUTION INTRAVENOUS at 19:26

## 2024-09-21 RX ADMIN — ONDANSETRON 4 MG: 2 INJECTION INTRAMUSCULAR; INTRAVENOUS at 20:38

## 2024-09-21 RX ADMIN — HYDROMORPHONE HYDROCHLORIDE 0.5 MG: 1 INJECTION, SOLUTION INTRAMUSCULAR; INTRAVENOUS; SUBCUTANEOUS at 20:41

## 2024-09-22 VITALS
DIASTOLIC BLOOD PRESSURE: 49 MMHG | SYSTOLIC BLOOD PRESSURE: 121 MMHG | TEMPERATURE: 98.2 F | OXYGEN SATURATION: 99 % | BODY MASS INDEX: 28.63 KG/M2 | HEART RATE: 67 BPM | WEIGHT: 200 LBS | HEIGHT: 70 IN | RESPIRATION RATE: 15 BRPM

## 2024-09-22 PROCEDURE — 63710000001 ONDANSETRON ODT 4 MG TABLET DISPERSIBLE: Performed by: NURSE PRACTITIONER

## 2024-09-22 RX ORDER — OXYCODONE AND ACETAMINOPHEN 5; 325 MG/1; MG/1
1 TABLET ORAL EVERY 6 HOURS PRN
Qty: 12 TABLET | Refills: 0 | Status: SHIPPED | OUTPATIENT
Start: 2024-09-22

## 2024-09-22 RX ORDER — ONDANSETRON 4 MG/1
4 TABLET, ORALLY DISINTEGRATING ORAL EVERY 6 HOURS PRN
Qty: 12 TABLET | Refills: 0 | Status: SHIPPED | OUTPATIENT
Start: 2024-09-22

## 2024-09-22 RX ORDER — TRANEXAMIC ACID 10 MG/ML
1000 INJECTION, SOLUTION INTRAVENOUS ONCE
Status: DISCONTINUED | OUTPATIENT
Start: 2024-09-22 | End: 2024-09-22

## 2024-09-22 RX ADMIN — HYDROCODONE BITARTRATE AND ACETAMINOPHEN 1 TABLET: 7.5; 325 TABLET ORAL at 00:13

## 2024-09-22 RX ADMIN — ONDANSETRON 4 MG: 4 TABLET, ORALLY DISINTEGRATING ORAL at 00:13

## 2024-09-27 ENCOUNTER — HOSPITAL ENCOUNTER (INPATIENT)
Facility: HOSPITAL | Age: 44
LOS: 1 days | Discharge: HOME | End: 2024-09-28
Attending: FAMILY MEDICINE | Admitting: INTERNAL MEDICINE
Payer: COMMERCIAL

## 2024-09-27 DIAGNOSIS — D62 ANEMIA DUE TO ACUTE BLOOD LOSS: Primary | ICD-10-CM

## 2024-09-27 DIAGNOSIS — Z86.79 HISTORY OF ARTERIAL OCCLUSION: ICD-10-CM

## 2024-09-27 PROBLEM — D64.9 ANEMIA: Status: ACTIVE | Noted: 2024-09-27

## 2024-09-27 LAB
ABO GROUP BLD: NORMAL
ANION GAP SERPL CALCULATED.3IONS-SCNC: 11 MMOL/L (ref 5–15)
BASOPHILS # BLD AUTO: 0.03 10*3/MM3 (ref 0–0.2)
BASOPHILS NFR BLD AUTO: 0.5 % (ref 0–1.5)
BLD GP AB SCN SERPL QL: NEGATIVE
BUN SERPL-MCNC: 8 MG/DL (ref 6–20)
BUN/CREAT SERPL: 11 (ref 7–25)
CALCIUM SPEC-SCNC: 8.8 MG/DL (ref 8.6–10.5)
CHLORIDE SERPL-SCNC: 103 MMOL/L (ref 98–107)
CO2 SERPL-SCNC: 26 MMOL/L (ref 22–29)
CREAT SERPL-MCNC: 0.73 MG/DL (ref 0.57–1)
DEPRECATED RDW RBC AUTO: 63.7 FL (ref 37–54)
EGFRCR SERPLBLD CKD-EPI 2021: 105.5 ML/MIN/1.73
EOSINOPHIL # BLD AUTO: 0.07 10*3/MM3 (ref 0–0.4)
EOSINOPHIL NFR BLD AUTO: 1.1 % (ref 0.3–6.2)
ERYTHROCYTE [DISTWIDTH] IN BLOOD BY AUTOMATED COUNT: 19.9 % (ref 12.3–15.4)
GLUCOSE SERPL-MCNC: 124 MG/DL (ref 65–99)
HCT VFR BLD AUTO: 20.2 % (ref 34–46.6)
HCT VFR BLD AUTO: 25.1 % (ref 34–46.6)
HGB BLD-MCNC: 5.9 G/DL (ref 12–15.9)
HGB BLD-MCNC: 7.7 G/DL (ref 12–15.9)
IMM GRANULOCYTES # BLD AUTO: 0.11 10*3/MM3 (ref 0–0.05)
IMM GRANULOCYTES NFR BLD AUTO: 1.7 % (ref 0–0.5)
IRON 24H UR-MRATE: 12 MCG/DL (ref 37–145)
IRON SATN MFR SERPL: 3 % (ref 20–50)
LYMPHOCYTES # BLD AUTO: 1.23 10*3/MM3 (ref 0.7–3.1)
LYMPHOCYTES NFR BLD AUTO: 19.2 % (ref 19.6–45.3)
MAGNESIUM SERPL-MCNC: 2.1 MG/DL (ref 1.6–2.6)
MCH RBC QN AUTO: 26.6 PG (ref 26.6–33)
MCHC RBC AUTO-ENTMCNC: 29.2 G/DL (ref 31.5–35.7)
MCV RBC AUTO: 91 FL (ref 79–97)
MONOCYTES # BLD AUTO: 0.45 10*3/MM3 (ref 0.1–0.9)
MONOCYTES NFR BLD AUTO: 7 % (ref 5–12)
NEUTROPHILS NFR BLD AUTO: 4.52 10*3/MM3 (ref 1.7–7)
NEUTROPHILS NFR BLD AUTO: 70.5 % (ref 42.7–76)
NRBC BLD AUTO-RTO: 1.4 /100 WBC (ref 0–0.2)
PLATELET # BLD AUTO: 251 10*3/MM3 (ref 140–450)
PMV BLD AUTO: 9.6 FL (ref 6–12)
POTASSIUM SERPL-SCNC: 3.6 MMOL/L (ref 3.5–5.2)
QT INTERVAL: 420 MS
QTC INTERVAL: 433 MS
RBC # BLD AUTO: 2.22 10*6/MM3 (ref 3.77–5.28)
RH BLD: POSITIVE
SODIUM SERPL-SCNC: 140 MMOL/L (ref 136–145)
T&S EXPIRATION DATE: NORMAL
T4 FREE SERPL-MCNC: 1.11 NG/DL (ref 0.93–1.7)
TIBC SERPL-MCNC: 420 MCG/DL (ref 298–536)
TRANSFERRIN SERPL-MCNC: 282 MG/DL (ref 200–360)
TSH SERPL DL<=0.05 MIU/L-ACNC: 5.35 UIU/ML (ref 0.27–4.2)
WBC NRBC COR # BLD AUTO: 6.41 10*3/MM3 (ref 3.4–10.8)

## 2024-09-27 PROCEDURE — 93010 ELECTROCARDIOGRAM REPORT: CPT | Performed by: HOSPITALIST

## 2024-09-27 PROCEDURE — 80048 BASIC METABOLIC PNL TOTAL CA: CPT | Performed by: FAMILY MEDICINE

## 2024-09-27 PROCEDURE — 85025 COMPLETE CBC W/AUTO DIFF WBC: CPT | Performed by: FAMILY MEDICINE

## 2024-09-27 PROCEDURE — 25010000002 NA FERRIC GLUC CPLX PER 12.5 MG: Performed by: INTERNAL MEDICINE

## 2024-09-27 PROCEDURE — 25810000003 SODIUM CHLORIDE 0.9 % SOLUTION: Performed by: INTERNAL MEDICINE

## 2024-09-27 PROCEDURE — 36415 COLL VENOUS BLD VENIPUNCTURE: CPT | Performed by: INTERNAL MEDICINE

## 2024-09-27 PROCEDURE — 84466 ASSAY OF TRANSFERRIN: CPT | Performed by: HOSPITALIST

## 2024-09-27 PROCEDURE — 85018 HEMOGLOBIN: CPT | Performed by: INTERNAL MEDICINE

## 2024-09-27 PROCEDURE — 63710000001 ONDANSETRON ODT 4 MG TABLET DISPERSIBLE: Performed by: HOSPITALIST

## 2024-09-27 PROCEDURE — 85014 HEMATOCRIT: CPT | Performed by: INTERNAL MEDICINE

## 2024-09-27 PROCEDURE — 84439 ASSAY OF FREE THYROXINE: CPT | Performed by: HOSPITALIST

## 2024-09-27 PROCEDURE — 83735 ASSAY OF MAGNESIUM: CPT | Performed by: FAMILY MEDICINE

## 2024-09-27 PROCEDURE — 86923 COMPATIBILITY TEST ELECTRIC: CPT

## 2024-09-27 PROCEDURE — 84443 ASSAY THYROID STIM HORMONE: CPT | Performed by: FAMILY MEDICINE

## 2024-09-27 PROCEDURE — 86850 RBC ANTIBODY SCREEN: CPT | Performed by: HOSPITALIST

## 2024-09-27 PROCEDURE — 86900 BLOOD TYPING SEROLOGIC ABO: CPT | Performed by: HOSPITALIST

## 2024-09-27 PROCEDURE — 86900 BLOOD TYPING SEROLOGIC ABO: CPT

## 2024-09-27 PROCEDURE — 86901 BLOOD TYPING SEROLOGIC RH(D): CPT | Performed by: HOSPITALIST

## 2024-09-27 PROCEDURE — 99285 EMERGENCY DEPT VISIT HI MDM: CPT

## 2024-09-27 PROCEDURE — 93005 ELECTROCARDIOGRAM TRACING: CPT | Performed by: FAMILY MEDICINE

## 2024-09-27 PROCEDURE — P9016 RBC LEUKOCYTES REDUCED: HCPCS

## 2024-09-27 PROCEDURE — 36430 TRANSFUSION BLD/BLD COMPNT: CPT

## 2024-09-27 PROCEDURE — 83540 ASSAY OF IRON: CPT | Performed by: HOSPITALIST

## 2024-09-27 RX ORDER — ONDANSETRON 4 MG/1
4 TABLET, ORALLY DISINTEGRATING ORAL EVERY 6 HOURS PRN
Status: DISCONTINUED | OUTPATIENT
Start: 2024-09-27 | End: 2024-09-28 | Stop reason: HOSPADM

## 2024-09-27 RX ORDER — MECLIZINE HYDROCHLORIDE 25 MG/1
50 TABLET ORAL ONCE
Status: COMPLETED | OUTPATIENT
Start: 2024-09-27 | End: 2024-09-27

## 2024-09-27 RX ORDER — HYDROXYZINE HYDROCHLORIDE 25 MG/1
50 TABLET, FILM COATED ORAL 3 TIMES DAILY PRN
Status: DISCONTINUED | OUTPATIENT
Start: 2024-09-27 | End: 2024-09-28 | Stop reason: HOSPADM

## 2024-09-27 RX ORDER — CLONAZEPAM 0.5 MG/1
0.5 TABLET ORAL 2 TIMES DAILY PRN
Status: DISCONTINUED | OUTPATIENT
Start: 2024-09-27 | End: 2024-09-28 | Stop reason: HOSPADM

## 2024-09-27 RX ORDER — OXYCODONE AND ACETAMINOPHEN 5; 325 MG/1; MG/1
1 TABLET ORAL EVERY 6 HOURS PRN
Status: DISCONTINUED | OUTPATIENT
Start: 2024-09-27 | End: 2024-09-28 | Stop reason: HOSPADM

## 2024-09-27 RX ORDER — FERROUS SULFATE 325(65) MG
325 TABLET ORAL
Status: DISCONTINUED | OUTPATIENT
Start: 2024-09-27 | End: 2024-09-28 | Stop reason: HOSPADM

## 2024-09-27 RX ORDER — ATENOLOL 25 MG/1
25 TABLET ORAL 2 TIMES DAILY
Status: DISCONTINUED | OUTPATIENT
Start: 2024-09-27 | End: 2024-09-28 | Stop reason: HOSPADM

## 2024-09-27 RX ADMIN — HYDROXYZINE HYDROCHLORIDE 50 MG: 25 TABLET ORAL at 05:04

## 2024-09-27 RX ADMIN — SODIUM CHLORIDE 250 MG: 9 INJECTION, SOLUTION INTRAVENOUS at 21:00

## 2024-09-27 RX ADMIN — ONDANSETRON 4 MG: 4 TABLET, ORALLY DISINTEGRATING ORAL at 12:32

## 2024-09-27 RX ADMIN — ONDANSETRON 4 MG: 4 TABLET, ORALLY DISINTEGRATING ORAL at 23:08

## 2024-09-27 RX ADMIN — OXYCODONE HYDROCHLORIDE AND ACETAMINOPHEN 1 TABLET: 5; 325 TABLET ORAL at 23:08

## 2024-09-27 RX ADMIN — HYDROXYZINE HYDROCHLORIDE 50 MG: 25 TABLET ORAL at 10:51

## 2024-09-27 RX ADMIN — FERROUS SULFATE TAB 325 MG (65 MG ELEMENTAL FE) 325 MG: 325 (65 FE) TAB at 10:26

## 2024-09-27 RX ADMIN — CLONAZEPAM 0.5 MG: 0.5 TABLET ORAL at 14:36

## 2024-09-27 RX ADMIN — MECLIZINE HYDROCHLORIDE 50 MG: 25 TABLET ORAL at 05:03

## 2024-09-27 NOTE — H&P
[facility]  HISTORY AND PHYSICAL    Date of Admission: 2024  Primary Care Physician: Lela Boyle APRN    Subjective   Primary Historian: Patient    Chief Complaint:   NOEL and dizziness    Dizziness       Patient is a 44 yowf with know fibroids and DUB - she was scheduled for a uterine ablation in [place] but developed palpitations and was diagnosed with SVT - possible atrial fibrillation  back in May - being managed by Dr Serrano.  At the same time - a CT angiogram was done and she was found to have an arterial clot in the LLE.  She has been on Eliquis ever since.  Now she is having dizziness with standing - heart racing with minimal exertion and NOEL.  She presents to the ER and her hemoglobin is 5.9.  She will be admitted for blood transfusion and we will consult OB/Gyn to see her while here.      Review of Systems   Respiratory:  Positive for shortness of breath.    Cardiovascular:  Positive for palpitations.   Genitourinary:  Positive for vaginal bleeding.   Neurological:  Positive for dizziness.      Otherwise complete ROS reviewed and negative except as mentioned in the HPI.    Past Medical History:   Past Medical History:   Diagnosis Date    Anemia     Arterial occlusion, lower extremity     Fibroid uterus     Graves disease     Kidney stone 2024    PONV (postoperative nausea and vomiting)     SVT (supraventricular tachycardia)      Past Surgical History:  Past Surgical History:   Procedure Laterality Date    APPENDECTOMY      ARTERIOGRAM LOWER EXTREMITY Left 2024    Procedure: ARTERIOGRAM LOWER EXTREMITY, SUCTION THROMBECTOMY LEFT POPLITEAL ARTERY, BALLOON ANGIOPLASTY LEFT POSTERIOR TIBIAL ARTERY;  Surgeon: Trever Turcios MD;  Location: [facility];  Service: Vascular;  Laterality: Left;     SECTION      ENDOSCOPY      EXTRACORPOREAL SHOCKWAVE LITHOTRIPSY (ESWL), STENT INSERTION/REMOVAL Right 2022    Procedure:  EXTRACORPOREAL SHOCKWAVE LITHOTRIPSY  RIGHT CYSTO STENT PLACEMENT;  Surgeon: Michele Chavez MD;  Location: [facility];  Service: Urology;  Laterality: Right;    TUBAL ABDOMINAL LIGATION Bilateral      Social History:  reports that she has been smoking cigarettes. She has a 13 pack-year smoking history. She has been exposed to tobacco smoke. She has never used smokeless tobacco. She reports current alcohol use. She reports that she does not use drugs.    Family History: family history includes Breast cancer in her paternal aunt.       Allergies:  Allergies   Allergen Reactions    Codeine Other (See Comments)     Unknown        Medications:  Prior to Admission medications    Medication Sig Start Date End Date Taking? Authorizing Provider   apixaban (Eliquis) 5 MG tablet tablet Take 1 tablet by mouth Every 12 (Twelve) Hours. 9/6/24   Lela Boyle APRN   atenolol (TENORMIN) 25 MG tablet Take 1 tablet by mouth 2 (Two) Times a Day. 9/6/24   Lela Boyle APRN   Ferric Maltol (ACCRUFeR) 30 MG capsule Take 1 capsule by mouth 2 (Two) Times a Day. 9/6/24   Lela Boyle APRN   ferrous gluconate (FERGON) 324 MG tablet Take 1 tablet by mouth 2 (Two) Times a Day. 5/14/24   Lela Boyle APRN   ondansetron ODT (ZOFRAN-ODT) 4 MG disintegrating tablet Place 1 tablet on the tongue Every 6 (Six) Hours As Needed for Nausea. 9/22/24   Sheryl Russell APRN   oxyCODONE-acetaminophen (PERCOCET) 5-325 MG per tablet Take 1 tablet by mouth Every 6 (Six) Hours As Needed for Moderate Pain. 9/22/24   Sheryl Russell APRN     I have utilized all available immediate resources to obtain, update, or review the patient's current medications (including all prescriptions, over-the-counter products, herbals, cannabis/cannabidiol products, and vitamin/mineral/dietary (nutritional) supplements).    Objective     Vital Signs: /44 (BP Location: Left arm, Patient Position: Lying)   Pulse 116   Temp 98 °F (36.7 °C) (Oral)    "Resp 16   Ht 177.8 cm (70\")   Wt 88 kg (194 lb)   LMP 09/20/2024 (Exact Date)   SpO2 100%   BMI 27.84 kg/m²   Physical Exam  Vitals and nursing note reviewed.   Constitutional:       General: She is not in acute distress.     Appearance: Normal appearance.   HENT:      Head: Normocephalic and atraumatic.      Nose: Nose normal.      Mouth/Throat:      Mouth: Mucous membranes are moist.      Pharynx: No oropharyngeal exudate or posterior oropharyngeal erythema.   Eyes:      Extraocular Movements: Extraocular movements intact.      Conjunctiva/sclera: Conjunctivae normal.      Pupils: Pupils are equal, round, and reactive to light.   Cardiovascular:      Rate and Rhythm: Normal rate and regular rhythm.      Heart sounds: No murmur heard.     No friction rub. No gallop.   Pulmonary:      Effort: Pulmonary effort is normal.      Breath sounds: Normal breath sounds. No wheezing, rhonchi or rales.   Abdominal:      General: Bowel sounds are normal. There is no distension.      Palpations: Abdomen is soft.      Tenderness: There is no abdominal tenderness. There is no guarding or rebound.   Musculoskeletal:      Cervical back: Normal range of motion. No rigidity.   Skin:     General: Skin is warm and dry.      Capillary Refill: Capillary refill takes less than 2 seconds.      Coloration: Skin is pale. Skin is not jaundiced.   Neurological:      General: No focal deficit present.      Mental Status: She is alert and oriented to person, place, and time.      Cranial Nerves: No facial asymmetry.   Psychiatric:         Attention and Perception: Attention and perception normal.         Mood and Affect: Mood and affect normal.         Speech: Speech normal.         Behavior: Behavior normal. Behavior is cooperative.         Thought Content: Thought content normal.         Cognition and Memory: Cognition and memory normal.         Judgment: Judgment normal.          Results Reviewed:  Lab Results (last 24 hours)       " Procedure Component Value Units Date/Time    Basic Metabolic Panel [[id number]]  (Abnormal) Collected: 09/27/24 0512    Specimen: Blood from Arm, Right Updated: 09/27/24 0550     Glucose 124 mg/dL      BUN 8 mg/dL      Creatinine 0.73 mg/dL      Sodium 140 mmol/L      Potassium 3.6 mmol/L      Chloride 103 mmol/L      CO2 26.0 mmol/L      Calcium 8.8 mg/dL      BUN/Creatinine Ratio 11.0     Anion Gap 11.0 mmol/L      eGFR 105.5 mL/min/1.73     Narrative:      GFR Normal >60  Chronic Kidney Disease <60  Kidney Failure <15      Magnesium [[id number]]  (Normal) Collected: 09/27/24 0512    Specimen: Blood from Arm, Right Updated: 09/27/24 0550     Magnesium 2.1 mg/dL     TSH [[id number]]  (Abnormal) Collected: 09/27/24 0512    Specimen: Blood from Arm, Right Updated: 09/27/24 0549     TSH 5.350 uIU/mL     CBC & Differential [[id number]]  (Abnormal) Collected: 09/27/24 0512    Specimen: Blood from Arm, Right Updated: 09/27/24 0537    Narrative:      The following orders were created for panel order CBC & Differential.  Procedure                               Abnormality         Status                     ---------                               -----------         ------                     CBC Auto Differential[[id number]]        Abnormal            Final result                 Please view results for these tests on the individual orders.    CBC Auto Differential [[id number]]  (Abnormal) Collected: 09/27/24 0512    Specimen: Blood from Arm, Right Updated: 09/27/24 0537     WBC 6.41 10*3/mm3      RBC 2.22 10*6/mm3      Hemoglobin 5.9 g/dL      Hematocrit 20.2 %      MCV 91.0 fL      MCH 26.6 pg      MCHC 29.2 g/dL      RDW 19.9 %      RDW-SD 63.7 fl      MPV 9.6 fL      Platelets 251 10*3/mm3      Neutrophil % 70.5 %      Lymphocyte % 19.2 %      Monocyte % 7.0 %      Eosinophil % 1.1 %      Basophil % 0.5 %      Immature Grans % 1.7 %      Neutrophils, Absolute 4.52 10*3/mm3      Lymphocytes, Absolute 1.23 10*3/mm3       Monocytes, Absolute 0.45 10*3/mm3      Eosinophils, Absolute 0.07 10*3/mm3      Basophils, Absolute 0.03 10*3/mm3      Immature Grans, Absolute 0.11 10*3/mm3      nRBC 1.4 /100 WBC           Imaging Results (Last 24 Hours)       ** No results found for the last 24 hours. **          I have personally reviewed and interpreted the radiology studies and ECG obtained at time of admission.     Assessment / Plan   Assessment:   Active Hospital Problems    Diagnosis     **Anemia      Assessment/Plan:    Symptomatic Anemia - 2' DUB while on Eliquis - hemoglobin down to 5.9 - will check an iron profile and transfuse 2 units pRBCs  DUB 2' Fibroids - her OB/GYN is in [place] - will consult OB/Gyn here for any suggestion - patient is on Eliquis - Uterin e Ablation is currently scheduled in November - jyoti couch have permission from Zach to stop the Eliquis for the procedure  SVT - possible A Fib - mgmt per Dr Serrano - on Eliquis even though her LOLA VASC score is low  Hx LLE Arterial Clot - per CT Angiogram back in May - on Eliquis    Treatment Plan  The patient will be admitted to my service here at [facility].     Medical Decision Making  Number and Complexity of problems: 4 - highly complex situation      Conditions and Status        Condition is worsening.     [facility] Data  External documents reviewed: ER records and old chart  Cardiac tracing (EKG, telemetry) interpretation: NSR 64  Radiology interpretation: None  Labs reviewed: yes  Any tests that were considered but not ordered: no     Decision rules/scores evaluated (example [id number], Wells, etc): none     Discussed with: ER phsician, Patient and her son     Care Planning  Shared decision making: with patient  Code status and discussions:   Full Code    Disposition  Social Determinants of Health that impact treatment or disposition: none  Estimated length of stay is overnight    I confirmed that the patient's advanced care plan is present, code  status is documented, and a surrogate decision maker is listed in the patient's medical record.     The patient's surrogate decision maker is her son Gray    The patient was seen and examined by me on 9/27/2024 at 0600.    Electronically signed by Vinita Hansen MD, 09/27/24, 06:28 CDT.

## 2024-09-27 NOTE — ED PROVIDER NOTES
"Subjective   History of Present Illness  44-year-old  presents to [facility] ER with chief complaint of dizziness.  Discusses that \"woke up my heart was racing my face felt numb, hands felt tingly dizzy.  I have not been able to follow-up with my doctor yet as a result of the last ER visit.  Can you check my labs?  Denies chest pain cough shortness of breath dyspnea or pleurisy.  Bowel bladder unremarkable.  Still having vaginal bleeding.  Neuro otherwise unremarkable.  Constitutional unremarkable.        Review of Systems   All other systems reviewed and are negative.      Past Medical History:   Diagnosis Date    Anemia     Arterial occlusion, lower extremity     Fibroid uterus     Graves disease     Kidney stone 2024    PONV (postoperative nausea and vomiting)     SVT (supraventricular tachycardia)        Allergies   Allergen Reactions    Codeine Other (See Comments)     Unknown        Past Surgical History:   Procedure Laterality Date    APPENDECTOMY      ARTERIOGRAM LOWER EXTREMITY Left 2024    Procedure: ARTERIOGRAM LOWER EXTREMITY, SUCTION THROMBECTOMY LEFT POPLITEAL ARTERY, BALLOON ANGIOPLASTY LEFT POSTERIOR TIBIAL ARTERY;  Surgeon: Trever Turcios MD;  Location: [facility] HYBRID OR;  Service: Vascular;  Laterality: Left;     SECTION      ENDOSCOPY      EXTRACORPOREAL SHOCKWAVE LITHOTRIPSY (ESWL), STENT INSERTION/REMOVAL Right 2022    Procedure: EXTRACORPOREAL SHOCKWAVE LITHOTRIPSY  RIGHT CYSTO STENT PLACEMENT;  Surgeon: Michele Chavez MD;  Location: [facility] OR;  Service: Urology;  Laterality: Right;    TUBAL ABDOMINAL LIGATION Bilateral        Family History   Problem Relation Age of Onset    Breast cancer Paternal Aunt        Social History     Socioeconomic History    Marital status:    Tobacco Use    Smoking status: Every Day     Current packs/day: 1.00     Average packs/day: 1 pack/day for 13.0 years (13.0 ttl pk-yrs)     Types: Cigarettes     Passive " exposure: Current    Smokeless tobacco: Never   Vaping Use    Vaping status: Never Used   Substance and Sexual Activity    Alcohol use: Yes     Comment: recreational    Drug use: Never    Sexual activity: Defer     Partners: Male     Birth control/protection: Bilateral salpingectomy            Objective   Physical Exam  Constitutional:       General: She is in acute distress.      Appearance: She is not ill-appearing, toxic-appearing or diaphoretic.      Comments: Clearly distressed, highly anxious   HENT:      Head: Normocephalic and atraumatic.      Mouth/Throat:      Mouth: Mucous membranes are moist.      Pharynx: Oropharynx is clear.   Eyes:      Extraocular Movements: Extraocular movements intact.      Pupils: Pupils are equal, round, and reactive to light.   Cardiovascular:      Rate and Rhythm: Normal rate and regular rhythm.   Pulmonary:      Effort: Pulmonary effort is normal.      Breath sounds: Normal breath sounds.   Abdominal:      General: Abdomen is flat.      Palpations: Abdomen is soft.   Musculoskeletal:         General: Normal range of motion.      Cervical back: Normal range of motion and neck supple.   Skin:     General: Skin is warm and dry.      Capillary Refill: Capillary refill takes less than 2 seconds.   Neurological:      General: No focal deficit present.      Mental Status: She is alert and oriented to person, place, and time.      Cranial Nerves: No cranial nerve deficit.      Comments: Increased dizziness with changes in position   Psychiatric:         Thought Content: Thought content normal.         Judgment: Judgment normal.      Comments: Highly anxious         Procedures           ED Course                                             Medical Decision Making  Dr. Hansen accepts admission    Amount and/or Complexity of Data Reviewed  Labs: ordered.  ECG/medicine tests: ordered.    Risk  Prescription drug management.        Final diagnoses:   None       ED Disposition  ED  Disposition       None            No follow-up provider specified.       Medication List      No changes were made to your prescriptions during this visit.            Curly Small MD  09/27/24 0600       Curly Small MD  09/27/24 0600     26.2 (*)            All other components within normal limits  MAGNESIUM - Normal  T4, FREE - Normal  PREPARE RBC  TYPE AND SCREEN  CBC AND DIFFERENTIAL    Radiology:      Details: No orders to display    ECG/medicine tests: ordered.    Risk  Prescription drug management.  Decision regarding hospitalization.        Final diagnoses:   None       ED Disposition  ED Disposition       None            No follow-up provider specified.       Medication List      No changes were made to your prescriptions during this visit.            Curly Small MD  09/27/24 0600       Curly Small MD  09/27/24 0600       Curly Small MD  10/09/24 1910       Curly Small MD  10/11/24 0035

## 2024-09-27 NOTE — PLAN OF CARE
"Goal Outcome Evaluation: Patient presented to the ER with complaints of dizziness and heart racing. She states that \"they said I had vertigo, but I asked them to check my labs because something doesn't feel right\". Patient had labwork done and her HGB is 5.9. She will be getting 2 units of PRBCs this shift. No complaints of pain at this time. Blood consent has been signed and patient has been educated on receiving blood. She has not further questions at this time. Patient safety to be maintained this shift, continue to monitor and report abnormal to provider.                                               "

## 2024-09-27 NOTE — PROGRESS NOTES
"Patient was seen earlier this morning by Dr. Hansen and her history and physical have been reviewed.    Discussed with bedside nurse, Bree, this morning - patient is currently boarding in the ED.    Her hemoglobin on 9/21/2024 was 10.5.  Her hemoglobin on arrival to the emergency department was 5.9.  Orders for transfusion of packed red blood cells have been initiated.    Will trend serial hemoglobin and hematocrits.    Will hold anticoagulation with Eliquis.  Complicated situation.  Patient was managed by Dr. Turcios of vascular surgery back in May 2024 for arterial occlusion involving the left lower extremity and was started on anticoagulation at that time.  Based upon my discussion with patient this morning she indicates that a hypercoagulable workup was performed and was negative.  She denies being on any hormonal treatment.  Will need to clarify her smoking status.  Will ask for reassessment by vascular surgery in this circumstance, specifically regarding necessity of anticoagulation given this complicated situation.      At the same time, GYN has also been consulted given her known history of menorrhagia and known fibroids, and this in the setting of ongoing anticoagulation.  Patient states that she was evaluated in the emergency department this past weekend as she was having to wear a diaper (in addition to tampons and pads) given the amount of bleeding she was experiencing - \"it looked like a murder scene.\"    Hold orders for Eliquis-discussed with bedside nurse this morning.    Blood pressure trend reviewed-hold atenolol.    Will give dose of IV ferric gluconate this morning as well.  Patient has been on outpatient ferrous sulfate.    Will also add telemetry monitoring.  Patient is followed by Dr. Serrano of cardiology in the outpatient setting and has a diagnosis of palpitations and paroxysmal SVT.  No formal diagnosis of atrial fibrillation has been made.  Her [id number] score is only 1.  Will place " on telemetry to monitor her rhythm during this hospitalization and hopefully we can resume her beta-blocker in the near future.    Workup ongoing.    Electronically signed by Raman Elizalde MD, 09/27/24, 10:23 CDT.

## 2024-09-28 VITALS
HEART RATE: 75 BPM | DIASTOLIC BLOOD PRESSURE: 50 MMHG | WEIGHT: 194 LBS | RESPIRATION RATE: 16 BRPM | HEIGHT: 70 IN | BODY MASS INDEX: 27.77 KG/M2 | TEMPERATURE: 97.8 F | SYSTOLIC BLOOD PRESSURE: 115 MMHG | OXYGEN SATURATION: 99 %

## 2024-09-28 PROBLEM — Z72.0 TOBACCO ABUSE: Status: ACTIVE | Noted: 2024-09-28

## 2024-09-28 PROBLEM — N93.8 DYSFUNCTIONAL UTERINE BLEEDING: Status: ACTIVE | Noted: 2024-09-28

## 2024-09-28 PROBLEM — D62 ACUTE BLOOD LOSS ANEMIA: Status: ACTIVE | Noted: 2024-09-28

## 2024-09-28 PROBLEM — N92.0 MENORRHAGIA: Status: ACTIVE | Noted: 2024-09-28

## 2024-09-28 LAB
BH BB BLOOD EXPIRATION DATE: NORMAL
BH BB BLOOD EXPIRATION DATE: NORMAL
BH BB BLOOD TYPE BARCODE: 6200
BH BB BLOOD TYPE BARCODE: 6200
BH BB DISPENSE STATUS: NORMAL
BH BB DISPENSE STATUS: NORMAL
BH BB PRODUCT CODE: NORMAL
BH BB PRODUCT CODE: NORMAL
BH BB UNIT NUMBER: NORMAL
BH BB UNIT NUMBER: NORMAL
CROSSMATCH INTERPRETATION: NORMAL
CROSSMATCH INTERPRETATION: NORMAL
HCT VFR BLD AUTO: 26.2 % (ref 34–46.6)
HCT VFR BLD AUTO: 26.9 % (ref 34–46.6)
HGB BLD-MCNC: 7.9 G/DL (ref 12–15.9)
HGB BLD-MCNC: 8.3 G/DL (ref 12–15.9)
UNIT  ABO: NORMAL
UNIT  ABO: NORMAL
UNIT  RH: NORMAL
UNIT  RH: NORMAL

## 2024-09-28 PROCEDURE — 85018 HEMOGLOBIN: CPT | Performed by: INTERNAL MEDICINE

## 2024-09-28 PROCEDURE — 25810000003 SODIUM CHLORIDE 0.9 % SOLUTION: Performed by: INTERNAL MEDICINE

## 2024-09-28 PROCEDURE — 36415 COLL VENOUS BLD VENIPUNCTURE: CPT | Performed by: INTERNAL MEDICINE

## 2024-09-28 PROCEDURE — 25010000002 NA FERRIC GLUC CPLX PER 12.5 MG: Performed by: INTERNAL MEDICINE

## 2024-09-28 PROCEDURE — 85014 HEMATOCRIT: CPT | Performed by: INTERNAL MEDICINE

## 2024-09-28 PROCEDURE — 63710000001 ONDANSETRON ODT 4 MG TABLET DISPERSIBLE: Performed by: HOSPITALIST

## 2024-09-28 RX ADMIN — SODIUM CHLORIDE 250 MG: 9 INJECTION, SOLUTION INTRAVENOUS at 12:42

## 2024-09-28 RX ADMIN — OXYCODONE HYDROCHLORIDE AND ACETAMINOPHEN 1 TABLET: 5; 325 TABLET ORAL at 12:51

## 2024-09-28 RX ADMIN — ONDANSETRON 4 MG: 4 TABLET, ORALLY DISINTEGRATING ORAL at 06:17

## 2024-09-28 RX ADMIN — FERROUS SULFATE TAB 325 MG (65 MG ELEMENTAL FE) 325 MG: 325 (65 FE) TAB at 08:31

## 2024-09-28 RX ADMIN — ONDANSETRON 4 MG: 4 TABLET, ORALLY DISINTEGRATING ORAL at 12:51

## 2024-09-28 RX ADMIN — OXYCODONE HYDROCHLORIDE AND ACETAMINOPHEN 1 TABLET: 5; 325 TABLET ORAL at 06:17

## 2024-09-28 NOTE — DISCHARGE SUMMARY
[facility] Medicine Services  DISCHARGE SUMMARY       Date of Admission: 9/27/2024  Date of Discharge:  9/28/2024  Primary Care Physician: Lela Boyle APRN    Presenting Problem/History of Present Illness:  Symptomatic Anemia    Final Discharge Diagnoses:  Active Hospital Problems    Diagnosis     Tobacco abuse     Acute blood loss anemia     Menorrhagia     Dysfunctional uterine bleeding     History of arterial occlusion     Paroxysmal SVT (supraventricular tachycardia)        Consults: GYN - Dr. Farmer      Pertinent Test Results:   Results for orders placed during the hospital encounter of 05/07/24    Adult Transthoracic Echo Complete W/ Cont if Necessary Per Protocol    Interpretation Summary    Left ventricular systolic function is normal. Left ventricular ejection fraction appears to be 61 - 65%.    Left ventricular wall thickness is consistent with mild concentric hypertrophy.    Left ventricular diastolic function was normal.    Normal right ventricular cavity size and systolic function.    The left atrial cavity is mildly dilated.    The right atrial cavity is borderline dilated.    There are no hemodynamically significant (more than mild) valve abnormalities.      Imaging Results (All)       None          LAB RESULTS:      Lab 09/28/24  0602 09/27/24  2349 09/27/24  1729 09/27/24  0512 09/21/24  1905   WBC  --   --   --  6.41 9.11   HEMOGLOBIN 7.9* 8.3* 7.7* 5.9* 10.5*   HEMATOCRIT 26.2* 26.9* 25.1* 20.2* 32.8*   PLATELETS  --   --   --  251 219   NEUTROS ABS  --   --   --  4.52 6.63   IMMATURE GRANS (ABS)  --   --   --  0.11* 0.06*   LYMPHS ABS  --   --   --  1.23 1.72   MONOS ABS  --   --   --  0.45 0.56   EOS ABS  --   --   --  0.07 0.10   MCV  --   --   --  91.0 84.3   PROTIME  --   --   --   --  15.5*   APTT  --   --   --   --  28.9         Lab 09/27/24  0512 09/21/24  1905   SODIUM 140 142   POTASSIUM 3.6 3.8   CHLORIDE 103 106   CO2 26.0 25.0   ANION GAP  11.0 11.0   BUN 8 7   CREATININE 0.73 0.69   EGFR 105.5 111.3   GLUCOSE 124* 117*   CALCIUM 8.8 8.8   MAGNESIUM 2.1  --    TSH 5.350*  --          Lab 09/21/24 1905   TOTAL PROTEIN 6.7   ALBUMIN 4.3   GLOBULIN 2.4   ALT (SGPT) 18   AST (SGOT) 12   BILIRUBIN 0.2   ALK PHOS 62   LIPASE 47         Lab 09/21/24 1905   PROTIME 15.5*   INR 1.18*             Lab 09/27/24  0742 09/27/24  0512 09/21/24 1905   IRON  --  12*  --    IRON SATURATION (TSAT)  --  3*  --    TIBC  --  420  --    TRANSFERRIN  --  282  --    ABO TYPING A  --  A   RH TYPING Positive  --  Positive   ANTIBODY SCREEN Negative  --  Negative         Brief Urine Lab Results  (Last result in the past 365 days)        Color   Clarity   Blood   Leuk Est   Nitrite   Protein   CREAT   Urine HCG        08/21/24 1437 [place]   Clear   Large   Negative   Positive   Negative                 Microbiology Results (last 10 days)       ** No results found for the last 240 hours. **            Hospital Course:   Patient is a 44-year-old  female with past medical history significant for menorrhagia and heavy menstrual bleeding, history of a left lower extremity arterial clot on anticoagulation with Eliquis, history of tobacco abuse, in addition to paroxysmal SVT the presented to our facility on 9/27/2024 secondary to weakness, dizziness, and dyspnea with exertion.  On arrival patient had blood work completed which revealed a hemoglobin of 5.9.  By comparison, her hemoglobin approximately 1 week ago was 10.5.  Patient reported that she has been having heavy menstrual bleeding while on her period, so much so that she presented to the emergency department on 9/21/2024 however her evaluation at that time including her hemoglobin was stable.  She has follow-up established with her GYN physician at [facility] with plans for an upcoming outpatient endometrial ablation.  Unfortunately, complicating matters is that patient is on chronic  anticoagulation for history of a left lower extremity arterial clot in addition to ? Atrial fibrillation.    I did stop her Eliquis during this hospitalization, she was transfused with packed red blood cells.  Also administered 2 days of intravenous iron therapy.  Her posttransfusion hemoglobin has improved to 7.9, and patient reports that she already feels substantially better.  She also indicates that her menstrual bleeding has essentially stopped.  In fact, for the past couple of days even leading up to this hospitalization she reported that she had only been spotting.  GYN did evaluate patient during this hospitalization.  No invasive procedures or testing planned for this weekend, patient feels much better at this time, and plan to transition to the outpatient setting.  Patient has an established relationship with Dr. Perez of GYN and reports that she will contact this office on Monday to see if an expedited follow-up can be arranged, and hopefully to move up the date for the anticipated endometrial ablation.  I did speak with the patient to inform her that anticoagulation will likely need to be held for at least 24-48 hours prior to this procedure, but those specific recommendations per her GYN surgeon.    We discussed the importance of tobacco cessation.  Patient does report getting a hypercoagulable workup performed in the past which was negative.  I would like her to follow-up with Dr. Turcios of vascular surgery (I have no Vascular Surgery on-call at this time) in approximately 1 week for posthospitalization assessment regarding the above-mentioned issues as well, specifically regarding timeline and necessity for anticoagulation moving forward from a Vascular perspective.  Patient is followed by  of cardiology and it sounds like is good to be seen by Dr. Hurt of EP cardiology in the future for a history of paroxysmal SVT.  I was able to review an outpatient progress note from Dr. Serrano  "and it does not sound like a diagnosis of paroxysmal atrial fibrillation has been definitively finalized.  Her [id number] score is low.  In short, I discussed with patient the need to follow-up with both vascular surgery and cardiology regarding long-term planning for her anticoagulation.  Obviously, if patient has a successful endometrial ablation and her symptoms of menorrhagia and uterine bleeding improve, then decisions regarding anticoagulation may also be easier as well.    Patient has been on telemetry during this hospitalization.  No atrial fibrillation.  She has had sinus rhythm with some PVCs.    Plan for discharge home today with close outpatient follow-up as described.  If patient does have recurrent or worsening bleeding she will return for reassessment.      Physical Exam on Discharge:  /50 (BP Location: Left arm, Patient Position: Sitting)   Pulse 75   Temp 97.8 °F (36.6 °C) (Axillary)   Resp 16   Ht 177.8 cm (70\")   Wt 88 kg (194 lb)   LMP 09/20/2024 (Exact Date)   SpO2 99%   BMI 27.84 kg/m²   Physical Exam  Vitals reviewed.     See my progress note from earlier today    Condition on Discharge: medically stable    Discharge Disposition:  Home or Self Care    Discharge Medications:     Discharge Medications        Continue These Medications        Instructions Start Date   apixaban 5 MG tablet tablet  Commonly known as: Eliquis   5 mg, Oral, Every 12 Hours      atenolol 25 MG tablet  Commonly known as: TENORMIN   25 mg, Oral, 2 Times Daily      ferrous gluconate 324 MG tablet  Commonly known as: FERGON   324 mg, Oral, 2 Times Daily      ondansetron ODT 4 MG disintegrating tablet  Commonly known as: ZOFRAN-ODT   4 mg, Translingual, Every 6 Hours PRN      oxyCODONE-acetaminophen 5-325 MG per tablet  Commonly known as: PERCOCET   1 tablet, Oral, Every 6 Hours PRN               Discharge Diet: as tolerated    Activity at Discharge: as tolerated    Follow-up Appointments:   Future " Appointments   Date Time Provider Department Center   10/15/2024  1:15 PM Jennifer Blanca APRN [facility] U PAD PAD   11/1/2024  1:00 PM Estefany Hurt MD [facility] CD PAD PAD   12/6/2024 11:00 AM  [facility] LAB  PAD CCLAB PAD   12/6/2024 11:30 AM Mhasa Purvis MD [facility] ONC PAD PAD   3/5/2025  1:00 PM Swapnil Serrano MD [facility] CD  PAD   Follow-up ASAP with Dr. Perez of GYN at [facility].  Discussed with patient that if she has issues getting timely follow-up to reach out to us if needed for follow-up at [facility].  In addition, would like to arrange outpatient follow-up with Dr. Turcios of vascular surgery.    Test Results Pending at Discharge: none    Electronically signed by Raman Elizalde MD, 09/28/24, 13:44 CDT.    Time: 50 minutes.

## 2024-09-28 NOTE — PLAN OF CARE
Goal Outcome Evaluation:  Plan of Care Reviewed With: patient  Progress: improving         Pt medicated with prn pain med x1 this shift for c/o back discomfort. IV Iron given x1. H/H cont Q6H; most recent hgb 8.3 Up ad kareen. Voiding per BRP. NSR on tele. VSS. Safety maintained.

## 2024-09-28 NOTE — CONSULTS
[facility]   Consult Note    Patient Name: Denise Ahn  : 1980  MRN: [id number]  Primary Care Physician:  Lela Boyle APRN  Referring Physician: Raman Elizalde MD  Date of admission: 2024    Inpatient Obstetrics / Gynecology Consult  Consult performed by: Valdemar Farmer DO  Consult ordered by: Vinita Hansen MD        Subjective   Subjective     Reason for Consult/ Chief Complaint: Fibroids, heavy bleeding    Denise Ahn is a 44 y.o. female with known leiomyomata who was admitted to the hospitalist team yesterday after she presented with dizziness and acute loss anemia with a hemoglobin of 5.9.  She reports her periods have been heavy since February of this year, but this one has been the heaviest.  Her prior period was heavy but not as bad as this 1 and lasted over 3 weeks.  In the last week she may becoming very fatigued and dizzy which prompted her to come in.  She explained she was originally scheduled for a hysteroscopy and ablation earlier this year, but she developed a arterial clot in her left leg and was put on Eliquis.  Surgery has been rescheduled to November.    Since admission her Eliquis has been held for the last day.  She reports her bleeding has now centrally stopped.  She feels much better now status post blood transfusion.     Personal History     Past Medical History:   Diagnosis Date    Anemia     Arterial occlusion, lower extremity     Fibroid uterus     Graves disease     Kidney stone 2024    PONV (postoperative nausea and vomiting)     SVT (supraventricular tachycardia)        Past Surgical History:   Procedure Laterality Date    APPENDECTOMY      ARTERIOGRAM LOWER EXTREMITY Left 2024    Procedure: ARTERIOGRAM LOWER EXTREMITY, SUCTION THROMBECTOMY LEFT POPLITEAL ARTERY, BALLOON ANGIOPLASTY LEFT POSTERIOR TIBIAL ARTERY;  Surgeon: Trever Turcios MD;  Location: [facility] HYBRID OR;  Service: Vascular;  Laterality: Left;      SECTION      ENDOSCOPY      EXTRACORPOREAL SHOCKWAVE LITHOTRIPSY (ESWL), STENT INSERTION/REMOVAL Right 03/23/2022    Procedure: EXTRACORPOREAL SHOCKWAVE LITHOTRIPSY  RIGHT CYSTO STENT PLACEMENT;  Surgeon: Michele Chavez MD;  Location: [facility];  Service: Urology;  Laterality: Right;    TUBAL ABDOMINAL LIGATION Bilateral        Family History: family history includes Breast cancer in her paternal aunt. Otherwise pertinent FHx was reviewed and not pertinent to current issue.    Social History:  reports that she has been smoking cigarettes. She has a 13 pack-year smoking history. She has been exposed to tobacco smoke. She has never used smokeless tobacco. She reports current alcohol use. She reports that she does not use drugs.    Home Medications:   apixaban, atenolol, ferrous gluconate, ondansetron ODT, and oxyCODONE-acetaminophen    Allergies:  Allergies   Allergen Reactions    Codeine Other (See Comments)     Unknown        Objective    Objective     Vitals:  Temp:  [97.8 °F (36.6 °C)-98.8 °F (37.1 °C)] 98.1 °F (36.7 °C)  Heart Rate:  [50-65] 50  Resp:  [16] 16  BP: ()/(34-52) 96/45    Physical Exam  Vitals reviewed.   Constitutional:       Appearance: Normal appearance.   Neurological:      Mental Status: She is alert.         Result Review    Result Review:  I have personally reviewed the results from the time of this admission to 9/28/2024 12:48 CDT and agree with these findings:  [x]  Laboratory list / accordion  []  Microbiology  [x]  Radiology  []  EKG/Telemetry   []  Cardiology/Vascular   []  Pathology  []  Old records  []  Other:  Most notable findings include:   CBC from 9/21/2024 showed hemoglobin of 10.5.  1 week later on 9/27 her hemoglobin had dropped to a 5.9 which was with this admission.  This morning her hemoglobin is now 7.9 status posttransfusion.    Ultrasound from 9/21/2024 showed a subserosal and intramural leiomyoma.  On my review of the images the endometrial stripe was measured  "at 5 mm.  The smaller intramural leiomyoma does not appear to be in contact with the endometrial cavity.  \"The uterus measures approximately 8.4 x 5.2 x 4.5 cm. Uterus is  heterogeneous in echotexture. A subserosal 3.3 x 3.4 cm leiomyoma  suspected along the anterior uterine fundus. 2.7 cm intramural anterior  uterine leiomyoma considered. Heterogeneous appearance of the  endometrium may relate to the presence of blood products.\"      Assessment & Plan   Assessment / Plan     Brief Patient Summary:  Denise Ahn is a 44 y.o. with heavy menstrual bleeding causing acute blood loss anemia down to a hemoglobin of 5.9.  She has a leiomyomatous uterus although the intramural leiomyoma does not appear to be in contact with the endometrial cavity.  She reports heavy menses for about the last 7 months.  Prior to this admission her lowest hemoglobin in the last few months had been 8.1 on May 8, 2024, then had got up to 10.5 on 9/21/2024.  She reports her heavy menses started the month before starting the the Eliquis, although I suspect that is contributing to the volume of bleeding.    Currently her hemoglobin has improved and bleeding has essentially stopped.  She reports feeling significantly better without dizziness compared to admission.  She is currently established with a GYN and scheduled for a endometrial ablation, which is likely the best option giving medical management poses risk of clot.  I feel it'd be appropriate to discharge her when cleared by primary team and to follow-up in the near future with her GYN.      active Hospital Problems:  Active Hospital Problems    Diagnosis     Tobacco abuse     Acute blood loss anemia     Menorrhagia     Dysfunctional uterine bleeding     History of arterial occlusion     Paroxysmal SVT (supraventricular tachycardia)          Valdemar Farmer, DO    "

## 2024-09-28 NOTE — PROGRESS NOTES
"    [facility] Medicine Services  INPATIENT PROGRESS NOTE    Patient Name: Denise Ahn  Date of Admission: 9/27/2024  Today's Date: 09/28/24  Length of Stay: 1  Primary Care Physician: Lela Boyle APRN    Subjective   Chief Complaint: follow-up anemia  HPI   Patient reports that she feels like a different person after getting transfusion of packed red blood cells.  She also received a dose of IV iron.  She reports that her menstrual bleeding has also improved with \"maybe a little bit of spotting.\"  She did not get her Eliquis yesterday.  GYN assessment is still pending.  Patient voices no other new complaints this morning.    Review of Systems   All pertinent negatives and positives are as above. All other systems have been reviewed and are negative unless otherwise stated.     Objective    Temp:  [97.8 °F (36.6 °C)-98.9 °F (37.2 °C)] 98.1 °F (36.7 °C)  Heart Rate:  [50-66] 50  Resp:  [16] 16  BP: ()/(34-52) 96/45  Physical Exam  Vitals reviewed.   Constitutional:       General: She is not in acute distress.     Appearance: She is not toxic-appearing.   HENT:      Head: Normocephalic.      Mouth/Throat:      Mouth: Mucous membranes are moist.   Pulmonary:      Effort: Pulmonary effort is normal. No respiratory distress.   Skin:     Coloration: Skin is not pale (her color is MUCH better).   Neurological:      General: No focal deficit present.      Mental Status: She is alert.   Psychiatric:         Mood and Affect: Mood normal.       Results Review:  I have reviewed the labs, radiology results, and diagnostic studies.    Laboratory Data:   Results from last 7 days   Lab Units 09/28/24  0602 09/27/24  2349 09/27/24  1729 09/27/24  0512 09/21/24  1905   WBC 10*3/mm3  --   --   --  6.41 9.11   HEMOGLOBIN g/dL 7.9* 8.3* 7.7* 5.9* 10.5*   HEMATOCRIT % 26.2* 26.9* 25.1* 20.2* 32.8*   PLATELETS 10*3/mm3  --   --   --  251 219        Results from last 7 days   Lab Units " "09/27/24  0512 09/21/24  1905   SODIUM mmol/L 140 142   POTASSIUM mmol/L 3.6 3.8   CHLORIDE mmol/L 103 106   CO2 mmol/L 26.0 25.0   BUN mg/dL 8 7   CREATININE mg/dL 0.73 0.69   CALCIUM mg/dL 8.8 8.8   BILIRUBIN mg/dL  --  0.2   ALK PHOS U/L  --  62   ALT (SGPT) U/L  --  18   AST (SGOT) U/L  --  12   GLUCOSE mg/dL 124* 117*       Culture Data:   No results found for: \"BLOODCX\", \"URINECX\", \"WOUNDCX\", \"MRSACX\", \"RESPCX\", \"STOOLCX\"    Radiology Data:   Imaging Results (Last 24 Hours)       ** No results found for the last 24 hours. **            I have reviewed the patient's current medications.     Assessment/Plan   Assessment  Active Hospital Problems    Diagnosis     Tobacco abuse     Acute blood loss anemia     Menorrhagia     Dysfunctional uterine bleeding     History of arterial occlusion     Paroxysmal SVT (supraventricular tachycardia)        Treatment Plan  Posttransfusion hemoglobin currently at 7.9-patient reports that she feels substantially better  Plan to repeat dose of IV ferric gluconate today  Eliquis has been on hold  GYN consultation pending; appreciate their assistance  Patient remains in sinus rhythm on telemetry monitoring with some occasional PVCs  Plan to restart low-dose atenolol  Patient followed by Dr. Turcios in the outpatient setting; no vascular coverage available this weekend.  Plan for outpatient follow-up with Dr. Turcios regarding necessity of full anticoagulation from his perspective.  She has now been on anticoagulation for greater than 3 months.  Per report, her hypercoagulable panel was negative.  She is not on any hormonal treatment.  She does continue to smoke, however  Tobacco cessation counseling  In addition, plan for outpatient follow-up with Dr. Serrano with cardiology; patient has a history of paroxysmal SVT and there is a question about ? Atrial fibrillation.  So far, patient has not had any atrial fibrillation on telemetry during this hospitalization.  Her [id number] " score is low at 1.  Moving forward, I discussed with patient and significant other the importance of getting further clarity regarding the necessity of anticoagulation, short-term versus long-term.  Consider outpatient [facility].  Dispo - pending GYN assessment and recommendations    Medical Decision Making  Number and Complexity of problems: High complexity      Conditions and Status        Condition is improving.     [facility] Data  External documents reviewed: none  Cardiac tracing (EKG, telemetry) interpretation: remains in sinus rhythm; PVCs noted  Radiology interpretation: no new radiology studies  Labs reviewed: as above  Any tests that were considered but not ordered: none     Decision rules/scores evaluated (example [id number], [place], etc): none     Discussed with: Patient, significant other, and bedside nurse, Cindy     Care Planning  Shared decision making: Discussed with patient with agreement to proceed with treatment plan as outlined  Code status and discussions: Full code    Disposition  Social Determinants of Health that impact treatment or disposition: None apparent at this time  I expect the patient to be discharged to home pending GYN recommendations    Electronically signed by Raman Elizalde MD, 09/28/24, 10:56 CDT.

## 2024-09-29 ENCOUNTER — READMISSION MANAGEMENT (OUTPATIENT)
Dept: CALL CENTER | Facility: HOSPITAL | Age: 44
End: 2024-09-29
Payer: COMMERCIAL

## 2024-09-30 ENCOUNTER — TRANSITIONAL CARE MANAGEMENT TELEPHONE ENCOUNTER (OUTPATIENT)
Dept: CALL CENTER | Facility: HOSPITAL | Age: 44
End: 2024-09-30
Payer: COMMERCIAL

## 2024-09-30 ENCOUNTER — NURSE TRIAGE (OUTPATIENT)
Dept: CALL CENTER | Facility: HOSPITAL | Age: 44
End: 2024-09-30
Payer: COMMERCIAL

## 2024-09-30 NOTE — TELEPHONE ENCOUNTER
I had blood transfusion at [facility] on 09/27 d/c 09/28, I was called today to check on me, told them I was fine, I have noticed there is a sore and raised spot above where the needle went in, no redness or bruising noted, little firmer spot  size of afshan, but no bruising or  redness, I just now touched it for first time and noted a little firm spot, no new issue, told her if any changes, redness or streaks be seen, other wise continue to monitor site and call if needed.

## 2024-09-30 NOTE — OUTREACH NOTE
Call Center TCM Note      Flowsheet Row Responses   [facility] patient discharged from? [place]   Does the patient have one of the following disease processes/diagnoses(primary or secondary)? Other   TCM attempt successful? Yes   Call start time 1117   Call end time 1119   Discharge diagnosis Symptomatic Anemia   Meds reviewed with patient/caregiver? Yes   Does the patient have all medications ordered at discharge? N/A   Is the patient taking all medications as directed (includes completed medication regime)? Yes   Does the patient have an appointment with their PCP within 7-14 days of discharge? Yes   Has home health visited the patient within 72 hours of discharge? N/A   Psychosocial issues? No   Did the patient receive a copy of their discharge instructions? Yes   Nursing interventions Reviewed instructions with patient   What is the patient's perception of their health status since discharge? Improving   Is the patient/caregiver able to teach back signs and symptoms related to disease process for when to call PCP? Yes   Is the patient/caregiver able to teach back signs and symptoms related to disease process for when to call 911? Yes   Is the patient/caregiver able to teach back the hierarchy of who to call/visit for symptoms/problems? PCP, Specialist, Home health nurse, Urgent Care, ED, 911 Yes   TCM call completed? Yes   Call end time 1119   Would this patient benefit from a Referral to Amb Social Work? No   Is the patient interested in additional calls from an ambulatory ? No            Susan Murray LPN    9/30/2024, 11:21 CDT

## 2024-09-30 NOTE — TELEPHONE ENCOUNTER
"Reason for Disposition  • Small area of skin swelling (no redness or pain) at prior IV site    Additional Information  • Negative: SEVERE difficulty breathing (e.g., struggling for each breath, speaks in single words)  • Negative: Shock suspected (e.g., cold/pale/clammy skin, too weak to stand, low BP, rapid pulse)  • Negative: Sounds like a life-threatening emergency to the triager  • Negative: IV not running or running slowly  • Negative: [1] Difficulty breathing AND [2] not severe  • Negative: Arm is swollen, new-onset (or leg swelling if IV in lower extremity)  • Negative: Fever > 100.4 F (38.0 C)  • Negative: Patient sounds very sick or weak to the triager  • Negative: Pus or cloudy fluid from IV site  • Negative: [1] Red streak at IV site AND [2] palpable cord  • Negative: [1] Red streak at IV site AND [2] longer than 1 inch (2.5 cm)  • Negative: [1] Skin redness AND [2] extends > 1 inch (2.5 cm) from IV site  • Negative: Skin swelling at IV site (Exception: IV recently removed and small area of skin swelling)  • Negative: [1] Raised bruise at IV site AND [2] size > 2 inches (5 cm) AND [3] expanding  • Negative: [1] Pain at IV site or shooting up arm AND [2] IV running slowly  • Negative: [1] Mild bleeding at IV site AND [2] not stopped after following CARE ADVICE  • Negative: [1] Dressing needs to be changed (e.g., wet, soiled, loose, or open to air) AND [2] unable or unwilling to perform dressing change  • Negative: [1] Small area of skin redness at IV site (<1 inch or 2.5 cm) AND [2] no fever, swelling  • Negative: [1] Pain at IV site or shooting up arm AND [2] NO redness or swelling AND [3]  IV running normally  • Negative: [1] Pain at IV site or shooting up arm AND [2] NO redness or swelling AND [3] IV has been removed  • Negative: Small painless lump at prior IV site    Answer Assessment - Initial Assessment Questions  1. SYMPTOM:  \"What's the main symptom you're concerned about?\" (e.g., pain, redness, " "swelling, pus)      Little swelling no redness firm spot  2. ONSET: \"When did the swelling  start?\"      Since infusion  3. IV WHAT: \"What kind of IV line do you have?\" (e.g., central line, PICC, peripheral IV)      blood  4. IV WHERE: \"Where does the IV enter your body?\"      IV was in bend of arm  5. IV WHEN: \"When was this IV put in?\"      09/27 taken out 09/28  6. IV WHY: \"Why do you have this IV line?\"      Had Blood infusion  7. IV RUNNING OK: \"Is the IV running OK?\" (e.g., running OK, running slowly, not running, unable to flush)       It ran ok no issues  8. PAIN: \"Is there any pain?\" If Yes, ask: \"How bad is the pain?\"   (e.g., scale 1-10; or mild, moderate, severe)      Mild pain soreness  9. OTHER SYMPTOMS: \"Do you have any other symptoms?\" (e.g., fever, shaking chills, new weakness, pus)      no  10. VISITING NURSE: \"Do you have a visiting nurse?\" (e.g., home health nurse, IV infusion nurse)        no    Protocols used: IV Site (Skin) Symptoms-ADULT-AH    "

## 2024-10-01 ENCOUNTER — PATIENT MESSAGE (OUTPATIENT)
Dept: FAMILY MEDICINE CLINIC | Facility: CLINIC | Age: 44
End: 2024-10-01
Payer: COMMERCIAL

## 2024-10-01 NOTE — TELEPHONE ENCOUNTER
Pt had an ultrasound today on it and it is a superficial blood clot, she was full of anxiety when requesting something for anxiety this morning, she states that things just keep happening and the last 5 months it is one thing after another. She feels better about the spot on her arm but is now dealing with this anxiety.

## 2024-10-01 NOTE — TELEPHONE ENCOUNTER
Lets put her on a telehealth because she needs more than just an anxiolytic as we discussed last time. Have plenty of openings tomorrow.

## 2024-10-02 ENCOUNTER — TELEMEDICINE (OUTPATIENT)
Dept: FAMILY MEDICINE CLINIC | Facility: CLINIC | Age: 44
End: 2024-10-02
Payer: COMMERCIAL

## 2024-10-02 VITALS — BODY MASS INDEX: 27.77 KG/M2 | WEIGHT: 194 LBS | HEIGHT: 70 IN

## 2024-10-02 DIAGNOSIS — F41.8 SITUATIONAL ANXIETY: ICD-10-CM

## 2024-10-02 DIAGNOSIS — D50.0 BLOOD LOSS ANEMIA: ICD-10-CM

## 2024-10-02 DIAGNOSIS — F41.9 ANXIETY DISORDER, UNSPECIFIED TYPE: Primary | ICD-10-CM

## 2024-10-02 DIAGNOSIS — Z86.79 HISTORY OF ARTERIAL OCCLUSION: ICD-10-CM

## 2024-10-02 PROCEDURE — 99214 OFFICE O/P EST MOD 30 MIN: CPT | Performed by: NURSE PRACTITIONER

## 2024-10-02 RX ORDER — LORAZEPAM 0.5 MG/1
0.5 TABLET ORAL EVERY 8 HOURS PRN
Qty: 20 TABLET | Refills: 0 | Status: SHIPPED | OUTPATIENT
Start: 2024-10-02

## 2024-10-02 RX ORDER — DESVENLAFAXINE 50 MG/1
TABLET, FILM COATED, EXTENDED RELEASE ORAL
Qty: 30 TABLET | Refills: 1 | Status: SHIPPED | OUTPATIENT
Start: 2024-10-02

## 2024-10-02 NOTE — PROGRESS NOTES
"This was an audio and video enabled telemedicine encounter. Patient verbally consented to visit. Patient was located at Home and I was located at [facility]  location.      Chief Complaint  Anxiety    Subjective    History of Present Illness      Patient presents to [facility] for   History of Present Illness  Anxiety, emotional. Says she was just hospitalized and had significant blood loss, they stopped her anticoag and then she developed another arterial clot in her arm. She She is supposed to be having a gyn surgery and vascular has given her a Lovenox protocol but she is extremely anxious, emotional, worried about her health, doesn't understand why this is happening to her. Denies any HI, SI. She would like to discuss anxiety treatment.        Review of Systems    I have reviewed and agree with the HPI and ROS information as above.  MELLISAS Ching     Objective   Vital Signs:   Ht 177.8 cm (70\")   Wt 88 kg (194 lb)   BMI 27.84 kg/m²            Physical Exam  Constitutional:       Appearance: She is well-developed.   HENT:      Head: Normocephalic and atraumatic.      Nose: No congestion.      Mouth/Throat:      Lips: Pink. No lesions.   Eyes:      General: Lids are normal. Vision grossly intact.      Conjunctiva/sclera: Conjunctivae normal.      Right eye: Right conjunctiva is not injected.      Left eye: Left conjunctiva is not injected.   Pulmonary:      Effort: Pulmonary effort is normal.   Musculoskeletal:         General: Normal range of motion.      Cervical back: Full passive range of motion without pain, normal range of motion and neck supple.   Neurological:      Mental Status: She is alert and oriented to person, place, and time.      Motor: Motor function is intact.   Psychiatric:         Mood and Affect: Mood is anxious. Affect is tearful.         Judgment: Judgment normal.             Result Review  Data Reviewed:                   Assessment and Plan  "     Diagnoses and all orders for this visit:    1. Anxiety disorder, unspecified type (Primary)  -     desvenlafaxine (Pristiq) 50 MG 24 hr tablet; 1/2 tab PO daily x 1 week then increase to 1 tab PO daily  Dispense: 30 tablet; Refill: 1    2. Situational anxiety  -     LORazepam (Ativan) 0.5 MG tablet; Take 1 tablet by mouth Every 8 (Eight) Hours As Needed for Anxiety.  Dispense: 20 tablet; Refill: 0    3. History of arterial occlusion    4. Blood loss anemia  -     CBC w AUTO Differential; Future  -     Basic metabolic panel; Future    Plan:   Start Pristiq daily, ativan prn.   Lab prior to hospital follow up on Friday.           Follow Up   Return in about 1 month (around 11/2/2024).  Patient was given instructions and counseling regarding her condition or for health maintenance advice. Please see specific information pulled into the AVS if appropriate.

## 2024-10-02 NOTE — PATIENT INSTRUCTIONS
Discharge Instructions -Anxiety Disorder Follow Up     - You will need to return to the office as instructed for follow up, or sooner if you develop symptoms  - Medication should be taken first thing in the morning  - It is your responsibility to safe guard your medication  - If you develop any symptoms such as headache, changes in weight, loss of appetite, chest pain, palpitations, or change in behavior, please contact our office immediately or go to your local emergency rooms for any emergent needs.  - Your next appointment will be walk in visit.  Dr. Dejesus is here Monday-Thursdays, and you will need to be signed in by 3 pm in order to guarantee your prescription is filled that day.  You may be seen on Fridays or Saturdays for medication follow up as well.

## 2024-10-03 NOTE — PROGRESS NOTES
Subjective    Ms. Ahn is 44 y.o. female    Chief Complaint: Follow-up nephrolithiasis    History of Present Illness    44-year-old female established patient in for follow-up regarding nephrolithiasis.  Reports shortly after last office visit with SHASHI Doran passed the 4 mm left distal ureteral stone.  Patient came to office and had stone sent for tissue pathology which revealed calcium oxalate stone disease.  Since stone passage denies any further flank pain.  Patient did experience an episode of significant vaginal bleeding resulting in hospital admission as hemoglobin was down in the fives.  Patient reports an extensive history of uterine fibroids.  .  She had a previous right ESWL with stent placement 03/20/2022.     The following portions of the patient's history were reviewed and updated as appropriate: allergies, current medications, past family history, past medical history, past social history, past surgical history and problem list.    Review of Systems   Constitutional:  Negative for chills and fever.   Gastrointestinal:  Negative for abdominal pain, anal bleeding, blood in stool, nausea and vomiting.   Genitourinary:  Negative for dysuria, flank pain and hematuria.         Current Outpatient Medications:     apixaban (Eliquis) 5 MG tablet tablet, Take 1 tablet by mouth Every 12 (Twelve) Hours., Disp: 183 tablet, Rfl: 1    atenolol (TENORMIN) 25 MG tablet, Take 1 tablet by mouth 2 (Two) Times a Day., Disp: 180 tablet, Rfl: 1    desvenlafaxine (Pristiq) 50 MG 24 hr tablet, 1/2 tab PO daily x 1 week then increase to 1 tab PO daily, Disp: 30 tablet, Rfl: 1    ferrous gluconate (FERGON) 324 MG tablet, Take 1 tablet by mouth 2 (Two) Times a Day., Disp: 60 tablet, Rfl: 2    LORazepam (Ativan) 0.5 MG tablet, Take 1 tablet by mouth Every 8 (Eight) Hours As Needed for Anxiety., Disp: 20 tablet, Rfl: 0    ondansetron ODT (ZOFRAN-ODT) 4 MG disintegrating tablet, Place 1 tablet on the tongue Every 6 (Six)  "Hours As Needed for Nausea., Disp: 12 tablet, Rfl: 0    Past Medical History:   Diagnosis Date    Anemia     Arterial occlusion, lower extremity     Fibroid uterus     Graves disease     Kidney stone 2024    PONV (postoperative nausea and vomiting)     SVT (supraventricular tachycardia)        Past Surgical History:   Procedure Laterality Date    APPENDECTOMY      ARTERIOGRAM LOWER EXTREMITY Left 2024    Procedure: ARTERIOGRAM LOWER EXTREMITY, SUCTION THROMBECTOMY LEFT POPLITEAL ARTERY, BALLOON ANGIOPLASTY LEFT POSTERIOR TIBIAL ARTERY;  Surgeon: Trever Turcios MD;  Location: [facility] HYBRID OR;  Service: Vascular;  Laterality: Left;     SECTION      ENDOSCOPY      EXTRACORPOREAL SHOCKWAVE LITHOTRIPSY (ESWL), STENT INSERTION/REMOVAL Right 2022    Procedure: EXTRACORPOREAL SHOCKWAVE LITHOTRIPSY  RIGHT CYSTO STENT PLACEMENT;  Surgeon: Michele Chavez MD;  Location: [facility] OR;  Service: Urology;  Laterality: Right;    TUBAL ABDOMINAL LIGATION Bilateral        Social History     Socioeconomic History    Marital status:    Tobacco Use    Smoking status: Every Day     Current packs/day: 1.00     Average packs/day: 1 pack/day for 13.0 years (13.0 ttl pk-yrs)     Types: Cigarettes     Passive exposure: Current    Smokeless tobacco: Never   Vaping Use    Vaping status: Never Used   Substance and Sexual Activity    Alcohol use: Yes     Comment: recreational    Drug use: Never    Sexual activity: Defer     Partners: Male     Birth control/protection: Bilateral salpingectomy        Family History   Problem Relation Age of Onset    Breast cancer Paternal Aunt        Objective    Temp 97.6 °F (36.4 °C)   Ht 177.8 cm (70\")   Wt 91.2 kg (201 lb)   LMP 2024 (Exact Date)   BMI 28.84 kg/m²     Physical Exam  Nursing note reviewed.   Constitutional:       General: She is not in acute distress.     Appearance: Normal appearance. She is not ill-appearing.   HENT:      Nose: No congestion. "   Abdominal:      Tenderness: There is no right CVA tenderness or left CVA tenderness.      Hernia: No hernia is present.   Skin:     General: Skin is warm and dry.   Neurological:      Mental Status: She is alert and oriented to person, place, and time.   Psychiatric:         Mood and Affect: Mood normal.         Behavior: Behavior normal.             Results for orders placed or performed in visit on 10/04/24   Basic metabolic panel    Collection Time: 10/04/24  8:01 AM    Specimen: Blood   Result Value Ref Range    Glucose 114 (H) 70 - 100 mg/dL    BUN 9 5 - 21 mg/dL    Creatinine 0.73 0.50 - 1.40 mg/dL    Sodium 140 135 - 145 mmol/L    Potassium 4.3 3.5 - 5.3 mmol/L    Chloride 105 98 - 110 mmol/L    CO2 22.0 (L) 24.0 - 31.0 mmol/L    Calcium 8.9 8.6 - 10.5 mg/dL    BUN/Creatinine Ratio 12.3      Anion Gap 13.0 4.0 - 13.0 mmol/L    eGFR 105.5 >60.0 mL/min/1.73   CBC Auto Differential    Collection Time: 10/04/24  8:01 AM    Specimen: Blood   Result Value Ref Range    WBC 10.00 3.40 - 10.80 10*3/mm3    RBC 3.86 3.77 - 5.28 10*6/mm3    Hemoglobin 10.7 (L) 12.0 - 15.9 g/dL    Hematocrit 35.8 34.0 - 46.6 %    MCV 92.7 79.0 - 97.0 fL    MCH 27.7 26.6 - 33.0 pg    MCHC 29.9 (L) 31.5 - 35.7 g/dL    RDW 18.3 (H) 12.3 - 15.4 %    MPV 8.0 6.0 - 12.0 fL    Platelets 307 140 - 450 10*3/mm3    Neutrophil % 74.1 42.7 - 76.0 %    Lymphocyte % 19.1 (L) 19.6 - 45.3 %    Auto Mixed Cells % 6.8 0.1 - 24.0 %    Neutrophils, Absolute 7.40 (H) 1.70 - 7.00 10*3/mm3    Lymphocytes, Absolute 1.90 0.70 - 3.10 10*3/mm3    Auto Mixed Cells # 0.70 0.10 - 2.60 10*3/mm3   TSH    Collection Time: 10/04/24 10:37 AM    Specimen: Blood   Result Value Ref Range    TSH 2.790 0.270 - 4.200 uIU/mL   KUB independent review    A KUB is available for me to review today.  The image is inspected for a bowel gas pattern and the general bone structure of the spine and pelvis. The kidneys are then inspected closely.  Renal outline is noted if identifiable.  The kidney, collecting system, and anticipated path of the ureter are examined for calcifications including those in the true pelvis.  This film reveals:    On the right there are no calcificaitons seen in the kidney or the expected course of the ureter. .    On the left there are no calcificaitons seen in the kidney or the expected course of the ureter. .    Assessment and Plan    Diagnoses and all orders for this visit:    1. Ureteral stone (Primary)  -     XR Abdomen KUB; Future      Passed the ureteral stone.  It was sent for tissue pathology revealing calcium oxalate stone disease.  Have provided patient a handout on calcium oxalate diet have encouraged lemonade therapy and increased water intake to consume 3 to 4 L daily to produce an output of at least 2 L    KUB today no further stones visualized.    Follow-up 1 year

## 2024-10-04 ENCOUNTER — OFFICE VISIT (OUTPATIENT)
Dept: FAMILY MEDICINE CLINIC | Facility: CLINIC | Age: 44
End: 2024-10-04
Payer: COMMERCIAL

## 2024-10-04 ENCOUNTER — LAB (OUTPATIENT)
Dept: LAB | Facility: HOSPITAL | Age: 44
End: 2024-10-04
Payer: COMMERCIAL

## 2024-10-04 VITALS
TEMPERATURE: 98 F | WEIGHT: 201 LBS | RESPIRATION RATE: 20 BRPM | BODY MASS INDEX: 28.77 KG/M2 | SYSTOLIC BLOOD PRESSURE: 126 MMHG | HEIGHT: 70 IN | HEART RATE: 60 BPM | OXYGEN SATURATION: 97 % | DIASTOLIC BLOOD PRESSURE: 83 MMHG

## 2024-10-04 DIAGNOSIS — R79.89 ABNORMAL TSH: ICD-10-CM

## 2024-10-04 DIAGNOSIS — R45.89 ANXIETY ABOUT HEALTH: ICD-10-CM

## 2024-10-04 DIAGNOSIS — E05.00 GRAVES DISEASE: ICD-10-CM

## 2024-10-04 DIAGNOSIS — I47.10 PAROXYSMAL SVT (SUPRAVENTRICULAR TACHYCARDIA): ICD-10-CM

## 2024-10-04 DIAGNOSIS — Z09 HOSPITAL DISCHARGE FOLLOW-UP: ICD-10-CM

## 2024-10-04 DIAGNOSIS — Z82.3 FAMILY HISTORY OF STROKE: ICD-10-CM

## 2024-10-04 DIAGNOSIS — Z79.01 CHRONIC ANTICOAGULATION: ICD-10-CM

## 2024-10-04 DIAGNOSIS — D50.0 BLOOD LOSS ANEMIA: ICD-10-CM

## 2024-10-04 DIAGNOSIS — N93.8 DYSFUNCTIONAL UTERINE BLEEDING: ICD-10-CM

## 2024-10-04 DIAGNOSIS — E66.3 OVERWEIGHT WITH BODY MASS INDEX (BMI) OF 28 TO 28.9 IN ADULT: ICD-10-CM

## 2024-10-04 DIAGNOSIS — D62 ANEMIA DUE TO ACUTE BLOOD LOSS: Primary | ICD-10-CM

## 2024-10-04 DIAGNOSIS — F17.200 TOBACCO DEPENDENCE: ICD-10-CM

## 2024-10-04 LAB
ANION GAP SERPL CALCULATED.3IONS-SCNC: 13 MMOL/L (ref 4–13)
AUTO MIXED CELLS #: 0.7 10*3/MM3 (ref 0.1–2.6)
AUTO MIXED CELLS %: 6.8 % (ref 0.1–24)
BUN SERPL-MCNC: 9 MG/DL (ref 5–21)
BUN/CREAT SERPL: 12.3
CALCIUM SPEC-SCNC: 8.9 MG/DL (ref 8.6–10.5)
CHLORIDE SERPL-SCNC: 105 MMOL/L (ref 98–110)
CO2 SERPL-SCNC: 22 MMOL/L (ref 24–31)
CREAT SERPL-MCNC: 0.73 MG/DL (ref 0.5–1.4)
EGFRCR SERPLBLD CKD-EPI 2021: 105.5 ML/MIN/1.73
ERYTHROCYTE [DISTWIDTH] IN BLOOD BY AUTOMATED COUNT: 18.3 % (ref 12.3–15.4)
GLUCOSE SERPL-MCNC: 114 MG/DL (ref 70–100)
HCT VFR BLD AUTO: 35.8 % (ref 34–46.6)
HGB BLD-MCNC: 10.7 G/DL (ref 12–15.9)
LYMPHOCYTES # BLD AUTO: 1.9 10*3/MM3 (ref 0.7–3.1)
LYMPHOCYTES NFR BLD AUTO: 19.1 % (ref 19.6–45.3)
MCH RBC QN AUTO: 27.7 PG (ref 26.6–33)
MCHC RBC AUTO-ENTMCNC: 29.9 G/DL (ref 31.5–35.7)
MCV RBC AUTO: 92.7 FL (ref 79–97)
NEUTROPHILS NFR BLD AUTO: 7.4 10*3/MM3 (ref 1.7–7)
NEUTROPHILS NFR BLD AUTO: 74.1 % (ref 42.7–76)
PLATELET # BLD AUTO: 307 10*3/MM3 (ref 140–450)
PMV BLD AUTO: 8 FL (ref 6–12)
POTASSIUM SERPL-SCNC: 4.3 MMOL/L (ref 3.5–5.3)
RBC # BLD AUTO: 3.86 10*6/MM3 (ref 3.77–5.28)
SODIUM SERPL-SCNC: 140 MMOL/L (ref 135–145)
TSH SERPL DL<=0.05 MIU/L-ACNC: 2.79 UIU/ML (ref 0.27–4.2)
WBC NRBC COR # BLD AUTO: 10 10*3/MM3 (ref 3.4–10.8)

## 2024-10-04 PROCEDURE — 80048 BASIC METABOLIC PNL TOTAL CA: CPT

## 2024-10-04 PROCEDURE — 36415 COLL VENOUS BLD VENIPUNCTURE: CPT

## 2024-10-04 PROCEDURE — 84443 ASSAY THYROID STIM HORMONE: CPT

## 2024-10-04 PROCEDURE — 85025 COMPLETE CBC W/AUTO DIFF WBC: CPT

## 2024-10-04 NOTE — PROGRESS NOTES
Transitional Care Follow Up Visit  Subjective     Denise Ahn is a 44 y.o. female who presents for a transitional care management visit.    Within 48 business hours after discharge our office contacted her via telephone to coordinate her care and needs.      I reviewed and discussed the details of that call along with the discharge summary, hospital problems, inpatient lab results, inpatient diagnostic studies, and consultation reports with Denise.     Current outpatient and discharge medications have been reconciled for the patient.  Reviewed by: MELLISSA Ching          9/29/2024    10:08 AM   Date of TCM Phone Call   [facility]   Date of Admission 9/27/2024   Date of Discharge 9/29/2024   Discharge Disposition Home or Self Care     Risk for Readmission (LACE) Score: 9 (9/28/2024  5:00 AM)      History of Present Illness  She is here for hospital follow up. Her anxiety is very high due to all that has happened but ativan is controlling it. She seen ob/gyn in [place] 10/2/24. She is to have a hysterectomy but is being referred to [place]. She has cut back a half pack on smoking. Says she is feeling much better overall.      Course During Hospital Stay:  Admitted 9/27 through 9/28 for acute blood loss anemia related to menorrhagia, dysfunctional uterine bleeding and on chronic anticoagulation for history of arterial occlusion and PSVT.  She presented to the emergency room with weakness dizziness and dyspnea with exertion.  On arrival her blood work revealed a hemoglobin of 5.9.  For comparison her hemoglobin a week prior to was 10.5.  She had been having heavy menstrual bleeding and had presented to the emergency department on September 21 however was stable at that time.  She had been discussing with her GYN an upcoming endometrial ablation.  However her chronic anticoagulation had complicated this matter.  Her Eliquis was stopped during hospitalization and she was transfused  with RBCs.  She was also given 2 days of iron therapy IV.  Posttransfusion hemoglobin improved to 7.9. and patient did feel better.  Dr. Perez did see her in the hospital and scheduled a follow-up for anticipated endometrial ablation at discharge.  Tobacco cessation was encouraged.  They recommended she follow-up with Dr. Turcios with vascular.  It was recommended that she follow-up outpatient with her gynecologist, her cardiologist, her vascular specialist and her PCP.     Vascular follow up notes:   10/1/2024 - Ms. Ahn is here to discuss coming off of Eliquis for an ablation of fibroids on her uterus. She was recently hospitalized due to heavy bleeding during her menstrual period causing a hemoglobin less than 6 requiring a transfusion. Of note she presents today with a area of induration just proximal to her right AC above the IV access sites. She states she had the transfusion four days ago but the pain and swelling to the extremity started last night. She was taken off her Eliquis during the hospitalization f3 days. We put an ultrasound on the area which revealed a thrombosed right cephalic vein proximal to the IV site. I recommended a Lovenox bridge for her procedure and she states she has an appointment with her OBGYN tomorrow and ask him if he is agreeable, if so I will send in some Lovenox for her. I advised her to keep the area of induration to her RUE wrapped and to stay on her Eliquis.   The following portions of the patient's history were reviewed and updated as appropriate: allergies, current medications, past family history, past medical history, past social history, past surgical history, and problem list.  ED to Hosp-Admission (Discharged) with Raman Elizalde MD; Curly Small MD; Vinita Hansen MD (09/27/2024)   CBC w AUTO Differential (10/04/2024 08:01)  Basic metabolic panel (10/04/2024 08:01)  CBC & Differential (09/27/2024 05:12)   Magnesium (09/27/2024 05:12)  TSH  "(09/27/2024 05:12)  Iron Profile (09/27/2024 05:12)  T4, Free (09/27/2024 05:12)  Review of Systems    Objective   /83   Pulse 60   Temp 98 °F (36.7 °C)   Resp 20   Ht 177.8 cm (70\")   Wt 91.2 kg (201 lb)   SpO2 97%   BMI 28.84 kg/m²   Physical Exam  Constitutional:       Appearance: She is well-developed and overweight.   HENT:      Head: Normocephalic and atraumatic.      Right Ear: Tympanic membrane, ear canal and external ear normal.      Left Ear: Tympanic membrane, ear canal and external ear normal.      Nose: Nose normal. No septal deviation, nasal tenderness or congestion.      Mouth/Throat:      Lips: Pink. No lesions.      Mouth: Mucous membranes are moist. No oral lesions.      Dentition: Normal dentition.      Pharynx: Oropharynx is clear. No pharyngeal swelling, oropharyngeal exudate or posterior oropharyngeal erythema.   Eyes:      General: Lids are normal. Vision grossly intact. No scleral icterus.        Right eye: No discharge.         Left eye: No discharge.      Extraocular Movements: Extraocular movements intact.      Conjunctiva/sclera: Conjunctivae normal.      Right eye: Right conjunctiva is not injected.      Left eye: Left conjunctiva is not injected.      Pupils: Pupils are equal, round, and reactive to light.   Neck:      Thyroid: No thyroid mass.      Trachea: Trachea normal.   Cardiovascular:      Rate and Rhythm: Normal rate and regular rhythm.      Heart sounds: Normal heart sounds. No murmur heard.     No gallop.   Pulmonary:      Effort: Pulmonary effort is normal.      Breath sounds: Normal breath sounds and air entry. No wheezing, rhonchi or rales.   Abdominal:      General: There is no distension.      Palpations: Abdomen is soft. There is no mass.      Tenderness: There is no abdominal tenderness. There is no right CVA tenderness, left CVA tenderness, guarding or rebound.   Musculoskeletal:         General: No tenderness or deformity. Normal range of motion.      " Cervical back: Full passive range of motion without pain, normal range of motion and neck supple.      Thoracic back: Normal.      Right lower leg: No edema.      Left lower leg: No edema.   Skin:     General: Skin is warm and dry.      Coloration: Skin is not jaundiced.      Findings: No rash.   Neurological:      Mental Status: She is alert and oriented to person, place, and time.      Sensory: Sensation is intact.      Motor: Motor function is intact.      Coordination: Coordination is intact.      Gait: Gait is intact.      Deep Tendon Reflexes: Reflexes are normal and symmetric.   Psychiatric:         Mood and Affect: Mood and affect normal. Mood is anxious.         Judgment: Judgment normal.         Assessment & Plan   Problems Addressed this Visit       Paroxysmal SVT (supraventricular tachycardia)    Graves disease    Anemia - Primary    Dysfunctional uterine bleeding     Other Visit Diagnoses       Chronic anticoagulation        Hospital discharge follow-up        Anxiety about health        Abnormal TSH        Relevant Orders    TSH    US Thyroid    Tobacco dependence        Family history of stroke        Overweight with body mass index (BMI) of 28 to 28.9 in adult              Diagnoses         Codes Comments    Anemia due to acute blood loss    -  Primary ICD-10-CM: D62  ICD-9-CM: 285.1     Dysfunctional uterine bleeding     ICD-10-CM: N93.8  ICD-9-CM: 626.8     Paroxysmal SVT (supraventricular tachycardia)     ICD-10-CM: I47.10  ICD-9-CM: 427.0     Chronic anticoagulation     ICD-10-CM: Z79.01  ICD-9-CM: V58.61     Hospital discharge follow-up     ICD-10-CM: Z09  ICD-9-CM: V67.59     Anxiety about health     ICD-10-CM: R45.89  ICD-9-CM: 799.29     Graves disease     ICD-10-CM: E05.00  ICD-9-CM: 242.00     Abnormal TSH     ICD-10-CM: R79.89  ICD-9-CM: 790.6     Tobacco dependence     ICD-10-CM: F17.200  ICD-9-CM: 305.1     Family history of stroke     ICD-10-CM: Z82.3  ICD-9-CM: V17.1     Overweight  with body mass index (BMI) of 28 to 28.9 in adult     ICD-10-CM: E66.3, Z68.28  ICD-9-CM: 278.02, V85.24         Plan:   Repeat labs today reviewed and discussed, improving H and H. She is feeling much better.   Keep scheduled follow up with GYN and cardiology. Will be seeing Dr Pires in [place], gyn/onc.   Reviewed vascular notes and lovenox regimen.   Anxiety-On Ativan prn for now and will be starting Pristiq this weekend she says.   Smoking cessation highly encouraged.   Abnormal TSH in hospital-history of Graves disease. Will repeat and get US. Prev followed with endocrinology.  Recommended starting statin , nightly crestor 5 mg as well today. She wishes to revisit this again in subsequent visits.

## 2024-10-07 ENCOUNTER — TELEPHONE (OUTPATIENT)
Dept: FAMILY MEDICINE CLINIC | Facility: CLINIC | Age: 44
End: 2024-10-07
Payer: COMMERCIAL

## 2024-10-07 NOTE — TELEPHONE ENCOUNTER
----- Message from Lela Boyle sent at 10/7/2024 12:27 PM CDT -----  Please let pt know results: tsh is wnl now.

## 2024-10-09 ENCOUNTER — READMISSION MANAGEMENT (OUTPATIENT)
Dept: CALL CENTER | Facility: HOSPITAL | Age: 44
End: 2024-10-09
Payer: COMMERCIAL

## 2024-10-09 NOTE — OUTREACH NOTE
Medical Week 2 Survey      Flowsheet Row Responses   [facility] patient discharged from? [place]   Does the patient have one of the following disease processes/diagnoses(primary or secondary)? Other   Week 2 attempt successful? Yes   Call start time 1001   Discharge diagnosis Symptomatic Anemia   Call end time 1005   Person spoke with today (if not patient) and relationship pt   Does the patient have a primary care provider?  Yes   Comments regarding PCP Has seen pcp 10/04   Comments Seen Gyn and they referred her to oncology GYN   Has home health visited the patient within 72 hours of discharge? N/A   Psychosocial issues? No   Did the patient receive a copy of their discharge instructions? Yes   Nursing interventions Reviewed instructions with patient   What is the patient's perception of their health status since discharge? Improving   Is the patient/caregiver able to teach back signs and symptoms related to disease process for when to call PCP? Yes   Is the patient/caregiver able to teach back signs and symptoms related to disease process for when to call 911? Yes   Is the patient/caregiver able to teach back the hierarchy of who to call/visit for symptoms/problems? PCP, Specialist, Home health nurse, Urgent Care, ED, 911 Yes   Week 2 Call Completed? Yes   Graduated Yes   Is the patient interested in additional calls from an ambulatory ? No   Would this patient benefit from a Referral to Amb Social Work? No   Wrap up additional comments Patient reports doing well. Levels have improved. No concerns or questions noted.   Call end time 1005            Karla TELLEZ - Registered Nurse

## 2024-10-15 ENCOUNTER — HOSPITAL ENCOUNTER (OUTPATIENT)
Dept: ULTRASOUND IMAGING | Facility: HOSPITAL | Age: 44
Discharge: HOME | End: 2024-10-15
Payer: COMMERCIAL

## 2024-10-15 ENCOUNTER — HOSPITAL ENCOUNTER (OUTPATIENT)
Dept: GENERAL RADIOLOGY | Facility: HOSPITAL | Age: 44
Discharge: HOME | End: 2024-10-15
Payer: COMMERCIAL

## 2024-10-15 ENCOUNTER — OFFICE VISIT (OUTPATIENT)
Dept: UROLOGY | Facility: CLINIC | Age: 44
End: 2024-10-15
Payer: COMMERCIAL

## 2024-10-15 VITALS — BODY MASS INDEX: 28.77 KG/M2 | TEMPERATURE: 97.6 F | WEIGHT: 201 LBS | HEIGHT: 70 IN

## 2024-10-15 DIAGNOSIS — N20.1 URETERAL STONE: ICD-10-CM

## 2024-10-15 DIAGNOSIS — N20.1 URETERAL STONE: Primary | ICD-10-CM

## 2024-10-15 PROCEDURE — 76536 US EXAM OF HEAD AND NECK: CPT

## 2024-10-15 PROCEDURE — 74018 RADEX ABDOMEN 1 VIEW: CPT

## 2024-10-16 ENCOUNTER — TELEPHONE (OUTPATIENT)
Dept: FAMILY MEDICINE CLINIC | Facility: CLINIC | Age: 44
End: 2024-10-16
Payer: COMMERCIAL

## 2024-10-16 NOTE — TELEPHONE ENCOUNTER
----- Message from Lela Boyle sent at 10/16/2024  3:02 PM CDT -----  Please let pt know results: stable thyroid US-two tiny non suspicious cysts only.

## 2024-10-21 DIAGNOSIS — F41.8 SITUATIONAL ANXIETY: ICD-10-CM

## 2024-10-22 ENCOUNTER — TELEPHONE (OUTPATIENT)
Dept: ONCOLOGY | Facility: CLINIC | Age: 44
End: 2024-10-22
Payer: COMMERCIAL

## 2024-10-22 NOTE — TELEPHONE ENCOUNTER
Call from Mark from [facility]. She reports that the patient will be having surgery there on 11/12/2024 and she will need a CBC check and a chair held for possible 2 units of blood on 11/05/24. She asked if that could be set up by Dr. Purvis .  I have sent a message to Dr. Purvis.

## 2024-10-22 NOTE — TELEPHONE ENCOUNTER
Discussed with Dr. Purvis and he is okay with her coming in on 11/05/2024 with a chair held. He would like the CBC checked first.

## 2024-10-22 NOTE — TELEPHONE ENCOUNTER
Rx Refill Note  Requested Prescriptions     Pending Prescriptions Disp Refills    LORazepam (Ativan) 0.5 MG tablet 20 tablet 0     Sig: Take 1 tablet by mouth Every 8 (Eight) Hours As Needed for Anxiety.      Last office visit with prescribing clinician: 10/4/2024   Last telemedicine visit with prescribing clinician: 10/2/2024   Next office visit with prescribing clinician: 11/5/2024

## 2024-10-23 RX ORDER — LORAZEPAM 0.5 MG/1
0.5 TABLET ORAL EVERY 8 HOURS PRN
Qty: 15 TABLET | Refills: 0 | Status: SHIPPED | OUTPATIENT
Start: 2024-10-23

## 2024-11-03 ENCOUNTER — NURSE TRIAGE (OUTPATIENT)
Dept: CALL CENTER | Facility: HOSPITAL | Age: 44
End: 2024-11-03
Payer: COMMERCIAL

## 2024-11-03 ENCOUNTER — HOSPITAL ENCOUNTER (EMERGENCY)
Facility: HOSPITAL | Age: 44
Discharge: HOME | End: 2024-11-03
Attending: EMERGENCY MEDICINE | Admitting: EMERGENCY MEDICINE
Payer: COMMERCIAL

## 2024-11-03 VITALS
HEART RATE: 62 BPM | SYSTOLIC BLOOD PRESSURE: 115 MMHG | RESPIRATION RATE: 16 BRPM | TEMPERATURE: 98.5 F | OXYGEN SATURATION: 100 % | DIASTOLIC BLOOD PRESSURE: 40 MMHG | BODY MASS INDEX: 28.63 KG/M2 | HEIGHT: 70 IN | WEIGHT: 200 LBS

## 2024-11-03 DIAGNOSIS — S46.912A STRAIN OF LEFT SHOULDER, INITIAL ENCOUNTER: ICD-10-CM

## 2024-11-03 DIAGNOSIS — M79.89 LOCALIZED SWELLING OF LOWER EXTREMITY: Primary | ICD-10-CM

## 2024-11-03 PROCEDURE — 99282 EMERGENCY DEPT VISIT SF MDM: CPT

## 2024-11-03 NOTE — ED PROVIDER NOTES
Subjective   History of Present Illness  44-year-old female presents to the ED due to concern for blood clot in her right leg.  She also complains of mild left shoulder pain.  She has a history of DVT and is on Eliquis, reports compliance with the medication.  Was involved with her sister's wedding yesterday.  Today woke up and noticed some mild swelling to the anterior medial aspect of her right mid shin.  Area of swelling described was approximately 4 x 4 cm.  Swelling resolved before she arrived to the emergency department.  Patient states she gets anxious about recurrent DVTs so came to the ED for evaluation.  Denies missed doses of Eliquis.  No shortness of breath or difficulty breathing.  No calf pain or swelling, no unilateral leg swelling.  She also complains of mild left shoulder pain.  Has had intermittent episodes of shoulder pain off and on for the past 10 years.  She is a former athlete.  For the past week she has had left shoulder pain that is worse with range of motion against resistance.  No shortness of breath, no chest pain, abdominal pain, nausea vomiting.    History provided by:  Patient      Review of Systems   All other systems reviewed and are negative.      Past Medical History:   Diagnosis Date    Anemia     Arterial occlusion, lower extremity     Fibroid uterus     Graves disease     Kidney stone 2024    PONV (postoperative nausea and vomiting)     SVT (supraventricular tachycardia)        Allergies   Allergen Reactions    Codeine Other (See Comments)     Unknown        Past Surgical History:   Procedure Laterality Date    APPENDECTOMY      ARTERIOGRAM LOWER EXTREMITY Left 2024    Procedure: ARTERIOGRAM LOWER EXTREMITY, SUCTION THROMBECTOMY LEFT POPLITEAL ARTERY, BALLOON ANGIOPLASTY LEFT POSTERIOR TIBIAL ARTERY;  Surgeon: Trever Turcios MD;  Location: [facility] HYBRID OR;  Service: Vascular;  Laterality: Left;     SECTION      ENDOSCOPY      EXTRACORPOREAL SHOCKWAVE  LITHOTRIPSY (ESWL), STENT INSERTION/REMOVAL Right 03/23/2022    Procedure: EXTRACORPOREAL SHOCKWAVE LITHOTRIPSY  RIGHT CYSTO STENT PLACEMENT;  Surgeon: Michele Chavez MD;  Location: [facility] OR;  Service: Urology;  Laterality: Right;    TUBAL ABDOMINAL LIGATION Bilateral        Family History   Problem Relation Age of Onset    Breast cancer Paternal Aunt        Social History     Socioeconomic History    Marital status:    Tobacco Use    Smoking status: Every Day     Current packs/day: 1.00     Average packs/day: 1 pack/day for 13.0 years (13.0 ttl pk-yrs)     Types: Cigarettes     Passive exposure: Current    Smokeless tobacco: Never   Vaping Use    Vaping status: Never Used   Substance and Sexual Activity    Alcohol use: Yes     Comment: recreational    Drug use: Never    Sexual activity: Defer     Partners: Male     Birth control/protection: Bilateral salpingectomy            Objective   Physical Exam  Vitals and nursing note reviewed.   Constitutional:       General: She is not in acute distress.     Appearance: Normal appearance.   Cardiovascular:      Rate and Rhythm: Normal rate and regular rhythm.      Heart sounds: Normal heart sounds. No murmur heard.  Pulmonary:      Effort: Pulmonary effort is normal.      Breath sounds: Normal breath sounds. No wheezing, rhonchi or rales.   Musculoskeletal:         General: Normal range of motion.      Right lower leg: No edema.      Left lower leg: No edema.      Comments: Full range of motion left shoulder.  Left shoulder tender to palpation anteriorly and posteriorly.  She has pain with range of motion against resistance.    No lower extremity edema   Skin:     General: Skin is warm and dry.      Findings: No bruising.   Neurological:      Mental Status: She is alert.         Procedures       Lab Results (last 24 hours)       ** No results found for the last 24 hours. **         No Radiology Exams Resulted Within Past 24 Hours     ED Course  ED Course as of  11/03/24 1456   Sun Nov 03, 2024   1415 44-year-old female with history of DVT on Eliquis presents to the ED due to transient localized swelling to the right lower leg.  She described an area of swelling about 4 x 4 cm to the anterior medial aspect of her right shin.  This is not in any area of a deep vein.  No diffuse right lower extremity edema.  Calf nontender.  She also mention left shoulder discomfort.  Has history of recurrent shoulder pain from sports injury as a kid.  Full range of motion, no red flags.  She has been compliant with Eliquis.  Exam and history not consistent with DVT.  Did not feel imaging warranted at this time.  Recommend she continue taking her medication as prescribed, can follow-up with her primary doctor as an outpatient if needed. [AW]      ED Course User Index  [AW] Silas Dudley MD                                               Medical Decision Making      Final diagnoses:   Localized swelling of lower extremity   Strain of left shoulder, initial encounter       ED Disposition  ED Disposition       ED Disposition   Discharge    Condition   Stable    Comment   --               Lela Boyle, APRN  [address] [facility]  [address] [place] [id number]  [phone]    Schedule an appointment as soon as possible for a visit   As needed         Medication List      No changes were made to your prescriptions during this visit.            Silas Dudley MD  11/03/24 1456     Initial (On Arrival)

## 2024-11-03 NOTE — TELEPHONE ENCOUNTER
"Patient has new lump on right shin, just noticed this morning. Denies pain, swelling, heat, discoloration. Has hx of DVT. Also mentions some chest discomfort and cough. Reviewed protocol with patient. Advised to go to ER for evaluation. Patient verbalizes agreement with plan.    Reason for Disposition   Patient sounds very sick or weak to the triager    Additional Information   Negative: Sounds like a life-threatening emergency to the triager   Negative: Small growth, spot, bump, or pigmented area of skin (e.g., moles, skin tags, wart, melanoma, skin cancer)   Negative: Inguinal hernia previously diagnosed by a doctor (or NP/PA)   Negative: Followed a skin injury   Negative: Followed an insect bite   Negative: Swelling of entire face   Negative: Swelling of eye   Negative: Swelling of labia   Negative: Swelling of lip   Negative: Swelling of lymph node suspected   Negative: Swelling of rectum   Negative: Swelling of scrotum   Negative: Swelling of surgical incision   Negative: Swelling of tongue   Negative: Swelling of vaccination site   Negative: Swelling of ankle joint   Negative: Swelling of elbow joint   Negative: Swelling of knee joint   Negative: Swelling with a skin rash    Answer Assessment - Initial Assessment Questions  1. APPEARANCE of SWELLING: \"What does it look like?\"      Lump on right shin  2. SIZE: \"How large is the swelling?\" (e.g., inches, cm; or compare to size of pinhead, tip of pen, eraser, coin, pea, grape, ping pong ball)       2 inches long, 1 inch wide  3. LOCATION: \"Where is the swelling located?\"      Right shin  4. ONSET: \"When did the swelling start?\"      This morning  5. COLOR: \"What color is it?\" \"Is there more than one color?\"      No discoloration  6. PAIN: \"Is there any pain?\" If Yes, ask: \"How bad is the pain?\" (e.g., scale 1-10; or mild, moderate, severe)      - NONE (0): no pain    - MILD (1-3): doesn't interfere with normal activities     - MODERATE (4-7): interferes with " "normal activities or awakens from sleep     - SEVERE (8-10): excruciating pain, unable to do any normal activities      No pain  7. ITCH: \"Does it itch?\" If Yes, ask: \"How bad is the itch?\"       No   8. CAUSE: \"What do you think caused the swelling?\"  On feet all day yesterday   9 OTHER SYMPTOMS: \"Do you have any other symptoms?\" (e.g., fever)      Chest discomfort/cough    Protocols used: Skin Lump or Localized Swelling-ADULT-AH    "

## 2024-11-04 ENCOUNTER — LAB (OUTPATIENT)
Dept: LAB | Facility: HOSPITAL | Age: 44
End: 2024-11-04
Payer: COMMERCIAL

## 2024-11-04 ENCOUNTER — HOSPITAL ENCOUNTER (OUTPATIENT)
Dept: CT IMAGING | Facility: HOSPITAL | Age: 44
Discharge: HOME | End: 2024-11-04
Payer: COMMERCIAL

## 2024-11-04 ENCOUNTER — OFFICE VISIT (OUTPATIENT)
Dept: FAMILY MEDICINE CLINIC | Facility: CLINIC | Age: 44
End: 2024-11-04
Payer: COMMERCIAL

## 2024-11-04 VITALS
SYSTOLIC BLOOD PRESSURE: 105 MMHG | DIASTOLIC BLOOD PRESSURE: 69 MMHG | BODY MASS INDEX: 28.2 KG/M2 | HEIGHT: 70 IN | TEMPERATURE: 99.1 F | HEART RATE: 70 BPM | WEIGHT: 197 LBS | OXYGEN SATURATION: 97 %

## 2024-11-04 DIAGNOSIS — R05.1 ACUTE COUGH: ICD-10-CM

## 2024-11-04 DIAGNOSIS — R50.9 FEVER, UNSPECIFIED FEVER CAUSE: ICD-10-CM

## 2024-11-04 DIAGNOSIS — I47.10 PAROXYSMAL SVT (SUPRAVENTRICULAR TACHYCARDIA): ICD-10-CM

## 2024-11-04 DIAGNOSIS — Z86.79 HISTORY OF ARTERIAL OCCLUSION: ICD-10-CM

## 2024-11-04 DIAGNOSIS — U07.1 COVID-19 VIRUS INFECTION: Primary | ICD-10-CM

## 2024-11-04 DIAGNOSIS — M25.512 ACUTE PAIN OF LEFT SHOULDER: ICD-10-CM

## 2024-11-04 LAB
ALBUMIN SERPL-MCNC: 4.2 G/DL (ref 3.5–5)
ALBUMIN/GLOB SERPL: 1.6 G/DL (ref 1.1–2.5)
ALP SERPL-CCNC: 62 U/L (ref 24–120)
ALT SERPL W P-5'-P-CCNC: 22 U/L (ref 0–35)
ANION GAP SERPL CALCULATED.3IONS-SCNC: 7 MMOL/L (ref 4–13)
AST SERPL-CCNC: 18 U/L (ref 7–45)
AUTO MIXED CELLS #: 0.6 10*3/MM3 (ref 0.1–2.6)
AUTO MIXED CELLS %: 12 % (ref 0.1–24)
BILIRUB SERPL-MCNC: 0.2 MG/DL (ref 0.1–1)
BUN SERPL-MCNC: 6 MG/DL (ref 5–21)
BUN/CREAT SERPL: 7.5
CALCIUM SPEC-SCNC: 8.8 MG/DL (ref 8.6–10.5)
CHLORIDE SERPL-SCNC: 104 MMOL/L (ref 98–110)
CO2 SERPL-SCNC: 26 MMOL/L (ref 24–31)
CREAT SERPL-MCNC: 0.8 MG/DL (ref 0.5–1.4)
CRP SERPL-MCNC: 0.88 MG/DL (ref 0–0.5)
EGFRCR SERPLBLD CKD-EPI 2021: 94.5 ML/MIN/1.73
ERYTHROCYTE [DISTWIDTH] IN BLOOD BY AUTOMATED COUNT: 14.9 % (ref 12.3–15.4)
FLUAV AG NPH QL: NEGATIVE
FLUBV AG NPH QL IA: NEGATIVE
GLOBULIN UR ELPH-MCNC: 2.7 GM/DL
GLUCOSE SERPL-MCNC: 108 MG/DL (ref 70–100)
HCT VFR BLD AUTO: 32.8 % (ref 34–46.6)
HGB BLD-MCNC: 9.6 G/DL (ref 12–15.9)
LYMPHOCYTES # BLD AUTO: 0.7 10*3/MM3 (ref 0.7–3.1)
LYMPHOCYTES NFR BLD AUTO: 13.9 % (ref 19.6–45.3)
MCH RBC QN AUTO: 26.9 PG (ref 26.6–33)
MCHC RBC AUTO-ENTMCNC: 29.3 G/DL (ref 31.5–35.7)
MCV RBC AUTO: 91.9 FL (ref 79–97)
NEUTROPHILS NFR BLD AUTO: 3.5 10*3/MM3 (ref 1.7–7)
NEUTROPHILS NFR BLD AUTO: 74.1 % (ref 42.7–76)
PLATELET # BLD AUTO: 214 10*3/MM3 (ref 140–450)
PMV BLD AUTO: 9.2 FL (ref 6–12)
POTASSIUM SERPL-SCNC: 4.1 MMOL/L (ref 3.5–5.3)
PROCALCITONIN SERPL-MCNC: 0.09 NG/ML (ref 0–0.25)
PROT SERPL-MCNC: 6.9 G/DL (ref 6.3–8.7)
RBC # BLD AUTO: 3.57 10*6/MM3 (ref 3.77–5.28)
SARS-COV-2 RDRP RESP QL NAA+PROBE: DETECTED
SODIUM SERPL-SCNC: 137 MMOL/L (ref 135–145)
WBC NRBC COR # BLD AUTO: 4.8 10*3/MM3 (ref 3.4–10.8)

## 2024-11-04 PROCEDURE — 87635 SARS-COV-2 COVID-19 AMP PRB: CPT

## 2024-11-04 PROCEDURE — 36415 COLL VENOUS BLD VENIPUNCTURE: CPT

## 2024-11-04 PROCEDURE — 85025 COMPLETE CBC W/AUTO DIFF WBC: CPT

## 2024-11-04 PROCEDURE — 96372 THER/PROPH/DIAG INJ SC/IM: CPT | Performed by: NURSE PRACTITIONER

## 2024-11-04 PROCEDURE — 84145 PROCALCITONIN (PCT): CPT

## 2024-11-04 PROCEDURE — 93000 ELECTROCARDIOGRAM COMPLETE: CPT | Performed by: NURSE PRACTITIONER

## 2024-11-04 PROCEDURE — 86140 C-REACTIVE PROTEIN: CPT

## 2024-11-04 PROCEDURE — 25510000001 IOPAMIDOL PER 1 ML: Performed by: NURSE PRACTITIONER

## 2024-11-04 PROCEDURE — 80053 COMPREHEN METABOLIC PANEL: CPT

## 2024-11-04 PROCEDURE — 87804 INFLUENZA ASSAY W/OPTIC: CPT

## 2024-11-04 PROCEDURE — 99214 OFFICE O/P EST MOD 30 MIN: CPT | Performed by: NURSE PRACTITIONER

## 2024-11-04 PROCEDURE — 71275 CT ANGIOGRAPHY CHEST: CPT

## 2024-11-04 RX ORDER — ACETAMINOPHEN 325 MG/1
650 TABLET ORAL
COMMUNITY

## 2024-11-04 RX ORDER — DEXAMETHASONE SODIUM PHOSPHATE 4 MG/ML
6 INJECTION, SOLUTION INTRA-ARTICULAR; INTRALESIONAL; INTRAMUSCULAR; INTRAVENOUS; SOFT TISSUE ONCE
Status: COMPLETED | OUTPATIENT
Start: 2024-11-04 | End: 2024-11-04

## 2024-11-04 RX ORDER — HYDROCODONE BITARTRATE AND ACETAMINOPHEN 5; 325 MG/1; MG/1
TABLET ORAL
COMMUNITY
Start: 2024-10-21 | End: 2024-11-04

## 2024-11-04 RX ORDER — ENOXAPARIN SODIUM 100 MG/ML
6 INJECTION SUBCUTANEOUS EVERY 12 HOURS
COMMUNITY
Start: 2024-10-28 | End: 2024-11-04

## 2024-11-04 RX ORDER — IOPAMIDOL 755 MG/ML
100 INJECTION, SOLUTION INTRAVASCULAR
Status: COMPLETED | OUTPATIENT
Start: 2024-11-04 | End: 2024-11-04

## 2024-11-04 RX ADMIN — DEXAMETHASONE SODIUM PHOSPHATE 6 MG: 4 INJECTION, SOLUTION INTRA-ARTICULAR; INTRALESIONAL; INTRAMUSCULAR; INTRAVENOUS; SOFT TISSUE at 11:18

## 2024-11-04 RX ADMIN — IOPAMIDOL 100 ML: 755 INJECTION, SOLUTION INTRAVENOUS at 10:02

## 2024-11-04 NOTE — PROGRESS NOTES
"Chief Complaint  Fever, Cough, Sore Throat, and Shoulder Pain (Left)    Subjective    History of Present Illness      Patient presents to [facility] for   History of Present Illness  Patient says last week she started noticing a sore throat that she thought was allergy symptoms related and took some over-the-counter Allegra with no relief.  She felt fatigued during the weekend but related this to a wedding she was in.  She began having some right lower leg swelling around her shin area and then developed some shoulder pain so went to the ER.  Was discharged home with no results or relief.  She states that her's fever spiked yesterday up to 103 and because of her anticoagulation therapy she was only able to take Tylenol which would keep it around to 101.  She also says that the shoulder pain is significant and unrelieved.  The localized right lower leg swelling has completely resolved.  She mentions that she may have forgotten to take her blood thinner during the weekend because she was so busy with the wedding.  Denies any major cough.  Denies shortness of breath.       Review of Systems    I have reviewed and agree with the HPI and ROS information as above.  Lela Boyle, MELLISSA     Objective   Vital Signs:   /69   Pulse 70   Temp 99.1 °F (37.3 °C) (Oral)   Ht 177.8 cm (70\")   Wt 89.4 kg (197 lb)   SpO2 97%   BMI 28.27 kg/m²            Physical Exam  Constitutional:       Appearance: She is well-developed.      Comments: Does not appear to feel well, in pajamas, lying on exam bed   HENT:      Head: Normocephalic and atraumatic.      Right Ear: Tympanic membrane, ear canal and external ear normal.      Left Ear: Tympanic membrane, ear canal and external ear normal.      Nose: Nose normal. No septal deviation, nasal tenderness or congestion.      Mouth/Throat:      Lips: Pink. No lesions.      Mouth: Mucous membranes are moist. No oral lesions.      Dentition: Normal " dentition.      Pharynx: Oropharynx is clear. No pharyngeal swelling, oropharyngeal exudate or posterior oropharyngeal erythema.   Eyes:      General: Lids are normal. Vision grossly intact. No scleral icterus.        Right eye: No discharge.         Left eye: No discharge.      Extraocular Movements: Extraocular movements intact.      Conjunctiva/sclera: Conjunctivae normal.      Right eye: Right conjunctiva is not injected.      Left eye: Left conjunctiva is not injected.      Pupils: Pupils are equal, round, and reactive to light.   Neck:      Thyroid: No thyroid mass.      Trachea: Trachea normal.   Cardiovascular:      Rate and Rhythm: Normal rate and regular rhythm.      Heart sounds: Normal heart sounds. No murmur heard.     No gallop.   Pulmonary:      Effort: Pulmonary effort is normal.      Breath sounds: Normal air entry. Examination of the left-upper field reveals wheezing. Examination of the left-middle field reveals decreased breath sounds. Examination of the left-lower field reveals decreased breath sounds. Decreased breath sounds and wheezing present. No rhonchi or rales.   Abdominal:      General: There is no distension.      Palpations: Abdomen is soft. There is no mass.      Tenderness: There is no abdominal tenderness. There is no right CVA tenderness, left CVA tenderness, guarding or rebound.   Musculoskeletal:         General: No tenderness or deformity. Normal range of motion.      Cervical back: Full passive range of motion without pain, normal range of motion and neck supple.      Thoracic back: Normal.      Right lower leg: No edema.      Left lower leg: No edema.   Skin:     Coloration: Skin is not jaundiced.      Findings: No rash.      Comments: Clammy, diaphoretic    Neurological:      Mental Status: She is alert and oriented to person, place, and time.      Sensory: Sensation is intact.      Motor: Motor function is intact.      Coordination: Coordination is intact.      Gait: Gait is  intact.      Deep Tendon Reflexes: Reflexes are normal and symmetric.   Psychiatric:         Mood and Affect: Mood and affect normal.         Judgment: Judgment normal.              Result Review  Data Reviewed:          CT Angiogram Chest (11/04/2024 10:02)   Comprehensive metabolic panel (11/04/2024 09:55)  COVID-19, ABBOTT IN-HOUSE,NASAL Swab (NO TRANSPORT MEDIA) 2 HR TAT - Swab, Nasopharynx (11/04/2024 09:27)  Influenza Antigen, Rapid - Swab, Nasopharynx (11/04/2024 09:27)  CBC w AUTO Differential (11/04/2024 09:55)    ECG 12 Lead    Date/Time: 11/4/2024 2:53 PM  Performed by: Lela Boyle APRN    Authorized by: Lela Boyle APRN  Comparison: compared with previous ECG from 9/27/2024  Similar to previous ECG  Comparison to previous ECG: NSR HR 64  Rhythm: sinus rhythm  Rate: normal  BPM: 60    Clinical impression: normal ECG            Assessment and Plan      Diagnoses and all orders for this visit:    1. COVID-19 virus infection (Primary)  -     dexAMETHasone (DECADRON) injection 6 mg    2. Fever, unspecified fever cause  -     CT Angiogram Chest With Contrast; Future  -     CBC w AUTO Differential; Future  -     Comprehensive metabolic panel; Future  -     Procalcitonin; Future  -     C-reactive protein; Future  -     Influenza Antigen, Rapid - Swab, Nasopharynx; Future  -     COVID-19, ABBOTT IN-HOUSE,NASAL Swab (NO TRANSPORT MEDIA) 2 HR TAT - Swab, Nasopharynx; Future    3. Acute cough  -     CT Angiogram Chest With Contrast; Future  -     CBC w AUTO Differential; Future  -     Comprehensive metabolic panel; Future  -     Procalcitonin; Future  -     C-reactive protein; Future  -     Influenza Antigen, Rapid - Swab, Nasopharynx; Future  -     COVID-19, ABBOTT IN-HOUSE,NASAL Swab (NO TRANSPORT MEDIA) 2 HR TAT - Swab, Nasopharynx; Future    4. Acute pain of left shoulder  -     CT Angiogram Chest With Contrast; Future  -     CBC w AUTO Differential; Future  -     Comprehensive metabolic panel;  "Future  -     Procalcitonin; Future  -     C-reactive protein; Future  -     Influenza Antigen, Rapid - Swab, Nasopharynx; Future  -     COVID-19, ABBOTT IN-HOUSE,NASAL Swab (NO TRANSPORT MEDIA) 2 HR TAT - Swab, Nasopharynx; Future  -     dexAMETHasone (DECADRON) injection 6 mg  -     ECG 12 Lead    5. History of arterial occlusion  -     CT Angiogram Chest With Contrast; Future  -     CBC w AUTO Differential; Future  -     Comprehensive metabolic panel; Future  -     Procalcitonin; Future  -     C-reactive protein; Future  -     Influenza Antigen, Rapid - Swab, Nasopharynx; Future  -     COVID-19, ABBOTT IN-HOUSE,NASAL Swab (NO TRANSPORT MEDIA) 2 HR TAT - Swab, Nasopharynx; Future    6. Paroxysmal SVT (supraventricular tachycardia)  Comments:  Monitor HR closely abhilash with steroid IM today. She VU.    Plan:   CTA chest, labs to include cbc, cmp, crp, procalcitonin and rapid flu and covid testing today: CTA chest shows \"no evidence of pulmonary embolus.  Lungs are clear \".  CBC showed a stable white count and her hemoglobin has dropped slightly but not overly concerning as this is an established problem being addressed.  CMP is satisfactory, rapid flu testing negative and her rapid COVID testing was positive.  COVID-19 infection-counseled, quarantine discussed.  Continue Tylenol as needed and will give a low-dose steroid IM today.  She was counseled to monitor her heart rate closely on this.  EKG stable with normal sinus rhythm and a heart rate of 60 today.        Follow Up   Return if symptoms worsen or fail to improve.  Patient was given instructions and counseling regarding her condition or for health maintenance advice. Please see specific information pulled into the AVS if appropriate.       "

## 2024-11-05 NOTE — PROGRESS NOTES
Please let pt know results: procalcitonin not elevated and crp only minimally elevated. Let me know how she is doing today, thanks.

## 2024-11-06 ENCOUNTER — TELEPHONE (OUTPATIENT)
Dept: FAMILY MEDICINE CLINIC | Facility: CLINIC | Age: 44
End: 2024-11-06
Payer: COMMERCIAL

## 2024-11-19 DIAGNOSIS — F41.8 SITUATIONAL ANXIETY: ICD-10-CM

## 2024-11-19 RX ORDER — LORAZEPAM 0.5 MG/1
0.5 TABLET ORAL EVERY 8 HOURS PRN
Qty: 15 TABLET | Refills: 0 | Status: SHIPPED | OUTPATIENT
Start: 2024-11-19

## 2024-12-02 NOTE — PROGRESS NOTES
Clinic Progress Note    Patient:  Denise Ahn  YOB: 1980  Date of Service: 12/6/2024  MRN: [id number]   Acct:    Primary Care Physician: Lela Boyle APRN    Chief Complaint: Arterial clot, anemia    Hematology History:  Ms. Ahn is a 44 y.o. female with a past medical history of Graves' disease, SVT, recent arterial thromboembolic disease, who presents for the evaluation of the latter, as well as anemia.    The patient was hospitalized on 5/7/2024 for left leg pain, where there was a concern for arterial occlusion, with CTA imaging showing ischemic left lower extremity secondary to embolism to the left popliteal, anterior tibial, tibioperoneal trunk, peroneal posterior tibial arteries.  She was evaluated by vascular surgery, Dr. Turcios, and underwent ultrasound-guided cannulation of the right common femoral artery, suction embolectomy/thrombectomy of the left popliteal anterior tibial, tibioperoneal trunk, peroneal and proximal posterior tibial artery with embolectomy device. She also had suction embolectomy/thrombectomy of the left lateral plantar artery.  She was discharged on Eliquis.     Embolic workup with echocardiography showed EF of 61-65% otherwise had normal characteristics; left and right atrial cavity mildly dilated, and left ventricular concentric hypertrophy; telemetry showed no signs of A-fib.  Holter monitor is being planned.     It is of note that the patient has history of menorrhagia, for which she follows with OB/GYN, and does have history of anemia and reported iron deficiency.    Interval History:  Ms. Ahn is here for her scheduled follow-up.  In the interim from last visit, she has been doing fairly well.  She continues to develop recurrent anemia iron deficiency for which she has been receiving parenteral iron infusion, she is planned to undergo hysterectomy later this month.    Objectives:  Vitals:    12/06/24 1143   BP: 120/70   Pulse: 65   Resp: 18   Temp: 98  °F (36.7 °C)   SpO2: 97%     GENERAL: Alert and oriented, no apparent distress  HEENT: No scleral icterus  NECK: Supple  SKIN: No jaundice  PSYCHIATRIC: Full affect    Results:  Lab Results   Component Value Date    WBC 10.75 12/06/2024    HGB 10.9 (L) 12/06/2024    HCT 37.7 12/06/2024    MCV 84.9 12/06/2024     12/06/2024     Lab Results   Component Value Date    IRON 24 (L) 12/06/2024    LABIRON 5 (L) 12/06/2024    TRANSFERRIN 327 12/06/2024    TIBC 487 12/06/2024    FERRITIN 6.33 (L) 12/06/2024     Assessment :  Ms. Ahn is a 44 y.o. female with a past medical history of Graves' disease, SVT, recent arterial thromboembolic disease, who presents for the evaluation of the latter, as well as anemia.    Plan:   # Anemia:  -The patient's labs 6/3/2022 have showed hemoglobin within normal range, up until since 5/7/2024 which showed hemoglobin ranging 8.4-9.1, which coincide with her starting on anticoagulation; this could be related to her history of menorrhagia, that could be exacerbated by the fact that she started anticoagulation.  - Today, her repeat iron studies are consistent with iron deficiency, therefore we will proceed with parenteral iron infusion.  - Obtain CBC and iron studies in 6 months.  - RTC in 6 months.     # PAD - lower extremity arterial occlusion:  - She is s/p LLE arteriogram with thrombectomy to the left popliteal, KERRY, peroneal trunk and PTA; she has history of chronic tobacco use.  - She underwent appropriate thrombophilia workup on 05/08/2024 when she was hospitalized, including factor V Leiden, prothrombin gene mutation, protein S, C and Antithrombin III activity level, lupus anticoagulant and anticardiolipin antibodies, which all came back normal; beta-2 glycoprotein antibodies  done on 05/31/2024 came back unremarkable.  - As there is suspicion for embolic disease, she follows with cardiology.  - At this point, as her thrombus disease is arterial rather than venous, I would defer  management of anticoagulation to vascular surgery and cardiology; cardiology is recommending ongoing anticoagulation.    Mahsa Purvis MD  12/6/2024

## 2024-12-05 ENCOUNTER — TELEPHONE (OUTPATIENT)
Dept: ONCOLOGY | Facility: CLINIC | Age: 44
End: 2024-12-05

## 2024-12-05 NOTE — TELEPHONE ENCOUNTER
Caller: Denise Ahn    Relationship to patient: Self    Best call back number: [phone]    Type of visit: LAB, FU    Requested date: EITHER LATER IN THE DAY OR A DIFFERENT DAY     If rescheduling, when is the original appointment: 12/6     Additional notes:PT HAS A CONFLICT

## 2024-12-06 ENCOUNTER — LAB (OUTPATIENT)
Dept: LAB | Facility: HOSPITAL | Age: 44
End: 2024-12-06
Payer: COMMERCIAL

## 2024-12-06 ENCOUNTER — OFFICE VISIT (OUTPATIENT)
Dept: ONCOLOGY | Facility: CLINIC | Age: 44
End: 2024-12-06
Payer: COMMERCIAL

## 2024-12-06 VITALS
OXYGEN SATURATION: 97 % | RESPIRATION RATE: 18 BRPM | SYSTOLIC BLOOD PRESSURE: 120 MMHG | HEART RATE: 65 BPM | TEMPERATURE: 98 F | HEIGHT: 70 IN | DIASTOLIC BLOOD PRESSURE: 70 MMHG | WEIGHT: 292.7 LBS | BODY MASS INDEX: 41.9 KG/M2

## 2024-12-06 DIAGNOSIS — D64.9 ANEMIA, UNSPECIFIED TYPE: ICD-10-CM

## 2024-12-06 DIAGNOSIS — D50.9 IRON DEFICIENCY ANEMIA, UNSPECIFIED IRON DEFICIENCY ANEMIA TYPE: Primary | ICD-10-CM

## 2024-12-06 DIAGNOSIS — D50.9 IRON DEFICIENCY ANEMIA, UNSPECIFIED IRON DEFICIENCY ANEMIA TYPE: ICD-10-CM

## 2024-12-06 DIAGNOSIS — I77.9 PAOD (PERIPHERAL ARTERIAL OCCLUSIVE DISEASE): ICD-10-CM

## 2024-12-06 LAB
BASOPHILS # BLD AUTO: 0.05 10*3/MM3 (ref 0–0.2)
BASOPHILS NFR BLD AUTO: 0.5 % (ref 0–1.5)
DEPRECATED RDW RBC AUTO: 46.3 FL (ref 37–54)
EOSINOPHIL # BLD AUTO: 0.15 10*3/MM3 (ref 0–0.4)
EOSINOPHIL NFR BLD AUTO: 1.4 % (ref 0.3–6.2)
ERYTHROCYTE [DISTWIDTH] IN BLOOD BY AUTOMATED COUNT: 14.9 % (ref 12.3–15.4)
FERRITIN SERPL-MCNC: 6.33 NG/ML (ref 13–150)
FOLATE SERPL-MCNC: 7.95 NG/ML (ref 4.78–24.2)
HCT VFR BLD AUTO: 37.7 % (ref 34–46.6)
HGB BLD-MCNC: 10.9 G/DL (ref 12–15.9)
IMM GRANULOCYTES # BLD AUTO: 0.07 10*3/MM3 (ref 0–0.05)
IMM GRANULOCYTES NFR BLD AUTO: 0.7 % (ref 0–0.5)
IRON 24H UR-MRATE: 24 MCG/DL (ref 37–145)
IRON SATN MFR SERPL: 5 % (ref 20–50)
LYMPHOCYTES # BLD AUTO: 2.32 10*3/MM3 (ref 0.7–3.1)
LYMPHOCYTES NFR BLD AUTO: 21.6 % (ref 19.6–45.3)
MCH RBC QN AUTO: 24.5 PG (ref 26.6–33)
MCHC RBC AUTO-ENTMCNC: 28.9 G/DL (ref 31.5–35.7)
MCV RBC AUTO: 84.9 FL (ref 79–97)
MONOCYTES # BLD AUTO: 0.55 10*3/MM3 (ref 0.1–0.9)
MONOCYTES NFR BLD AUTO: 5.1 % (ref 5–12)
NEUTROPHILS NFR BLD AUTO: 7.61 10*3/MM3 (ref 1.7–7)
NEUTROPHILS NFR BLD AUTO: 70.7 % (ref 42.7–76)
NRBC BLD AUTO-RTO: 0 /100 WBC (ref 0–0.2)
PLATELET # BLD AUTO: 300 10*3/MM3 (ref 140–450)
PMV BLD AUTO: 9.5 FL (ref 6–12)
RBC # BLD AUTO: 4.44 10*6/MM3 (ref 3.77–5.28)
TIBC SERPL-MCNC: 487 MCG/DL (ref 298–536)
TRANSFERRIN SERPL-MCNC: 327 MG/DL (ref 200–360)
VIT B12 BLD-MCNC: 374 PG/ML (ref 211–946)
WBC NRBC COR # BLD AUTO: 10.75 10*3/MM3 (ref 3.4–10.8)

## 2024-12-06 PROCEDURE — 85025 COMPLETE CBC W/AUTO DIFF WBC: CPT

## 2024-12-06 PROCEDURE — 83540 ASSAY OF IRON: CPT

## 2024-12-06 PROCEDURE — 84466 ASSAY OF TRANSFERRIN: CPT

## 2024-12-06 PROCEDURE — 82607 VITAMIN B-12: CPT

## 2024-12-06 PROCEDURE — 36415 COLL VENOUS BLD VENIPUNCTURE: CPT

## 2024-12-06 PROCEDURE — 99214 OFFICE O/P EST MOD 30 MIN: CPT | Performed by: INTERNAL MEDICINE

## 2024-12-06 PROCEDURE — 82728 ASSAY OF FERRITIN: CPT

## 2024-12-06 PROCEDURE — 82746 ASSAY OF FOLIC ACID SERUM: CPT

## 2024-12-31 NOTE — TELEPHONE ENCOUNTER
----- Message from Lela Boyle sent at 11/5/2024  7:44 AM CST -----  Please let pt know results: procalcitonin not elevated and crp only minimally elevated. Let me know how she is doing today, thanks.   
Called patient and confirmed date of birth. Relayed results/recommendations. Pt verbally understood all, no questions at this time. She states she has not had a fever since Tuesday am. Her ribs hurt from coughing but otherwise she feels better.  
BIBEMS c/o neck and upper back pain. EMS reports that the pt called 911 stating that someone is trying to poison him. Pt states "it just happens because live alone." At baseline as per EMS, calm and cooperative in triage.

## 2025-01-09 DIAGNOSIS — I47.10 SVT (SUPRAVENTRICULAR TACHYCARDIA): ICD-10-CM

## 2025-01-09 RX ORDER — ATENOLOL 25 MG/1
25 TABLET ORAL 2 TIMES DAILY
Qty: 180 TABLET | Refills: 0 | Status: SHIPPED | OUTPATIENT
Start: 2025-01-09

## 2025-02-18 ENCOUNTER — HOSPITAL ENCOUNTER (OUTPATIENT)
Dept: GENERAL RADIOLOGY | Facility: HOSPITAL | Age: 45
Discharge: HOME | End: 2025-02-18
Payer: COMMERCIAL

## 2025-02-18 ENCOUNTER — OFFICE VISIT (OUTPATIENT)
Dept: UROLOGY | Facility: CLINIC | Age: 45
End: 2025-02-18
Payer: COMMERCIAL

## 2025-02-18 VITALS — WEIGHT: 292.11 LBS | BODY MASS INDEX: 41.82 KG/M2 | HEIGHT: 70 IN | TEMPERATURE: 97.5 F

## 2025-02-18 DIAGNOSIS — Z87.442 HISTORY OF KIDNEY STONES: Primary | ICD-10-CM

## 2025-02-18 DIAGNOSIS — R10.9 FLANK PAIN: ICD-10-CM

## 2025-02-18 DIAGNOSIS — Z87.442 HISTORY OF KIDNEY STONES: ICD-10-CM

## 2025-02-18 LAB
BILIRUB BLD-MCNC: NEGATIVE MG/DL
CLARITY, POC: CLEAR
COLOR UR: YELLOW
GLUCOSE UR STRIP-MCNC: NEGATIVE MG/DL
KETONES UR QL: NEGATIVE
LEUKOCYTE EST, POC: ABNORMAL
NITRITE UR-MCNC: NEGATIVE MG/ML
PH UR: 5.5 [PH] (ref 5–8)
PROT UR STRIP-MCNC: NEGATIVE MG/DL
RBC # UR STRIP: ABNORMAL /UL
SP GR UR: 1.01 (ref 1–1.03)
UROBILINOGEN UR QL: ABNORMAL

## 2025-02-18 PROCEDURE — 87086 URINE CULTURE/COLONY COUNT: CPT

## 2025-02-18 PROCEDURE — 74018 RADEX ABDOMEN 1 VIEW: CPT

## 2025-02-18 NOTE — PROGRESS NOTES
Subjective    Ms. Ahn is 44 y.o. female    Chief Complaint: left Flank pain     History of Present Illness    74-year-old female established patient in with new complaint of left-sided flank pain that started yesterday radiating into the left lower quadrant and suprapubic region.  History of kidney stones last with spontaneous passage of a 4 mm left distal ureteral stone.  Stone sent for tissue pathology revealing calcium oxalate.  Since last visit underwent hysterectomy in [place] 12/2024.    Last underwent surgical intervention due to kidney stones right ESWL with stent placement 3/23/2022 Dr. Chavez    The following portions of the patient's history were reviewed and updated as appropriate: allergies, current medications, past family history, past medical history, past social history, past surgical history and problem list.    Review of Systems   Constitutional:  Negative for chills and fever.   Gastrointestinal:  Negative for abdominal pain, anal bleeding, blood in stool, nausea and vomiting.   Genitourinary:  Positive for flank pain. Negative for dysuria and hematuria.         Current Outpatient Medications:     acetaminophen (TYLENOL) 325 MG tablet, Take 2 tablets by mouth., Disp: , Rfl:     apixaban (Eliquis) 5 MG tablet tablet, Take 1 tablet by mouth Every 12 (Twelve) Hours., Disp: 183 tablet, Rfl: 1    atenolol (TENORMIN) 25 MG tablet, Take 1 tablet by mouth 2 (Two) Times a Day., Disp: 180 tablet, Rfl: 0    Past Medical History:   Diagnosis Date    Anemia     Anxiety     Arterial occlusion, lower extremity     Fibroid uterus     Graves disease     Hyperthyroidism     Kidney stone 08/2024    PONV (postoperative nausea and vomiting)     SVT (supraventricular tachycardia)        Past Surgical History:   Procedure Laterality Date    APPENDECTOMY      ARTERIOGRAM LOWER EXTREMITY Left 05/07/2024    Procedure: ARTERIOGRAM LOWER EXTREMITY, SUCTION THROMBECTOMY LEFT POPLITEAL ARTERY, BALLOON ANGIOPLASTY LEFT  "POSTERIOR TIBIAL ARTERY;  Surgeon: Trever Turcios MD;  Location:  [facility];  Service: Vascular;  Laterality: Left;     SECTION      ENDOSCOPY      EXTRACORPOREAL SHOCKWAVE LITHOTRIPSY (ESWL), STENT INSERTION/REMOVAL Right 2022    Procedure: EXTRACORPOREAL SHOCKWAVE LITHOTRIPSY  RIGHT CYSTO STENT PLACEMENT;  Surgeon: Michele Chavez MD;  Location:  [facility];  Service: Urology;  Laterality: Right;    TUBAL ABDOMINAL LIGATION Bilateral        Social History     Socioeconomic History    Marital status:    Tobacco Use    Smoking status: Every Day     Current packs/day: 1.00     Average packs/day: 1 pack/day for 13.0 years (13.0 ttl pk-yrs)     Types: Cigarettes     Passive exposure: Current    Smokeless tobacco: Never   Vaping Use    Vaping status: Never Used   Substance and Sexual Activity    Alcohol use: Yes     Alcohol/week: 10.0 standard drinks of alcohol     Types: 10 Shots of liquor per week     Comment: recreational    Drug use: Never    Sexual activity: Yes     Partners: Male     Birth control/protection: Tubal ligation       Family History   Problem Relation Age of Onset    Breast cancer Paternal Aunt     Cancer Paternal Aunt         Breast and uterine cancer    Heart disease Mother     Hyperlipidemia Mother     Stroke Mother     COPD Father     Heart disease Father     Anxiety disorder Brother     Anxiety disorder Sister     Cancer Sister         Thyroid    Cancer Maternal Aunt         Ovarian cancer    Thyroid disease Sister         Hashimoto       Objective    Temp 97.5 °F (36.4 °C)   Ht 177.8 cm (70\")   Wt 133 kg (292 lb 1.8 oz)   BMI 41.91 kg/m²     Physical Exam  Abdominal:      Tenderness:  in the suprapubic area and left lower quadrant There is left CVA tenderness.             Results for orders placed or performed in visit on 25   POC Urinalysis Dipstick, Multipro    Collection Time: 25 11:50 AM    Specimen: Urine   Result Value Ref Range    Color Yellow " Yellow, Straw, Dark Yellow, [place]    Clarity, UA Clear Clear    Glucose, UA Negative Negative mg/dL    Bilirubin Negative Negative    Ketones, UA Negative Negative    Specific Gravity  1.015 1.005 - 1.030    Blood, UA Trace (A) Negative    pH, Urine 5.5 5.0 - 8.0    Protein, POC Negative Negative mg/dL    Urobilinogen, UA 0.2 E.U./dL Normal, 0.2 E.U./dL    Nitrite, UA Negative Negative    Leukocytes Small (1+) (A) Negative   KUB independent review    A KUB is available for me to review today.  The image is inspected for a bowel gas pattern and the general bone structure of the spine and pelvis. The kidneys are then inspected closely.  Renal outline is noted if identifiable. The kidney, collecting system, and anticipated path of the ureter are examined for calcifications including those in the true pelvis.  This film reveals:    On the right there are no calcificaitons seen in the kidney or the expected course of the ureter. .    On the left there are no calcificaitons seen in the kidney or the expected course of the ureter. .    Assessment and Plan    Diagnoses and all orders for this visit:    1. History of kidney stones (Primary)  -     XR abdomen kub; Future  -     POC Urinalysis Dipstick, Multipro  -     Urine Culture - Urine, Urine, Random Void    2. Flank pain  -     XR abdomen kub; Future  -     POC Urinalysis Dipstick, Multipro  -     Urine Culture - Urine, Urine, Random Void      KUB today no stones visualized.  Previous CT abdomen and pelvis without contrast completed August 2024 at which time no renal stones visualized other than the 4 mm left distal ureteral stone.  Based on this patient would like to hold off on any further imaging at this time as even if there were a very small stone that patient has formed since the last imaging patient would likely be able to pass on her own.  For now we will send urine for culture to rule out any infection as the culprit.    Patient to contact office if pain worsens  at which time CT stone protocol warranted    For now we will schedule follow-up 1 year

## 2025-02-19 LAB — BACTERIA SPEC AEROBE CULT: NORMAL

## 2025-03-06 ENCOUNTER — OFFICE VISIT (OUTPATIENT)
Dept: FAMILY MEDICINE CLINIC | Facility: CLINIC | Age: 45
End: 2025-03-06
Payer: COMMERCIAL

## 2025-03-06 VITALS
OXYGEN SATURATION: 98 % | HEIGHT: 70 IN | BODY MASS INDEX: 30.21 KG/M2 | WEIGHT: 211 LBS | SYSTOLIC BLOOD PRESSURE: 117 MMHG | HEART RATE: 62 BPM | DIASTOLIC BLOOD PRESSURE: 77 MMHG

## 2025-03-06 DIAGNOSIS — B37.31 CANDIDIASIS OF VAGINA: Primary | ICD-10-CM

## 2025-03-06 DIAGNOSIS — N89.8 VAGINAL ITCHING: ICD-10-CM

## 2025-03-06 PROCEDURE — 99213 OFFICE O/P EST LOW 20 MIN: CPT | Performed by: NURSE PRACTITIONER

## 2025-03-06 RX ORDER — FLUCONAZOLE 150 MG/1
TABLET ORAL
Qty: 2 TABLET | Refills: 0 | Status: SHIPPED | OUTPATIENT
Start: 2025-03-06

## 2025-03-06 NOTE — PROGRESS NOTES
"Chief Complaint  Vaginitis    Subjective    History of Present Illness      Patient presents to [facility] for   History of Present Illness  Pt presents today for yeast infection. Ongoing since yesterday, states she had a hysterectomy in December and one then also       History of Present Illness      Review of Systems   Constitutional: Negative.    HENT: Negative.     Eyes: Negative.    Respiratory: Negative.     Cardiovascular: Negative.    Gastrointestinal: Negative.    Endocrine: Negative.    Genitourinary:  Positive for vaginal discharge.   Musculoskeletal: Negative.    Skin: Negative.    Allergic/Immunologic: Negative.    Neurological: Negative.    Hematological: Negative.    Psychiatric/Behavioral: Negative.         I have reviewed and agree with the HPI and ROS information as above.  Kamryn Hicks, APRN     Objective   Vital Signs:   /77   Pulse 62   Ht 177.8 cm (70\")   Wt 95.7 kg (211 lb)   SpO2 98%   BMI 30.28 kg/m²            Physical Exam  Constitutional:       Appearance: Normal appearance. She is well-developed. She is obese.   HENT:      Head: Normocephalic and atraumatic.      Right Ear: External ear normal.      Left Ear: External ear normal.      Nose: Nose normal. No nasal tenderness or congestion.      Mouth/Throat:      Lips: Pink. No lesions.      Mouth: Mucous membranes are moist. No oral lesions.      Dentition: Normal dentition.      Pharynx: Oropharynx is clear. No pharyngeal swelling, oropharyngeal exudate or posterior oropharyngeal erythema.   Eyes:      General: Lids are normal. Vision grossly intact. No scleral icterus.        Right eye: No discharge.         Left eye: No discharge.      Extraocular Movements: Extraocular movements intact.      Conjunctiva/sclera: Conjunctivae normal.      Right eye: Right conjunctiva is not injected.      Left eye: Left conjunctiva is not injected.      Pupils: Pupils are equal, round, and reactive to " light.   Cardiovascular:      Rate and Rhythm: Normal rate and regular rhythm.      Heart sounds: Normal heart sounds. No murmur heard.     No gallop.   Pulmonary:      Effort: Pulmonary effort is normal.      Breath sounds: Normal breath sounds and air entry. No wheezing, rhonchi or rales.   Musculoskeletal:         General: No tenderness or deformity. Normal range of motion.      Cervical back: Full passive range of motion without pain, normal range of motion and neck supple.      Right lower leg: No edema.      Left lower leg: No edema.   Skin:     General: Skin is warm and dry.      Coloration: Skin is not jaundiced.      Findings: No rash.   Neurological:      Mental Status: She is alert and oriented to person, place, and time.      Sensory: Sensation is intact.      Motor: Motor function is intact.      Coordination: Coordination is intact.      Gait: Gait is intact.   Psychiatric:         Attention and Perception: Attention normal.         Mood and Affect: Mood and affect normal.         Behavior: Behavior is not hyperactive. Behavior is cooperative.         Thought Content: Thought content normal.         Judgment: Judgment normal.          BARBARA-7:      PHQ-2 Depression Screening    Little interest or pleasure in doing things?     Feeling down, depressed, or hopeless?     PHQ-2 Total Score        PHQ-9 Depression Screening  Little interest or pleasure in doing things?     Feeling down, depressed, or hopeless?     PHQ-2 Total Score     Trouble falling or staying asleep, or sleeping too much?     Feeling tired or having little energy?     Poor appetite or overeating?     Feeling bad about yourself - or that you are a failure or have let yourself or your family down?     Trouble concentrating on things, such as reading the newspaper or watching television?     Moving or speaking so slowly that other people could have noticed? Or the opposite - being so fidgety or restless that you have been moving around a lot  more than usual?     Thoughts that you would be better off dead, or of hurting yourself in some way?     PHQ-9 Total Score     If you checked off any problems, how difficult have these problems made it for you to do your work, take care of things at home, or get along with other people?             Result Review  Data Reviewed:              Assessment and Plan      Diagnoses and all orders for this visit:    1. Candidiasis of vagina (Primary)  -     fluconazole (Diflucan) 150 MG tablet; Take 1 tab po x 1 dose. May repeat in 1 week if symptoms continue.  Dispense: 2 tablet; Refill: 0    2. Vaginal itching        Assessment & Plan      Patient here today with complaints of vaginal itching that began 2 days ago.  She had the same symptoms in December after her hysterectomy.  She had lab work completed through her gynecologist office and was told she had a yeast infection.  She states Diflucan worked very well for her.    Plan:    1.  Will send Diflucan 150 mg x 1 dose.  May repeat in 1 week if symptoms continue.  2.  [facility] to [facility] to assess for additional vaginal bacteria.  Will call with results.  3.  Follow-up as needed.    Follow Up   Return if symptoms worsen or fail to improve.  Patient was given instructions and counseling regarding her condition or for health maintenance advice. Please see specific information pulled into the AVS if appropriate.

## 2025-03-10 ENCOUNTER — RESULTS FOLLOW-UP (OUTPATIENT)
Dept: FAMILY MEDICINE CLINIC | Facility: CLINIC | Age: 45
End: 2025-03-10
Payer: COMMERCIAL

## 2025-03-10 DIAGNOSIS — B96.89 BACTERIAL VAGINOSIS: Primary | ICD-10-CM

## 2025-03-10 DIAGNOSIS — N76.0 BACTERIAL VAGINOSIS: Primary | ICD-10-CM

## 2025-03-10 RX ORDER — METRONIDAZOLE 500 MG/1
500 TABLET ORAL 2 TIMES DAILY
Qty: 14 TABLET | Refills: 0 | Status: SHIPPED | OUTPATIENT
Start: 2025-03-10

## 2025-03-10 NOTE — TELEPHONE ENCOUNTER
Spoke with pt via telephone regarding lab results. Pt informed that her  Diatherix shows yeast which looks like Sybil PARTIDA just treated, also shows BV which needs to be treated with flagyl 500mg bid x 7 days please.  Pt CHARLIE. Pt requesting Flagyl be sent to [facility]

## 2025-03-10 NOTE — PROGRESS NOTES
Diatherix shows yeast which looks like Sybil K just treated, also shows BV which needs to be treated with flagyl 500mg bid x 7 days please.

## 2025-03-30 DIAGNOSIS — Z86.79 HISTORY OF ARTERIAL OCCLUSION: ICD-10-CM

## 2025-04-01 RX ORDER — APIXABAN 5 MG/1
5 TABLET, FILM COATED ORAL
Qty: 183 TABLET | Refills: 1 | Status: SHIPPED | OUTPATIENT
Start: 2025-04-01

## 2025-04-09 ENCOUNTER — OFFICE VISIT (OUTPATIENT)
Dept: FAMILY MEDICINE CLINIC | Facility: CLINIC | Age: 45
End: 2025-04-09
Payer: COMMERCIAL

## 2025-04-09 ENCOUNTER — LAB (OUTPATIENT)
Dept: LAB | Facility: HOSPITAL | Age: 45
End: 2025-04-09
Payer: COMMERCIAL

## 2025-04-09 VITALS
DIASTOLIC BLOOD PRESSURE: 62 MMHG | BODY MASS INDEX: 30.21 KG/M2 | SYSTOLIC BLOOD PRESSURE: 110 MMHG | HEIGHT: 70 IN | RESPIRATION RATE: 20 BRPM | HEART RATE: 59 BPM | WEIGHT: 211 LBS

## 2025-04-09 DIAGNOSIS — I47.10 PAROXYSMAL SVT (SUPRAVENTRICULAR TACHYCARDIA): ICD-10-CM

## 2025-04-09 DIAGNOSIS — Z90.711 HISTORY OF PARTIAL HYSTERECTOMY: ICD-10-CM

## 2025-04-09 DIAGNOSIS — Z12.31 SCREENING MAMMOGRAM FOR BREAST CANCER: ICD-10-CM

## 2025-04-09 DIAGNOSIS — E05.00 GRAVES DISEASE: ICD-10-CM

## 2025-04-09 DIAGNOSIS — E66.811 CLASS 1 OBESITY WITH BODY MASS INDEX (BMI) OF 30.0 TO 30.9 IN ADULT, UNSPECIFIED OBESITY TYPE, UNSPECIFIED WHETHER SERIOUS COMORBIDITY PRESENT: ICD-10-CM

## 2025-04-09 DIAGNOSIS — Z86.79 HISTORY OF ARTERIAL OCCLUSION: ICD-10-CM

## 2025-04-09 DIAGNOSIS — F17.200 TOBACCO DEPENDENCE: ICD-10-CM

## 2025-04-09 DIAGNOSIS — Z00.00 WELLNESS EXAMINATION: ICD-10-CM

## 2025-04-09 DIAGNOSIS — Z00.00 WELLNESS EXAMINATION: Primary | ICD-10-CM

## 2025-04-09 PROBLEM — Z90.710 HISTORY OF HYSTERECTOMY: Status: ACTIVE | Noted: 2025-04-09

## 2025-04-09 LAB
ALBUMIN SERPL-MCNC: 4.6 G/DL (ref 3.5–5)
ALBUMIN/GLOB SERPL: 1.5 G/DL (ref 1.1–2.5)
ALP SERPL-CCNC: 58 U/L (ref 24–120)
ALT SERPL W P-5'-P-CCNC: 27 U/L (ref 0–35)
ANION GAP SERPL CALCULATED.3IONS-SCNC: 5 MMOL/L (ref 4–13)
AST SERPL-CCNC: 22 U/L (ref 7–45)
AUTO MIXED CELLS #: 0.7 10*3/MM3 (ref 0.1–2.6)
AUTO MIXED CELLS %: 9.2 % (ref 0.1–24)
BILIRUB SERPL-MCNC: 0.2 MG/DL (ref 0.1–1)
BILIRUB UR QL STRIP: NEGATIVE
BUN SERPL-MCNC: 13 MG/DL (ref 5–21)
BUN/CREAT SERPL: 16.3
CALCIUM SPEC-SCNC: 9.1 MG/DL (ref 8.6–10.5)
CHLORIDE SERPL-SCNC: 105 MMOL/L (ref 98–110)
CHOLEST SERPL-MCNC: 185 MG/DL (ref 130–200)
CLARITY UR: CLEAR
CO2 SERPL-SCNC: 28 MMOL/L (ref 24–31)
COLOR UR: YELLOW
CREAT SERPL-MCNC: 0.8 MG/DL (ref 0.5–1.4)
EGFRCR SERPLBLD CKD-EPI 2021: 94.5 ML/MIN/1.73
ERYTHROCYTE [DISTWIDTH] IN BLOOD BY AUTOMATED COUNT: 17.8 % (ref 12.3–15.4)
GLOBULIN UR ELPH-MCNC: 3 GM/DL
GLUCOSE SERPL-MCNC: 104 MG/DL (ref 65–99)
GLUCOSE UR STRIP-MCNC: NEGATIVE MG/DL
HBA1C MFR BLD: 5.4 % (ref 4.8–5.9)
HCT VFR BLD AUTO: 41.6 % (ref 34–46.6)
HDLC SERPL-MCNC: 43 MG/DL
HGB BLD-MCNC: 13.1 G/DL (ref 12–15.9)
HGB UR QL STRIP.AUTO: NEGATIVE
KETONES UR QL STRIP: NEGATIVE
LDLC SERPL CALC-MCNC: 113 MG/DL (ref 0–99)
LDLC/HDLC SERPL: 2.54 {RATIO}
LEUKOCYTE ESTERASE UR QL STRIP.AUTO: NEGATIVE
LYMPHOCYTES # BLD AUTO: 2.5 10*3/MM3 (ref 0.7–3.1)
LYMPHOCYTES NFR BLD AUTO: 30.3 % (ref 19.6–45.3)
MCH RBC QN AUTO: 27.2 PG (ref 26.6–33)
MCHC RBC AUTO-ENTMCNC: 31.5 G/DL (ref 31.5–35.7)
MCV RBC AUTO: 86.5 FL (ref 79–97)
NEUTROPHILS NFR BLD AUTO: 4.9 10*3/MM3 (ref 1.7–7)
NEUTROPHILS NFR BLD AUTO: 60.5 % (ref 42.7–76)
NITRITE UR QL STRIP: NEGATIVE
PH UR STRIP.AUTO: 6 [PH] (ref 5–8)
PLATELET # BLD AUTO: 234 10*3/MM3 (ref 140–450)
PMV BLD AUTO: 8.8 FL (ref 6–12)
POTASSIUM SERPL-SCNC: 4.2 MMOL/L (ref 3.5–5.3)
PROT SERPL-MCNC: 7.6 G/DL (ref 6.3–8.7)
PROT UR QL STRIP: NEGATIVE
RBC # BLD AUTO: 4.81 10*6/MM3 (ref 3.77–5.28)
SODIUM SERPL-SCNC: 138 MMOL/L (ref 135–145)
SP GR UR STRIP: 1.02 (ref 1–1.03)
T4 FREE SERPL-MCNC: 1.07 NG/DL (ref 0.92–1.68)
TRIGL SERPL-MCNC: 164 MG/DL (ref 0–149)
TSH SERPL DL<=0.05 MIU/L-ACNC: 2.52 UIU/ML (ref 0.27–4.2)
UROBILINOGEN UR QL STRIP: NORMAL
VLDLC SERPL-MCNC: 29 MG/DL (ref 5–40)
WBC NRBC COR # BLD AUTO: 8.1 10*3/MM3 (ref 3.4–10.8)

## 2025-04-09 PROCEDURE — 80050 GENERAL HEALTH PANEL: CPT

## 2025-04-09 PROCEDURE — 84439 ASSAY OF FREE THYROXINE: CPT

## 2025-04-09 PROCEDURE — 80061 LIPID PANEL: CPT

## 2025-04-09 PROCEDURE — 81003 URINALYSIS AUTO W/O SCOPE: CPT

## 2025-04-09 PROCEDURE — 83036 HEMOGLOBIN GLYCOSYLATED A1C: CPT

## 2025-04-09 PROCEDURE — 36415 COLL VENOUS BLD VENIPUNCTURE: CPT

## 2025-04-09 PROCEDURE — 84432 ASSAY OF THYROGLOBULIN: CPT

## 2025-04-09 RX ORDER — ATENOLOL 25 MG/1
25 TABLET ORAL 2 TIMES DAILY
Qty: 180 TABLET | Refills: 1 | Status: SHIPPED | OUTPATIENT
Start: 2025-04-09

## 2025-04-09 NOTE — PROGRESS NOTES
Chief Complaint  svt and Wellness Check    Subjective    History of Present Illness      Patient presents to [facility] for        History of Present Illness  The patient presents for a follow-up visit and wellness exam.     She reports experiencing allergy flare-ups, which have not been effectively managed with Claritin. Symptoms include dizziness, lightheadedness, transient ear pain, sneezing, and mucus drainage. She also experiences occasional coughing but no fever. These symptoms persisted for 5 days, subsided, and then recurred for an additional 10 days. She has been managing these symptoms with Claritin and Benadryl as needed.    She underwent a hysterectomy on 12/18/2024, during which she was transitioned from Eliquis to Lovenox and back to Eliquis without complications. The procedure was performed at [facility] by Dr. Jordan McKenzie Hatchet. Her ovaries were preserved, but a lesion was removed from her labia. Postoperatively, she experienced significant pain, which was managed with fentanyl and Dilaudid. She had a yeast infection at her 2-week postoperative checkup, which was asymptomatic. A few weeks later, she developed itchiness and was diagnosed with a yeast infection and vaginosis. She was prescribed Flagyl, which she discontinued due to adverse effects. She reports no discharge, color change, or odor. She has resumed sexual activity.     She continues her anticoagulation therapy with Eliquis and atenolol. She reports feeling well overall, with stable SVT symptoms. However, she experiences occasional palpitations, which she has discussed with her cardiologist. Her last cardiology appointment was approximately 6 months ago, and she has a follow-up scheduled for 04/14/2025. She monitors her heart rate regularly, noting that it ranges from 50s at rest to 80s upon standing. She reports improved energy levels and anxiety.    She has noticed a new symptom of pain in her leg  "when walking, which she attributes to increased physical activity. She does not believe this is related to a blood clot.    She has a history of Graves' disease and is scheduled for an eye exam due to changes in her vision, although she reports no grittiness in her eyes.    She is due for her annual mammogram but is uncertain of the date of her last one. She has been undergoing mammograms since age 34 due to a breast lump.    She has not yet quit smoking and has an inhaler but does not use it.    SOCIAL HISTORY  She smokes.    FAMILY HISTORY  Her sister had Hashimoto's disease and cancer from it.    MEDICATIONS  Current: Eliquis, atenolol, Claritin, Benadryl    Review of Systems    I have reviewed and agree with the HPI and ROS information as above.  Lela Boyle, APRSILVINA     Objective   Vital Signs:   /62   Pulse 59   Resp 20   Ht 177.8 cm (70\")   Wt 95.7 kg (211 lb)   BMI 30.28 kg/m²            Physical Exam  Constitutional:       Appearance: She is well-developed. She is obese.   HENT:      Head: Normocephalic and atraumatic.      Right Ear: Tympanic membrane, ear canal and external ear normal.      Left Ear: Tympanic membrane, ear canal and external ear normal.      Nose: Nose normal. No septal deviation, nasal tenderness or congestion.      Mouth/Throat:      Lips: Pink. No lesions.      Mouth: Mucous membranes are moist. No oral lesions.      Dentition: Normal dentition.      Pharynx: Oropharynx is clear. No pharyngeal swelling, oropharyngeal exudate or posterior oropharyngeal erythema.   Eyes:      General: Lids are normal. Vision grossly intact. No scleral icterus.        Right eye: No discharge.         Left eye: No discharge.      Extraocular Movements: Extraocular movements intact.      Conjunctiva/sclera: Conjunctivae normal.      Right eye: Right conjunctiva is not injected.      Left eye: Left conjunctiva is not injected.      Pupils: Pupils are equal, round, and reactive to light.   Neck:    "   Thyroid: No thyroid mass.      Trachea: Trachea normal.   Cardiovascular:      Rate and Rhythm: Regular rhythm. Bradycardia present.      Heart sounds: Normal heart sounds. No murmur heard.     No gallop.   Pulmonary:      Effort: Pulmonary effort is normal.      Breath sounds: Normal breath sounds and air entry. No wheezing, rhonchi or rales.   Abdominal:      General: There is no distension.      Palpations: Abdomen is soft. There is no mass.      Tenderness: There is no abdominal tenderness. There is no right CVA tenderness, left CVA tenderness, guarding or rebound.   Musculoskeletal:         General: No tenderness or deformity. Normal range of motion.      Cervical back: Full passive range of motion without pain, normal range of motion and neck supple.      Thoracic back: Normal.      Right lower leg: No edema.      Left lower leg: No edema.   Skin:     General: Skin is warm and dry.      Coloration: Skin is not jaundiced.      Findings: No rash.   Neurological:      Mental Status: She is alert and oriented to person, place, and time.      Sensory: Sensation is intact.      Motor: Motor function is intact.      Coordination: Coordination is intact.      Gait: Gait is intact.      Deep Tendon Reflexes: Reflexes are normal and symmetric.   Psychiatric:         Mood and Affect: Mood and affect normal.         Judgment: Judgment normal.          PHQ-2 Depression Screening    Little interest or pleasure in doing things? Not at all   Feeling down, depressed, or hopeless? Not at all   PHQ-2 Total Score 0      PHQ-9 Depression Screening  Little interest or pleasure in doing things? Not at all   Feeling down, depressed, or hopeless? Not at all   PHQ-2 Total Score 0   Trouble falling or staying asleep, or sleeping too much?     Feeling tired or having little energy?     Poor appetite or overeating?     Feeling bad about yourself - or that you are a failure or have let yourself or your family down?     Trouble  concentrating on things, such as reading the newspaper or watching television?     Moving or speaking so slowly that other people could have noticed? Or the opposite - being so fidgety or restless that you have been moving around a lot more than usual?     Thoughts that you would be better off dead, or of hurting yourself in some way?     PHQ-9 Total Score     If you checked off any problems, how difficult have these problems made it for you to do your work, take care of things at home, or get along with other people? Not difficult at all           Result Review  Data Reviewed:                   Assessment and Plan      Diagnoses and all orders for this visit:    1. Wellness examination (Primary)  -     CBC Auto Differential; Future  -     Comprehensive Metabolic Panel; Future  -     Lipid Panel; Future  -     TSH; Future  -     Urinalysis With Culture If Indicated - Urine, Clean Catch; Future  -     Hemoglobin A1c; Future    2. Paroxysmal SVT (supraventricular tachycardia)  -     atenolol (TENORMIN) 25 MG tablet; Take 1 tablet by mouth 2 (Two) Times a Day.  Dispense: 180 tablet; Refill: 1    3. History of arterial occlusion  -     apixaban (Eliquis) 5 MG tablet tablet; Take 1 tablet by mouth Every 12 (Twelve) Hours.  Dispense: 180 tablet; Refill: 5    4. Tobacco dependence    5. History of partial hysterectomy    6. Graves disease  -     T4, free; Future  -     Thyroglobulin; Future    7. Screening mammogram for breast cancer  -     Mammo Screening Digital Tomosynthesis Bilateral With CAD; Future    8. Class 1 obesity with body mass index (BMI) of 30.0 to 30.9 in adult, unspecified obesity type, unspecified whether serious comorbidity present        Assessment & Plan  1. Allergic rhinitis.  She reports that Claritin is no longer effective in managing her symptoms. She experiences dizziness, lightheadedness, random ear pain, sneezing, and mucus drainage. Allegra was recommended as an alternative treatment option due  "to its stronger efficacy and non-drowsy formulation.     2. Post-hysterectomy status.  She had a hysterectomy on December 18, 2024, and has since resumed Eliquis after bridging with Lovenox. She reports no current issues related to the surgery. She was advised to continue regular gynecological examinations, including pelvic exams, despite the absence of a cervix.    3. Supraventricular tachycardia (SVT).  She reports occasional palpitations and \"weird\" heart sensations intermittently. Her resting heart rate is around 50 bpm, increasing to 80 bpm upon standing. A Holter monitor was considered but deferred pending her upcoming cardiology appointment on April 14, 2025. She was advised to discuss her palpitations with her cardiologist during this visit.    4. Vaginal yeast infection.  She experienced a yeast infection post-hysterectomy, which was treated.     5. Anxiety.  She reports significant improvement in her anxiety symptoms and attributes this to better management of her health conditions.     6. Graves' disease.  She has a history of Graves' disease and will have thyroid function tests, including thyroglobulin levels, as part of her wellness labs. She was advised to continue annual eye exams due to her history of Graves' disease.    7. Health maintenance.  She is due for a mammogram, which will be scheduled. Wellness labs will be conducted today, including thyroid function tests. Medication refills will be provided. Healthy diet and lifestyle counseling done to include diet and exercise.     8. History of arterial occlusion  Still following with vascular annually. Continue anticoagulation.     Follow-up  The patient will follow up in 6 months or sooner if necessary.    PROCEDURE  The patient underwent a hysterectomy on 12/18/2024.        Follow Up   Return in about 6 months (around 10/9/2025).  Patient was given instructions and counseling regarding her condition or for health maintenance advice. Please see " specific information pulled into the AVS if appropriate.     Patient or patient representative verbalized consent for the use of Ambient Listening during the visit with  MELLISSA Ching for chart documentation. 4/9/2025  10:02 CDT

## 2025-04-14 ENCOUNTER — OFFICE VISIT (OUTPATIENT)
Dept: CARDIOLOGY | Facility: CLINIC | Age: 45
End: 2025-04-14
Payer: COMMERCIAL

## 2025-04-14 VITALS
WEIGHT: 210 LBS | SYSTOLIC BLOOD PRESSURE: 127 MMHG | BODY MASS INDEX: 30.06 KG/M2 | HEART RATE: 60 BPM | HEIGHT: 70 IN | DIASTOLIC BLOOD PRESSURE: 85 MMHG | OXYGEN SATURATION: 98 %

## 2025-04-14 DIAGNOSIS — I70.209 ARTERIAL OCCLUSION, LOWER EXTREMITY: ICD-10-CM

## 2025-04-14 DIAGNOSIS — I47.10 SVT (SUPRAVENTRICULAR TACHYCARDIA): Primary | ICD-10-CM

## 2025-04-14 DIAGNOSIS — Z72.0 TOBACCO USE: ICD-10-CM

## 2025-04-14 PROCEDURE — 99214 OFFICE O/P EST MOD 30 MIN: CPT | Performed by: INTERNAL MEDICINE

## 2025-04-14 PROCEDURE — 93000 ELECTROCARDIOGRAM COMPLETE: CPT | Performed by: INTERNAL MEDICINE

## 2025-04-14 RX ORDER — LORATADINE 10 MG/1
10 TABLET ORAL DAILY
COMMUNITY

## 2025-04-14 NOTE — PROGRESS NOTES
"Chief Complaint  SVT (supraventricular tachycardia) (6 mo fu)    Subjective      Denise Ahn presents to [facility] CARDIOLOGY  History of Present Illness  Denise was hospitalized in September 2024 with severe anemia requiring transfusion.  She subsequently underwent a hysterectomy and has had no problems with anemia since.  Her palpitations are still occurring rarely but they have improved and are less symptomatic.  She does not wish to increase her beta-blocker at the present time as she is somewhat concerned about having her resting bradycardia.  The patient's hypercoagulable workup turned out negative.  Denise is not having any chest discomfort or shortness of breath.  She has had no PND or orthopnea.  No syncope or presyncope.  She continues to smoke up to several cigarettes daily.  She actually had quit at some point, only to resume later but at a much lower level.    Objective   Vital Signs:  /85 (BP Location: Left arm, Patient Position: Sitting, Cuff Size: Adult)   Pulse 60   Ht 177.8 cm (70\")   Wt 95.3 kg (210 lb)   SpO2 98%   BMI 30.13 kg/m²   Estimated body mass index is 30.13 kg/m² as calculated from the following:    Height as of this encounter: 177.8 cm (70\").    Weight as of this encounter: 95.3 kg (210 lb).            Physical Exam  A 44-year-old female in no apparent distress.  She is awake alert and oriented.  HEENT: No scleral icterus.  Neck: No carotid bruits or jugular venous distention.  Lungs: Clear to auscultation.  Heart: Regular rhythm without audible murmur or gallop sound.  Extremities: No pretibial edema or cyanosis.  Neurological no obvious focal abnormalities noted.    Result Review :              ECG 12 Lead    Date/Time: 4/14/2025 2:42 PM  Performed by: Swapnil Serrano MD    Authorized by: Swapnil Serrano MD  Comparison: compared with previous ECG from 11/4/2024  Comparison to previous ECG: Right axis deviation has appeared " since previous tracing  Rhythm: sinus rhythm  Conduction: conduction normal  ST Segments: ST segments normal  T Waves: T waves normal  QRS axis: right  Other: no other findings    Clinical impression: normal ECG            Assessment and Plan   Diagnoses and all orders for this visit:    1. SVT (supraventricular tachycardia) (Primary)    2. Arterial occlusion, lower extremity    3. Tobacco use    1.  Supraventricular tachycardia.  Minimally symptomatic.  Continue atenolol 25 mg daily.    2.  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  She is on Eliquis and has been told that this should continue indefinitely.  I reviewed the patient's echo from last September and note that no saline contrast study was performed which I would certainly include with the next transthoracic or transesophageal echocardiogram.  Even if she were to have a PFO, we would not be certain whether or not this played a role in her arterial embolus and she would probably still need to continue anticoagulation.    3.  Ongoing tobacco use.  She is planning to quit.  She smokes at most 4 cigarettes daily.  I did  her about the health risks of tobacco use once again including increased risk of thrombosis.  She is aware.    All questions were answered to the best of my ability.  A return visit is scheduled in 6 months.  She is encouraged to contact us for any further questions or concerns.    As always, I appreciate the opportunity to be involved in the care of your patients.      There are no Patient Instructions on file for this visit.       Follow Up   Return in about 6 months (around 10/14/2025) for Next scheduled follow up.  Patient was given instructions and counseling regarding her condition or for health maintenance advice. Please see specific information pulled into the AVS if appropriate.

## 2025-04-15 ENCOUNTER — RESULTS FOLLOW-UP (OUTPATIENT)
Dept: FAMILY MEDICINE CLINIC | Facility: CLINIC | Age: 45
End: 2025-04-15
Payer: COMMERCIAL

## 2025-04-15 NOTE — PROGRESS NOTES
Please let pt know results: UA is clear, A1c is nondiabetic, thyroid testing is normal, CBC looks good.  Her hemoglobin has improved to a normal range.  Lipid panel is satisfactory.  CMP looks okay.

## 2025-04-16 LAB
NCCN CRITERIA FLAG: ABNORMAL
THYROGLOB SERPL-MCNC: 42 NG/ML
TYRER CUZICK SCORE: 10.7

## 2025-04-18 ENCOUNTER — RESULTS FOLLOW-UP (OUTPATIENT)
Facility: HOSPITAL | Age: 45
End: 2025-04-18
Payer: COMMERCIAL

## 2025-04-18 DIAGNOSIS — Z13.79 GENETIC TESTING: Primary | ICD-10-CM

## 2025-04-18 NOTE — PROGRESS NOTES
Please let pt know results: thyroglobulin is just barely elevated normal <40 and hers was 42, has a stable tsh and free t4 and US from Oct of last year. Will just monitor thyroid annually.

## 2025-04-18 NOTE — PROGRESS NOTES
This patient recently completed the CARE risk assessment for a mammogram appointment. Based on the patient's responses, NCCN criteria for genetic testing was met. At the time of the assessment, the patient was provided with both written and video educational materials regarding genetic testing.    Navigator follow-up: I discussed the option of hereditary cancer genetic testing with the patient including outlining the next steps to proceed with testing, the insurance process, and potential testing outcomes. The patient would like to proceed with genetic testing.  I will submit an order for approval to the provider and coordinate care.

## 2025-05-01 ENCOUNTER — LAB (OUTPATIENT)
Dept: LAB | Facility: HOSPITAL | Age: 45
End: 2025-05-01
Payer: COMMERCIAL

## 2025-05-01 ENCOUNTER — HOSPITAL ENCOUNTER (OUTPATIENT)
Dept: MAMMOGRAPHY | Facility: HOSPITAL | Age: 45
Discharge: HOME | End: 2025-05-01
Payer: COMMERCIAL

## 2025-05-01 DIAGNOSIS — Z13.79 GENETIC TESTING: ICD-10-CM

## 2025-05-01 DIAGNOSIS — Z12.31 SCREENING MAMMOGRAM FOR BREAST CANCER: ICD-10-CM

## 2025-05-01 PROCEDURE — 77063 BREAST TOMOSYNTHESIS BI: CPT

## 2025-05-01 PROCEDURE — 77067 SCR MAMMO BI INCL CAD: CPT

## 2025-07-11 ENCOUNTER — OFFICE VISIT (OUTPATIENT)
Dept: FAMILY MEDICINE CLINIC | Facility: CLINIC | Age: 45
End: 2025-07-11
Payer: COMMERCIAL

## 2025-07-11 VITALS
HEART RATE: 55 BPM | RESPIRATION RATE: 20 BRPM | HEIGHT: 70 IN | BODY MASS INDEX: 30.21 KG/M2 | SYSTOLIC BLOOD PRESSURE: 149 MMHG | WEIGHT: 211 LBS | DIASTOLIC BLOOD PRESSURE: 77 MMHG

## 2025-07-11 DIAGNOSIS — F17.200 TOBACCO DEPENDENCE: ICD-10-CM

## 2025-07-11 DIAGNOSIS — F43.9 STRESS AT HOME: ICD-10-CM

## 2025-07-11 DIAGNOSIS — H81.12 BENIGN PAROXYSMAL POSITIONAL VERTIGO OF LEFT EAR: Primary | ICD-10-CM

## 2025-07-11 DIAGNOSIS — H69.92 DYSFUNCTION OF LEFT EUSTACHIAN TUBE: ICD-10-CM

## 2025-07-11 PROCEDURE — 99214 OFFICE O/P EST MOD 30 MIN: CPT | Performed by: NURSE PRACTITIONER

## 2025-07-11 PROCEDURE — 93000 ELECTROCARDIOGRAM COMPLETE: CPT | Performed by: NURSE PRACTITIONER

## 2025-07-11 PROCEDURE — 96372 THER/PROPH/DIAG INJ SC/IM: CPT | Performed by: NURSE PRACTITIONER

## 2025-07-11 RX ORDER — LORAZEPAM 0.5 MG/1
0.5 TABLET ORAL EVERY 8 HOURS PRN
Qty: 10 TABLET | Refills: 0 | Status: SHIPPED | OUTPATIENT
Start: 2025-07-11

## 2025-07-11 RX ORDER — MECLIZINE HYDROCHLORIDE 25 MG/1
25 TABLET ORAL 3 TIMES DAILY PRN
Qty: 20 TABLET | Refills: 0 | Status: SHIPPED | OUTPATIENT
Start: 2025-07-11

## 2025-07-11 RX ORDER — DEXAMETHASONE SODIUM PHOSPHATE 4 MG/ML
6 INJECTION, SOLUTION INTRA-ARTICULAR; INTRALESIONAL; INTRAMUSCULAR; INTRAVENOUS; SOFT TISSUE ONCE
Status: COMPLETED | OUTPATIENT
Start: 2025-07-11 | End: 2025-07-11

## 2025-07-11 RX ADMIN — DEXAMETHASONE SODIUM PHOSPHATE 6 MG: 4 INJECTION, SOLUTION INTRA-ARTICULAR; INTRALESIONAL; INTRAMUSCULAR; INTRAVENOUS; SOFT TISSUE at 10:34

## 2025-07-11 NOTE — PROGRESS NOTES
Chief Complaint  Headache and Dizziness    Subjective    History of Present Illness      Patient presents to [facility] PRIMARY CARE for        History of Present Illness  The patient presents for evaluation of dizziness, anxiety, and blood pressure management.    She has been experiencing symptoms of dizziness and fatigue, which she attributes to her anxiety. These symptoms have persisted for several days, with the onset traced back to 07/03/2025. She recalls feeling extremely tired after mowing the lawn, leading her to shower and prepare dinner earlier than usual before retiring to bed early. Despite feeling relaxed upon waking, she experienced severe fatigue the following day. On 07/05/2025, she woke up feeling dizzy, describing the sensation as if she were being pushed to the side. This dizziness triggered her anxiety, resulting in pain in her shoulders, chest, and armpit. The pain would subside after two hours, only to be replaced by another symptom. She has been taking Claritin since the onset of the dizziness, suspecting it might be due to allergies. She also took Benadryl to aid sleep and used leftover Ativan from the previous year, taking half a tablet on 07/06/2025 or 07/07/2025 and the other half the next day. She did not take Claritin on 07/10/2025 but used Benadryl instead. Her sleep has been disrupted for the past few days, although the Ativan did help her sleep for two nights.    She has been monitoring her blood pressure due to a headache she experienced on 07/06/2025 or 07/07/2025. She took two Tylenol tablets for the headache, which is unusual for her as she has not needed pain relief since her hysterectomy. She wondered if her dizziness and headache were due to high blood pressure, so she checked her blood pressure, which was 139 systolic. After waiting 15 minutes, she rechecked it and found it had risen to 140 systolic, and then 150 systolic after another 15 minutes. She took  "Ativan an hour later, which brought her blood pressure down to 126 systolic. She went to sleep and woke up the next morning with normal blood pressure, but it bella to the high 140s systolic an hour later. She has been checking her blood pressure in the morning and evening, and it has been stable, although not at her usual level. She believes this is due to her anxiety. Her heart rate has been low, ranging from the mid-40s to low 50s. She has been taking atenolol twice a day, with the last dose taken at 6:30 this morning.    She experiences dizziness while sitting on her bed, feeling as if she is being pushed down and back to the left. This sensation causes her heart rate to increase. She has been trying to manage her symptoms on her own. She was doing well for two months, working out regularly and losing four pounds. She has been very active but feels unwell. She thought she had some allergy symptoms in her head, but the dizziness and headache felt like pressure. She does not think the Claritin helped. She has been taking it for nine days straight, except for 07/10/2025 when she took Benadryl. She uses gum slides, which help her when she chews them. She has been drinking water with electrolytes. She received a pediatric steroid injection last time, which she tolerated well. She has been feeling stressed this week due to her busy schedule and has been working from home. She plans to ignore her symptoms tomorrow as she believes stress will worsen them. She has noticed that lying on certain sides of the bed and spending a lot of time on screens is not good for her eyes.    Review of Systems    I have reviewed and agree with the HPI and ROS information as above.  MELLISSA Ching     Objective   Vital Signs:   /77   Pulse 55   Resp 20   Ht 177.8 cm (70\")   Wt 95.7 kg (211 lb)   BMI 30.28 kg/m²           Physical Exam  Constitutional:       Appearance: Normal appearance. She is well-developed.   HENT:      " Head: Normocephalic and atraumatic.      Right Ear: Tympanic membrane, ear canal and external ear normal.      Left Ear: Ear canal and external ear normal. Tympanic membrane is bulging.      Ears:      Comments: Elizabeth Hallpike pos to left      Nose: Nose normal. No septal deviation, nasal tenderness or congestion.      Mouth/Throat:      Lips: Pink. No lesions.      Mouth: Mucous membranes are moist. No oral lesions.      Dentition: Normal dentition.      Pharynx: Oropharynx is clear. No pharyngeal swelling, oropharyngeal exudate or posterior oropharyngeal erythema.   Eyes:      General: Lids are normal. Vision grossly intact. No scleral icterus.        Right eye: No discharge.         Left eye: No discharge.      Extraocular Movements: Extraocular movements intact.      Conjunctiva/sclera: Conjunctivae normal.      Right eye: Right conjunctiva is not injected.      Left eye: Left conjunctiva is not injected.      Pupils: Pupils are equal, round, and reactive to light.   Neck:      Thyroid: No thyroid mass.      Trachea: Trachea normal.   Cardiovascular:      Rate and Rhythm: Normal rate and regular rhythm.      Heart sounds: Normal heart sounds. No murmur heard.     No gallop.   Pulmonary:      Effort: Pulmonary effort is normal.      Breath sounds: Normal breath sounds and air entry. No wheezing, rhonchi or rales.   Abdominal:      General: There is no distension.      Palpations: Abdomen is soft. There is no mass.      Tenderness: There is no abdominal tenderness. There is no right CVA tenderness, left CVA tenderness, guarding or rebound.   Musculoskeletal:         General: No tenderness or deformity. Normal range of motion.      Cervical back: Full passive range of motion without pain, normal range of motion and neck supple.      Thoracic back: Normal.      Right lower leg: No edema.      Left lower leg: No edema.   Skin:     General: Skin is warm and dry.      Coloration: Skin is not jaundiced.      Findings: No  rash.   Neurological:      Mental Status: She is alert and oriented to person, place, and time.      Sensory: Sensation is intact.      Motor: Motor function is intact.      Coordination: Coordination is intact.      Gait: Gait is intact.      Deep Tendon Reflexes: Reflexes are normal and symmetric.   Psychiatric:         Mood and Affect: Mood and affect normal.         Judgment: Judgment normal.            PHQ-2 Depression Screening    Little interest or pleasure in doing things? Not at all   Feeling down, depressed, or hopeless? Not at all   PHQ-2 Total Score 0      PHQ-9 Depression Screening  Little interest or pleasure in doing things? Not at all   Feeling down, depressed, or hopeless? Not at all   PHQ-2 Total Score 0   Trouble falling or staying asleep, or sleeping too much?     Feeling tired or having little energy?     Poor appetite or overeating?     Feeling bad about yourself - or that you are a failure or have let yourself or your family down?     Trouble concentrating on things, such as reading the newspaper or watching television?     Moving or speaking so slowly that other people could have noticed? Or the opposite - being so fidgety or restless that you have been moving around a lot more than usual?     Thoughts that you would be better off dead, or of hurting yourself in some way?     PHQ-9 Total Score     If you checked off any problems, how difficult have these problems made it for you to do your work, take care of things at home, or get along with other people? Not difficult at all           Result Review  Data Reviewed:              ECG 12 Lead    Date/Time: 7/11/2025 10:36 AM  Performed by: Lela Boyle APRN    Authorized by: Lela Boyle APRN  Comparison: not compared with previous ECG   Rhythm: sinus bradycardia  Rate: bradycardic  BPM: 55  Conduction: conduction normal  Comments: Bradycardia, otherwise normal ecg             Assessment and Plan      Diagnoses and all orders for this  visit:    1. Benign paroxysmal positional vertigo of left ear (Primary)  -     dexAMETHasone (DECADRON) injection 6 mg  -     meclizine (ANTIVERT) 25 MG tablet; Take 1 tablet by mouth 3 (Three) Times a Day As Needed for Dizziness or Nausea.  Dispense: 20 tablet; Refill: 0  -     ECG 12 Lead    2. Dysfunction of left eustachian tube  -     dexAMETHasone (DECADRON) injection 6 mg  -     meclizine (ANTIVERT) 25 MG tablet; Take 1 tablet by mouth 3 (Three) Times a Day As Needed for Dizziness or Nausea.  Dispense: 20 tablet; Refill: 0    3. Stress at home  -     LORazepam (Ativan) 0.5 MG tablet; Take 1 tablet by mouth Every 8 (Eight) Hours As Needed for Anxiety or Sedation.  Dispense: 10 tablet; Refill: 0    4. Tobacco dependence        Assessment & Plan  1. Benign left-sided vertigo.  - The left eustachian tube is significantly congested, likely due to recent allergies or an infection. This congestion is causing left-sided vertigo.  - A treatment plan involving meclizine and Zofran has been proposed. Meclizine is expected to alleviate inner ear dizziness and nausea, although it may cause drowsiness.  - A steroid injection will be administered today due to the fullness of the ear. If symptoms persist into next week, vestibular physical therapy will be considered.    2. High-functioning anxiety.  - The patient experiences high-functioning anxiety, which exacerbates her symptoms.  - She has been advised to continue self-soothing techniques and monitor her symptoms.  - A refill of Ativan will be provided to help manage anxiety during stressful events.  - She is advised not to take Ativan and meclizine together due to the potential for increased drowsiness.    3. Blood pressure management.  - The patient reported elevated blood pressure readings during episodes of dizziness and anxiety.  - She is currently taking atenolol twice a day and has been monitoring her blood pressure at home.  - No changes to her current medication  regimen are necessary at this time.  - Blood pressure readings have been variable but generally within acceptable limits.        Follow Up   Return if symptoms worsen or fail to improve.  Patient was given instructions and counseling regarding her condition or for health maintenance advice. Please see specific information pulled into the AVS if appropriate.     Patient or patient representative verbalized consent for the use of Ambient Listening during the visit with  MELLISSA Ching for chart documentation. 7/11/2025  10:38 CDT

## 2025-07-11 NOTE — PROGRESS NOTES
After obtaining consent, and per orders of Lela MALLOY, injection of Decadron 8mg administered to R dorso gluteal IM. Given by Lucia Walls MA. Patient instructed to remain in clinic for 20 minutes afterwards, and to report any adverse reaction to me immediately. Pt tolerated well with no adverse reactions.

## (undated) DEVICE — DESTINATION PERIPHERAL GUIDING SHEATH: Brand: DESTINATION

## (undated) DEVICE — 1016 S-DRAPE IRRIG POUCH 10/BOX: Brand: STERI-DRAPE™

## (undated) DEVICE — POLYVINYL UROLOGICAL DRAINAGE BAG FOR SIEMENS SYSTEMS - NON-STERILE: Brand: CUSTOM MEDICAL SPECIALTIES, INC.

## (undated) DEVICE — TBG DRN URINARY 9/32IN

## (undated) DEVICE — PK CYSTO 30

## (undated) DEVICE — THE PRONTO CATHETER IS INDICATED FOR THE REMOVAL OF FRESH, SOFT EMBOLI AND THROMBI FROM VESSELS IN THE CORONARY AND PERIPHERAL VASCULATURE.: Brand: PRONTO® V4 EXTRACTION CATHETER

## (undated) DEVICE — CANSTR COL ENGINE FOR INDIGO SYS

## (undated) DEVICE — RADIFOCUS GLIDEWIRE: Brand: GLIDEWIRE

## (undated) DEVICE — PK TURNOVER RM ADV

## (undated) DEVICE — PERCLOSE™ PROSTYLE™ SUTURE-MEDIATED CLOSURE AND REPAIR SYSTEM: Brand: PERCLOSE™ PROSTYLE™

## (undated) DEVICE — GW SENSR DUALFLEX NITNL STR .038IN 3X150CM

## (undated) DEVICE — PINNACLE INTRODUCER SHEATH: Brand: PINNACLE

## (undated) DEVICE — GLV SURG BIOGEL LTX PF 7 1/2

## (undated) DEVICE — QUICK-CROSS™ SUPPORT CATHETER: Brand: QUICK-CROSS™

## (undated) DEVICE — Device

## (undated) DEVICE — ST MIC/INTRO ACC SHRP/NDL TUNG/TP NITNL 5F 45CM 7CM

## (undated) DEVICE — HI-TORQUE COMMAND ES GUIDE WIRE .014" 300 CM: Brand: HI-TORQUE COMMAND

## (undated) DEVICE — BALN NANOCROSS OTW .014 4F 3X210 150CM

## (undated) DEVICE — GUARDIAN LVC: Brand: GUARDIAN